# Patient Record
Sex: MALE | Race: WHITE | HISPANIC OR LATINO | URBAN - METROPOLITAN AREA
[De-identification: names, ages, dates, MRNs, and addresses within clinical notes are randomized per-mention and may not be internally consistent; named-entity substitution may affect disease eponyms.]

---

## 2017-02-21 ENCOUNTER — EMERGENCY (EMERGENCY)
Facility: HOSPITAL | Age: 37
LOS: 1 days | End: 2017-02-21
Payer: MEDICAID

## 2017-02-21 DIAGNOSIS — Z98.89 OTHER SPECIFIED POSTPROCEDURAL STATES: Chronic | ICD-10-CM

## 2017-02-21 DIAGNOSIS — I77.0 ARTERIOVENOUS FISTULA, ACQUIRED: Chronic | ICD-10-CM

## 2017-02-21 PROCEDURE — 99283 EMERGENCY DEPT VISIT LOW MDM: CPT

## 2017-03-20 ENCOUNTER — APPOINTMENT (OUTPATIENT)
Dept: CARDIOLOGY | Facility: CLINIC | Age: 37
End: 2017-03-20

## 2018-07-20 ENCOUNTER — EMERGENCY (EMERGENCY)
Facility: HOSPITAL | Age: 38
LOS: 1 days | End: 2018-07-20
Payer: MEDICAID

## 2018-07-20 DIAGNOSIS — Z98.89 OTHER SPECIFIED POSTPROCEDURAL STATES: Chronic | ICD-10-CM

## 2018-07-20 DIAGNOSIS — I77.0 ARTERIOVENOUS FISTULA, ACQUIRED: Chronic | ICD-10-CM

## 2018-07-20 PROCEDURE — 99284 EMERGENCY DEPT VISIT MOD MDM: CPT

## 2018-07-21 PROCEDURE — 70450 CT HEAD/BRAIN W/O DYE: CPT | Mod: 26

## 2018-08-28 ENCOUNTER — OUTPATIENT (OUTPATIENT)
Dept: OUTPATIENT SERVICES | Facility: HOSPITAL | Age: 38
LOS: 1 days | End: 2018-08-28

## 2018-08-28 DIAGNOSIS — I77.0 ARTERIOVENOUS FISTULA, ACQUIRED: Chronic | ICD-10-CM

## 2018-08-28 DIAGNOSIS — Z98.89 OTHER SPECIFIED POSTPROCEDURAL STATES: Chronic | ICD-10-CM

## 2018-09-04 ENCOUNTER — APPOINTMENT (OUTPATIENT)
Dept: TRANSPLANT | Facility: CLINIC | Age: 38
End: 2018-09-04

## 2018-09-04 ENCOUNTER — APPOINTMENT (OUTPATIENT)
Dept: NEPHROLOGY | Facility: CLINIC | Age: 38
End: 2018-09-04
Payer: COMMERCIAL

## 2018-09-04 VITALS
HEART RATE: 80 BPM | RESPIRATION RATE: 14 BRPM | SYSTOLIC BLOOD PRESSURE: 107 MMHG | HEIGHT: 64 IN | OXYGEN SATURATION: 96 % | TEMPERATURE: 98.3 F | DIASTOLIC BLOOD PRESSURE: 70 MMHG | WEIGHT: 128 LBS | BODY MASS INDEX: 21.85 KG/M2

## 2018-09-04 PROCEDURE — 99214 OFFICE O/P EST MOD 30 MIN: CPT

## 2018-09-12 LAB
ABO + RH PNL BLD: NORMAL
ALBUMIN SERPL ELPH-MCNC: 4.9 G/DL
ALP BLD-CCNC: 112 U/L
ALT SERPL-CCNC: 14 U/L
ANION GAP SERPL CALC-SCNC: 17 MMOL/L
AST SERPL-CCNC: 23 U/L
BASOPHILS # BLD AUTO: 0.05 K/UL
BASOPHILS NFR BLD AUTO: 0.9 %
BILIRUB SERPL-MCNC: 0.4 MG/DL
BUN SERPL-MCNC: 50 MG/DL
C PEPTIDE SERPL-MCNC: 4.5 NG/ML
CALCIUM SERPL-MCNC: 9.5 MG/DL
CHLORIDE SERPL-SCNC: 97 MMOL/L
CMV IGG SERPL QL: 1.8 U/ML
CMV IGG SERPL-IMP: POSITIVE
CO2 SERPL-SCNC: 28 MMOL/L
CREAT SERPL-MCNC: 10.02 MG/DL
EBV EA AB SER IA-ACNC: <5 U/ML
EBV EA AB TITR SER IF: POSITIVE
EBV EA IGG SER QL IA: >600 U/ML
EBV EA IGG SER-ACNC: NEGATIVE
EBV EA IGM SER IA-ACNC: NEGATIVE
EBV PATRN SPEC IB-IMP: NORMAL
EBV VCA IGG SER IA-ACNC: 67 U/ML
EBV VCA IGM SER QL IA: <10 U/ML
EOSINOPHIL # BLD AUTO: 0.52 K/UL
EOSINOPHIL NFR BLD AUTO: 9.2 %
EPSTEIN-BARR VIRUS CAPSID ANTIGEN IGG: POSITIVE
GLUCOSE SERPL-MCNC: 122 MG/DL
HBA1C MFR BLD HPLC: 6.5 %
HBV CORE IGG+IGM SER QL: NONREACTIVE
HBV SURFACE AB SER QL: NONREACTIVE
HBV SURFACE AG SER QL: NONREACTIVE
HCT VFR BLD CALC: 33.3 %
HCV AB SER QL: NONREACTIVE
HCV S/CO RATIO: 0.09 S/CO
HGB BLD-MCNC: 10.5 G/DL
HIV1+2 AB SPEC QL IA.RAPID: NONREACTIVE
HSV 1+2 IGG SER IA-IMP: NEGATIVE
HSV 1+2 IGG SER IA-IMP: POSITIVE
HSV1 IGG SER QL: 55.1 INDEX
HSV2 IGG SER QL: 0.13 INDEX
IMM GRANULOCYTES NFR BLD AUTO: 0.2 %
LYMPHOCYTES # BLD AUTO: 1.3 K/UL
LYMPHOCYTES NFR BLD AUTO: 23 %
MAGNESIUM SERPL-MCNC: 3.1 MG/DL
MAN DIFF?: NORMAL
MCHC RBC-ENTMCNC: 31.4 PG
MCHC RBC-ENTMCNC: 31.5 GM/DL
MCV RBC AUTO: 99.7 FL
MONOCYTES # BLD AUTO: 0.34 K/UL
MONOCYTES NFR BLD AUTO: 6 %
NEUTROPHILS # BLD AUTO: 3.42 K/UL
NEUTROPHILS NFR BLD AUTO: 60.7 %
PHOSPHATE SERPL-MCNC: 3.8 MG/DL
PLATELET # BLD AUTO: 206 K/UL
POTASSIUM SERPL-SCNC: 5.4 MMOL/L
PROT SERPL-MCNC: 8 G/DL
PSA SERPL-MCNC: 0.79 NG/ML
RBC # BLD: 3.34 M/UL
RBC # FLD: 13 %
RUBV IGG FLD-ACNC: >33 INDEX
RUBV IGG SER-IMP: POSITIVE
SODIUM SERPL-SCNC: 142 MMOL/L
T GONDII AB SER-IMP: POSITIVE
T GONDII IGG SER QL: 62.9 IU/ML
URATE SERPL-MCNC: 5.5 MG/DL
VZV AB TITR SER: POSITIVE
VZV IGG SER IF-ACNC: 2089 INDEX
WBC # FLD AUTO: 5.64 K/UL

## 2018-10-04 ENCOUNTER — APPOINTMENT (OUTPATIENT)
Dept: RADIOLOGY | Facility: IMAGING CENTER | Age: 38
End: 2018-10-04
Payer: COMMERCIAL

## 2018-10-04 ENCOUNTER — APPOINTMENT (OUTPATIENT)
Dept: CARDIOLOGY | Facility: CLINIC | Age: 38
End: 2018-10-04
Payer: COMMERCIAL

## 2018-10-04 ENCOUNTER — OUTPATIENT (OUTPATIENT)
Dept: OUTPATIENT SERVICES | Facility: HOSPITAL | Age: 38
LOS: 1 days | End: 2018-10-04
Payer: COMMERCIAL

## 2018-10-04 ENCOUNTER — NON-APPOINTMENT (OUTPATIENT)
Age: 38
End: 2018-10-04

## 2018-10-04 ENCOUNTER — APPOINTMENT (OUTPATIENT)
Dept: ULTRASOUND IMAGING | Facility: IMAGING CENTER | Age: 38
End: 2018-10-04
Payer: COMMERCIAL

## 2018-10-04 VITALS
OXYGEN SATURATION: 97 % | RESPIRATION RATE: 14 BRPM | HEIGHT: 64 IN | SYSTOLIC BLOOD PRESSURE: 123 MMHG | BODY MASS INDEX: 21.85 KG/M2 | DIASTOLIC BLOOD PRESSURE: 76 MMHG | WEIGHT: 128 LBS | HEART RATE: 86 BPM

## 2018-10-04 DIAGNOSIS — Z01.818 ENCOUNTER FOR OTHER PREPROCEDURAL EXAMINATION: ICD-10-CM

## 2018-10-04 DIAGNOSIS — I77.0 ARTERIOVENOUS FISTULA, ACQUIRED: Chronic | ICD-10-CM

## 2018-10-04 DIAGNOSIS — Z98.89 OTHER SPECIFIED POSTPROCEDURAL STATES: Chronic | ICD-10-CM

## 2018-10-04 PROCEDURE — 71045 X-RAY EXAM CHEST 1 VIEW: CPT | Mod: 26

## 2018-10-04 PROCEDURE — 93000 ELECTROCARDIOGRAM COMPLETE: CPT

## 2018-10-04 PROCEDURE — 76700 US EXAM ABDOM COMPLETE: CPT | Mod: 26

## 2018-10-04 PROCEDURE — 93979 VASCULAR STUDY: CPT

## 2018-10-04 PROCEDURE — 71045 X-RAY EXAM CHEST 1 VIEW: CPT

## 2018-10-04 PROCEDURE — 93979 VASCULAR STUDY: CPT | Mod: 26

## 2018-10-04 PROCEDURE — 99213 OFFICE O/P EST LOW 20 MIN: CPT

## 2018-10-04 PROCEDURE — 76700 US EXAM ABDOM COMPLETE: CPT

## 2018-10-18 ENCOUNTER — OUTPATIENT (OUTPATIENT)
Dept: OUTPATIENT SERVICES | Facility: HOSPITAL | Age: 38
LOS: 1 days | End: 2018-10-18

## 2018-10-18 ENCOUNTER — APPOINTMENT (OUTPATIENT)
Dept: CV DIAGNOSITCS | Facility: HOSPITAL | Age: 38
End: 2018-10-18
Payer: COMMERCIAL

## 2018-10-18 ENCOUNTER — APPOINTMENT (OUTPATIENT)
Dept: CV DIAGNOSTICS | Facility: HOSPITAL | Age: 38
End: 2018-10-18
Payer: COMMERCIAL

## 2018-10-18 DIAGNOSIS — N18.6 END STAGE RENAL DISEASE: ICD-10-CM

## 2018-10-18 DIAGNOSIS — I77.0 ARTERIOVENOUS FISTULA, ACQUIRED: Chronic | ICD-10-CM

## 2018-10-18 DIAGNOSIS — Z98.89 OTHER SPECIFIED POSTPROCEDURAL STATES: Chronic | ICD-10-CM

## 2018-10-18 DIAGNOSIS — E11.9 TYPE 2 DIABETES MELLITUS WITHOUT COMPLICATIONS: ICD-10-CM

## 2018-10-18 PROCEDURE — 93306 TTE W/DOPPLER COMPLETE: CPT | Mod: 26

## 2018-11-15 ENCOUNTER — OUTPATIENT (OUTPATIENT)
Dept: OUTPATIENT SERVICES | Facility: HOSPITAL | Age: 38
LOS: 1 days | End: 2018-11-15
Payer: COMMERCIAL

## 2018-11-15 ENCOUNTER — APPOINTMENT (OUTPATIENT)
Dept: CV DIAGNOSTICS | Facility: HOSPITAL | Age: 38
End: 2018-11-15

## 2018-11-15 DIAGNOSIS — E11.9 TYPE 2 DIABETES MELLITUS WITHOUT COMPLICATIONS: ICD-10-CM

## 2018-11-15 DIAGNOSIS — N18.6 END STAGE RENAL DISEASE: ICD-10-CM

## 2018-11-15 DIAGNOSIS — Z98.89 OTHER SPECIFIED POSTPROCEDURAL STATES: Chronic | ICD-10-CM

## 2018-11-15 DIAGNOSIS — I77.0 ARTERIOVENOUS FISTULA, ACQUIRED: Chronic | ICD-10-CM

## 2018-11-15 PROCEDURE — 93018 CV STRESS TEST I&R ONLY: CPT | Mod: GC

## 2018-11-15 PROCEDURE — 78452 HT MUSCLE IMAGE SPECT MULT: CPT | Mod: 26

## 2018-11-15 PROCEDURE — 93016 CV STRESS TEST SUPVJ ONLY: CPT | Mod: GC

## 2019-02-22 ENCOUNTER — EMERGENCY (EMERGENCY)
Facility: HOSPITAL | Age: 39
LOS: 1 days | End: 2019-02-22
Admitting: EMERGENCY MEDICINE
Payer: MEDICARE

## 2019-02-22 DIAGNOSIS — Z98.89 OTHER SPECIFIED POSTPROCEDURAL STATES: Chronic | ICD-10-CM

## 2019-02-22 DIAGNOSIS — I77.0 ARTERIOVENOUS FISTULA, ACQUIRED: Chronic | ICD-10-CM

## 2019-02-22 PROCEDURE — 71046 X-RAY EXAM CHEST 2 VIEWS: CPT | Mod: 26

## 2019-02-22 PROCEDURE — 99284 EMERGENCY DEPT VISIT MOD MDM: CPT

## 2019-02-22 PROCEDURE — 93010 ELECTROCARDIOGRAM REPORT: CPT

## 2019-08-12 ENCOUNTER — OUTPATIENT (OUTPATIENT)
Dept: OUTPATIENT SERVICES | Facility: HOSPITAL | Age: 39
LOS: 1 days | End: 2019-08-12

## 2019-08-12 DIAGNOSIS — Z98.89 OTHER SPECIFIED POSTPROCEDURAL STATES: Chronic | ICD-10-CM

## 2019-08-12 DIAGNOSIS — I77.0 ARTERIOVENOUS FISTULA, ACQUIRED: Chronic | ICD-10-CM

## 2019-09-10 ENCOUNTER — APPOINTMENT (OUTPATIENT)
Dept: TRANSPLANT | Facility: CLINIC | Age: 39
End: 2019-09-10

## 2019-09-10 ENCOUNTER — APPOINTMENT (OUTPATIENT)
Dept: NEPHROLOGY | Facility: CLINIC | Age: 39
End: 2019-09-10
Payer: COMMERCIAL

## 2019-09-10 VITALS
TEMPERATURE: 97.8 F | HEART RATE: 82 BPM | RESPIRATION RATE: 14 BRPM | SYSTOLIC BLOOD PRESSURE: 127 MMHG | OXYGEN SATURATION: 97 % | HEIGHT: 64 IN | WEIGHT: 128 LBS | DIASTOLIC BLOOD PRESSURE: 83 MMHG | BODY MASS INDEX: 21.85 KG/M2

## 2019-09-10 PROCEDURE — 99214 OFFICE O/P EST MOD 30 MIN: CPT

## 2019-09-10 RX ORDER — AMLODIPINE BESYLATE 5 MG/1
5 TABLET ORAL DAILY
Refills: 5 | Status: DISCONTINUED | COMMUNITY
End: 2019-09-10

## 2019-09-10 NOTE — PHYSICAL EXAM
[General Appearance - Alert] : alert [General Appearance - In No Acute Distress] : in no acute distress [Sclera] : the sclera and conjunctiva were normal [Jugular Venous Distention Increased] : there was no jugular-venous distention [Outer Ear] : the ears and nose were normal in appearance [Extraocular Movements] : extraocular movements were intact [Auscultation Breath Sounds / Voice Sounds] : lungs were clear to auscultation bilaterally [Heart Sounds Pericardial Friction Rub] : no pericardial rub [Arterial Pulses Carotid] : carotid pulses were normal with no bruits [Heart Sounds Gallop] : no gallops [Full Pulse] : the pedal pulses are present [Abdomen Tenderness] : non-tender [Abdomen Soft] : soft [Cervical Lymph Nodes Enlarged Anterior Bilaterally] : anterior cervical [Cervical Lymph Nodes Enlarged Posterior Bilaterally] : posterior cervical [Supraclavicular Lymph Nodes Enlarged Bilaterally] : supraclavicular [Axillary Lymph Nodes Enlarged Bilaterally] : axillary [Abnormal Walk] : normal gait [___ (cm) Fistula] : [unfilled] (cm) fistula [No Focal Deficits] : no focal deficits [] : no rash [Oriented To Time, Place, And Person] : oriented to person, place, and time [Impaired Insight] : insight and judgment were intact [Affect] : the affect was normal [Heart Rate And Rhythm] : heart rate was normal and rhythm regular [Heart Sounds] : normal S1 and S2 [Murmurs] : no murmurs [Edema] : there was no peripheral edema

## 2019-09-10 NOTE — HISTORY OF PRESENT ILLNESS
[FreeTextEntry1] : Mr. Mcghee is a 39 year old male with ESRD on dialysis since 2013, blood type O, who presents for f/u while on the kidney transplant waitlist.  He has a h/o hypertension (2013), DM (2000), now on insulin (2011). Has h/o diabetic retinopathy - vision is poor but does his own meds..  He is being followed by , previously by Dr. Hathaway.  He is on novolog 3 units with meals and 8 units of levemir.  \par Also has HTN and hyperlipidemia.  \par One hospitalization last year for influenza.\par No blood transfusions.  \par \par minimal urine output\par \par Past surgeries include: \par Left forearm AVF\par H/o cataract surgery. Had laser treatment for eyes.\par Non smoker, No ETOH use.\par \par He is independent for ADL. Can walk 10 blocks. Not working. Previously . \par \par

## 2019-09-10 NOTE — REASON FOR VISIT
[Follow-Up] : a follow-up visit [Family Member] : family member [FreeTextEntry1] : pre kidney transplant evaluation- follow up

## 2019-09-10 NOTE — ASSESSMENT
[FreeTextEntry1] : 1.  ESRD - Mr. Mcghee is an excellent candidate for kidney transplantation.  He could not identify any living donors but is interested in PHS inc risk and HCV positive kidney transplants. \par 2.  CV - no history of cardiac disease.  Will f/u with Dr. English. Normal stress and echo in 2018\par 3.  DM1 - discussed that blood sugar will rise after transplantation.\par 4.  HTN - stable. \par \par I have personally discussed the risks and benefits of transplantation where the following was disclosed:\par  \par Reviewed factors affecting survival and morbidity while on dialysis, the transplant wait list and reviewed james-operative and long-term risk factors affecting outcome in kidney transplantation.  \par  \par One year SRTR outcomes for national and [facility] were discussed in regards to patient survival and graft survival after transplantation.  \par  \par Details of transplant surgery, including complications were discussed.\par Immunosuppression and complications including infection including life threatening sepsis and opportunistic infections, malignancy and new onset diabetes were discussed.  \par  \par Benefits of live donor transplantation as well as variability in wait times across regions and multiple listing were discussed. \par KDPI >85% and PHS high risk criteria donors were discussed. \par HCV kidney transplantation was discussed.\par  \par

## 2019-09-10 NOTE — END OF VISIT
[>50% of Time Spent on Counseling for ____] : Greater than 50% of the encounter time was spent on counseling for [unfilled] [Time Spent: ___ minutes] : I have spent [unfilled] minutes of face to face time with the patient [FreeTextEntry1] : STACIE Del Rio

## 2019-09-11 LAB
ABO + RH PNL BLD: NORMAL
ALBUMIN SERPL ELPH-MCNC: 4.9 G/DL
ALP BLD-CCNC: 195 U/L
ALT SERPL-CCNC: 12 U/L
ANION GAP SERPL CALC-SCNC: 21 MMOL/L
AST SERPL-CCNC: 14 U/L
BASOPHILS # BLD AUTO: 0.06 K/UL
BASOPHILS NFR BLD AUTO: 1 %
BILIRUB SERPL-MCNC: 0.3 MG/DL
BUN SERPL-MCNC: 56 MG/DL
C PEPTIDE SERPL-MCNC: 7 NG/ML
CALCIUM SERPL-MCNC: 8.3 MG/DL
CHLORIDE SERPL-SCNC: 94 MMOL/L
CHOLEST SERPL-MCNC: 167 MG/DL
CHOLEST/HDLC SERPL: 1.9 RATIO
CK SERPL-CCNC: 294 U/L
CO2 SERPL-SCNC: 26 MMOL/L
CREAT SERPL-MCNC: 11.56 MG/DL
CRP SERPL-MCNC: <0.1 MG/DL
EOSINOPHIL # BLD AUTO: 0.33 K/UL
EOSINOPHIL NFR BLD AUTO: 5.3 %
ERYTHROCYTE [SEDIMENTATION RATE] IN BLOOD BY WESTERGREN METHOD: 10 MM/HR
ESTIMATED AVERAGE GLUCOSE: 143 MG/DL
GLUCOSE SERPL-MCNC: 112 MG/DL
HAV IGM SER QL: NONREACTIVE
HBA1C MFR BLD HPLC: 6.6 %
HBV CORE IGG+IGM SER QL: NONREACTIVE
HBV SURFACE AB SER QL: REACTIVE
HBV SURFACE AB SERPL IA-ACNC: 229.3 MIU/ML
HBV SURFACE AG SER QL: NONREACTIVE
HCT VFR BLD CALC: 42 %
HCV AB SER QL: NONREACTIVE
HCV S/CO RATIO: 0.1 S/CO
HDLC SERPL-MCNC: 88 MG/DL
HEPATITIS A IGG ANTIBODY: REACTIVE
HGB BLD-MCNC: 13.3 G/DL
IMM GRANULOCYTES NFR BLD AUTO: 0.3 %
LDLC SERPL CALC-MCNC: 63 MG/DL
LYMPHOCYTES # BLD AUTO: 1.42 K/UL
LYMPHOCYTES NFR BLD AUTO: 22.6 %
MAGNESIUM SERPL-MCNC: 2.9 MG/DL
MAN DIFF?: NORMAL
MCHC RBC-ENTMCNC: 30.9 PG
MCHC RBC-ENTMCNC: 31.7 GM/DL
MCV RBC AUTO: 97.7 FL
MONOCYTES # BLD AUTO: 0.51 K/UL
MONOCYTES NFR BLD AUTO: 8.1 %
NEUTROPHILS # BLD AUTO: 3.93 K/UL
NEUTROPHILS NFR BLD AUTO: 62.7 %
PHOSPHATE SERPL-MCNC: 6.1 MG/DL
PLATELET # BLD AUTO: 217 K/UL
POTASSIUM SERPL-SCNC: 5.7 MMOL/L
PROT SERPL-MCNC: 7.4 G/DL
PSA SERPL-MCNC: 0.83 NG/ML
RBC # BLD: 4.3 M/UL
RBC # FLD: 13.2 %
SODIUM SERPL-SCNC: 141 MMOL/L
T PALLIDUM AB SER QL IA: NEGATIVE
T3 SERPL-MCNC: 91 NG/DL
T4 FREE SERPL-MCNC: 1.2 NG/DL
TRIGL SERPL-MCNC: 79 MG/DL
TSH SERPL-ACNC: 1.03 UIU/ML
URATE SERPL-MCNC: 5.5 MG/DL
WBC # FLD AUTO: 6.27 K/UL

## 2019-09-12 LAB
CMV DNA SPEC QL NAA+PROBE: NOT DETECTED
CMV IGG SERPL QL: 1.6 U/ML
CMV IGG SERPL-IMP: POSITIVE
CMV IGM SERPL QL: <8 AU/ML
CMV IGM SERPL QL: NEGATIVE
CMVPCR LOG: NOT DETECTED LOGIU/ML
EBV EA AB SER IA-ACNC: <5 U/ML
EBV EA AB TITR SER IF: POSITIVE
EBV EA IGG SER QL IA: >600 U/ML
EBV EA IGG SER-ACNC: NEGATIVE
EBV EA IGM SER IA-ACNC: NEGATIVE
EBV PATRN SPEC IB-IMP: NORMAL
EBV VCA IGG SER IA-ACNC: 67.4 U/ML
EBV VCA IGM SER QL IA: <10 U/ML
EPSTEIN-BARR VIRUS CAPSID ANTIGEN IGG: POSITIVE
HSV 1+2 IGG SER IA-IMP: NEGATIVE
HSV 1+2 IGG SER IA-IMP: POSITIVE
HSV 1+2 IGG SER IA-IMP: POSITIVE
HSV1 IGG SER QL: >62.2 INDEX
HSV1 IGG SER QL: >62.2 INDEX
HSV1 IGM SER QL: NORMAL TITER
HSV2 AB FLD-ACNC: NORMAL TITER
HSV2 IGG SER QL: 0.17 INDEX
RUBV IGG FLD-ACNC: >33 INDEX
RUBV IGG SER-IMP: POSITIVE
T GONDII AB SER-IMP: POSITIVE
T GONDII IGG SER QL: 57 IU/ML
VZV AB TITR SER: POSITIVE
VZV IGG SER IF-ACNC: 1652 INDEX

## 2019-10-01 ENCOUNTER — OUTPATIENT (OUTPATIENT)
Dept: OUTPATIENT SERVICES | Facility: HOSPITAL | Age: 39
LOS: 1 days | End: 2019-10-01
Payer: MEDICARE

## 2019-10-01 ENCOUNTER — APPOINTMENT (OUTPATIENT)
Dept: CT IMAGING | Facility: CLINIC | Age: 39
End: 2019-10-01
Payer: COMMERCIAL

## 2019-10-01 ENCOUNTER — APPOINTMENT (OUTPATIENT)
Dept: RADIOLOGY | Facility: CLINIC | Age: 39
End: 2019-10-01
Payer: COMMERCIAL

## 2019-10-01 ENCOUNTER — OUTPATIENT (OUTPATIENT)
Dept: OUTPATIENT SERVICES | Facility: HOSPITAL | Age: 39
LOS: 1 days | End: 2019-10-01
Payer: COMMERCIAL

## 2019-10-01 DIAGNOSIS — Z98.89 OTHER SPECIFIED POSTPROCEDURAL STATES: Chronic | ICD-10-CM

## 2019-10-01 DIAGNOSIS — I77.0 ARTERIOVENOUS FISTULA, ACQUIRED: Chronic | ICD-10-CM

## 2019-10-01 DIAGNOSIS — Z76.82 AWAITING ORGAN TRANSPLANT STATUS: ICD-10-CM

## 2019-10-01 PROCEDURE — 74174 CTA ABD&PLVS W/CONTRAST: CPT | Mod: 26

## 2019-10-01 PROCEDURE — 71046 X-RAY EXAM CHEST 2 VIEWS: CPT | Mod: 26

## 2019-10-01 PROCEDURE — 71046 X-RAY EXAM CHEST 2 VIEWS: CPT

## 2019-10-01 PROCEDURE — 74174 CTA ABD&PLVS W/CONTRAST: CPT

## 2019-10-21 ENCOUNTER — TRANSCRIPTION ENCOUNTER (OUTPATIENT)
Age: 39
End: 2019-10-21

## 2019-10-22 ENCOUNTER — INPATIENT (INPATIENT)
Facility: HOSPITAL | Age: 39
LOS: 5 days | Discharge: HOME | DRG: 652 | End: 2019-10-28
Attending: TRANSPLANT SURGERY | Admitting: TRANSPLANT SURGERY
Payer: MEDICARE

## 2019-10-22 ENCOUNTER — APPOINTMENT (OUTPATIENT)
Dept: TRANSPLANT | Facility: HOSPITAL | Age: 39
End: 2019-10-22

## 2019-10-22 VITALS
SYSTOLIC BLOOD PRESSURE: 167 MMHG | RESPIRATION RATE: 18 BRPM | TEMPERATURE: 98 F | WEIGHT: 126.1 LBS | DIASTOLIC BLOOD PRESSURE: 98 MMHG | HEART RATE: 86 BPM | HEIGHT: 63 IN | OXYGEN SATURATION: 98 %

## 2019-10-22 DIAGNOSIS — I10 ESSENTIAL (PRIMARY) HYPERTENSION: ICD-10-CM

## 2019-10-22 DIAGNOSIS — I77.0 ARTERIOVENOUS FISTULA, ACQUIRED: Chronic | ICD-10-CM

## 2019-10-22 DIAGNOSIS — Z98.89 OTHER SPECIFIED POSTPROCEDURAL STATES: Chronic | ICD-10-CM

## 2019-10-22 DIAGNOSIS — T86.13 KIDNEY TRANSPLANT INFECTION: ICD-10-CM

## 2019-10-22 DIAGNOSIS — Z94.0 KIDNEY TRANSPLANT STATUS: ICD-10-CM

## 2019-10-22 DIAGNOSIS — D89.9 DISORDER INVOLVING THE IMMUNE MECHANISM, UNSPECIFIED: ICD-10-CM

## 2019-10-22 DIAGNOSIS — E11.9 TYPE 2 DIABETES MELLITUS WITHOUT COMPLICATIONS: ICD-10-CM

## 2019-10-22 LAB
ALBUMIN SERPL ELPH-MCNC: 3.4 G/DL — SIGNIFICANT CHANGE UP (ref 3.3–5)
ALBUMIN SERPL ELPH-MCNC: 3.6 G/DL — SIGNIFICANT CHANGE UP (ref 3.3–5)
ALBUMIN SERPL ELPH-MCNC: 4.8 G/DL — SIGNIFICANT CHANGE UP (ref 3.3–5)
ALP SERPL-CCNC: 129 U/L — HIGH (ref 40–120)
ALP SERPL-CCNC: 136 U/L — HIGH (ref 40–120)
ALP SERPL-CCNC: 208 U/L — HIGH (ref 40–120)
ALT FLD-CCNC: 10 U/L — SIGNIFICANT CHANGE UP (ref 10–45)
ALT FLD-CCNC: 8 U/L — LOW (ref 10–45)
ALT FLD-CCNC: 9 U/L — LOW (ref 10–45)
ANION GAP SERPL CALC-SCNC: 11 MMOL/L — SIGNIFICANT CHANGE UP (ref 5–17)
ANION GAP SERPL CALC-SCNC: 12 MMOL/L — SIGNIFICANT CHANGE UP (ref 5–17)
ANION GAP SERPL CALC-SCNC: 17 MMOL/L — SIGNIFICANT CHANGE UP (ref 5–17)
APTT BLD: 30.6 SEC — SIGNIFICANT CHANGE UP (ref 27.5–36.3)
AST SERPL-CCNC: 15 U/L — SIGNIFICANT CHANGE UP (ref 10–40)
AST SERPL-CCNC: 19 U/L — SIGNIFICANT CHANGE UP (ref 10–40)
AST SERPL-CCNC: 27 U/L — SIGNIFICANT CHANGE UP (ref 10–40)
BASE EXCESS BLDA CALC-SCNC: -4.7 MMOL/L — LOW (ref -2–2)
BASE EXCESS BLDV CALC-SCNC: 5 MMOL/L — HIGH (ref -2–2)
BASOPHILS # BLD AUTO: 0.07 K/UL — SIGNIFICANT CHANGE UP (ref 0–0.2)
BASOPHILS NFR BLD AUTO: 0.9 % — SIGNIFICANT CHANGE UP (ref 0–2)
BILIRUB SERPL-MCNC: 0.2 MG/DL — SIGNIFICANT CHANGE UP (ref 0.2–1.2)
BILIRUB SERPL-MCNC: 0.3 MG/DL — SIGNIFICANT CHANGE UP (ref 0.2–1.2)
BILIRUB SERPL-MCNC: 0.4 MG/DL — SIGNIFICANT CHANGE UP (ref 0.2–1.2)
BLD GP AB SCN SERPL QL: NEGATIVE — SIGNIFICANT CHANGE UP
BUN SERPL-MCNC: 43 MG/DL — HIGH (ref 7–23)
BUN SERPL-MCNC: 46 MG/DL — HIGH (ref 7–23)
BUN SERPL-MCNC: 47 MG/DL — HIGH (ref 7–23)
CA-I SERPL-SCNC: 1.03 MMOL/L — LOW (ref 1.12–1.3)
CALCIUM SERPL-MCNC: 7.2 MG/DL — LOW (ref 8.4–10.5)
CALCIUM SERPL-MCNC: 7.8 MG/DL — LOW (ref 8.4–10.5)
CALCIUM SERPL-MCNC: 8.5 MG/DL — SIGNIFICANT CHANGE UP (ref 8.4–10.5)
CHLORIDE BLDV-SCNC: 98 MMOL/L — SIGNIFICANT CHANGE UP (ref 96–108)
CHLORIDE SERPL-SCNC: 107 MMOL/L — SIGNIFICANT CHANGE UP (ref 96–108)
CHLORIDE SERPL-SCNC: 107 MMOL/L — SIGNIFICANT CHANGE UP (ref 96–108)
CHLORIDE SERPL-SCNC: 92 MMOL/L — LOW (ref 96–108)
CO2 BLDA-SCNC: 22 MMOL/L — SIGNIFICANT CHANGE UP (ref 22–30)
CO2 BLDV-SCNC: 33 MMOL/L — HIGH (ref 22–30)
CO2 SERPL-SCNC: 18 MMOL/L — LOW (ref 22–31)
CO2 SERPL-SCNC: 20 MMOL/L — LOW (ref 22–31)
CO2 SERPL-SCNC: 29 MMOL/L — SIGNIFICANT CHANGE UP (ref 22–31)
CREAT SERPL-MCNC: 8.94 MG/DL — HIGH (ref 0.5–1.3)
CREAT SERPL-MCNC: 9.46 MG/DL — HIGH (ref 0.5–1.3)
CREAT SERPL-MCNC: 9.78 MG/DL — HIGH (ref 0.5–1.3)
EOSINOPHIL # BLD AUTO: 0.43 K/UL — SIGNIFICANT CHANGE UP (ref 0–0.5)
EOSINOPHIL NFR BLD AUTO: 5.4 % — SIGNIFICANT CHANGE UP (ref 0–6)
GAS PNL BLDA: SIGNIFICANT CHANGE UP
GAS PNL BLDV: 137 MMOL/L — SIGNIFICANT CHANGE UP (ref 135–145)
GAS PNL BLDV: SIGNIFICANT CHANGE UP
GAS PNL BLDV: SIGNIFICANT CHANGE UP
GLUCOSE BLDC GLUCOMTR-MCNC: 135 MG/DL — HIGH (ref 70–99)
GLUCOSE BLDC GLUCOMTR-MCNC: 143 MG/DL — HIGH (ref 70–99)
GLUCOSE BLDC GLUCOMTR-MCNC: 151 MG/DL — HIGH (ref 70–99)
GLUCOSE BLDC GLUCOMTR-MCNC: 154 MG/DL — HIGH (ref 70–99)
GLUCOSE BLDV-MCNC: 167 MG/DL — HIGH (ref 70–99)
GLUCOSE SERPL-MCNC: 138 MG/DL — HIGH (ref 70–99)
GLUCOSE SERPL-MCNC: 157 MG/DL — HIGH (ref 70–99)
GLUCOSE SERPL-MCNC: 168 MG/DL — HIGH (ref 70–99)
HAV IGM SER-ACNC: SIGNIFICANT CHANGE UP
HBV CORE IGM SER-ACNC: SIGNIFICANT CHANGE UP
HBV SURFACE AG SER-ACNC: SIGNIFICANT CHANGE UP
HCO3 BLDA-SCNC: 21 MMOL/L — SIGNIFICANT CHANGE UP (ref 21–29)
HCO3 BLDV-SCNC: 31 MMOL/L — HIGH (ref 21–29)
HCT VFR BLD CALC: 31.6 % — LOW (ref 39–50)
HCT VFR BLD CALC: 31.6 % — LOW (ref 39–50)
HCT VFR BLD CALC: 39.7 % — SIGNIFICANT CHANGE UP (ref 39–50)
HCT VFR BLDA CALC: 42 % — SIGNIFICANT CHANGE UP (ref 39–50)
HCV AB S/CO SERPL IA: 0.1 S/CO — SIGNIFICANT CHANGE UP (ref 0–0.99)
HCV AB SERPL-IMP: SIGNIFICANT CHANGE UP
HGB BLD CALC-MCNC: 13.6 G/DL — SIGNIFICANT CHANGE UP (ref 13–17)
HGB BLD-MCNC: 13.1 G/DL — SIGNIFICANT CHANGE UP (ref 13–17)
HGB BLD-MCNC: 9.9 G/DL — LOW (ref 13–17)
HGB BLD-MCNC: 9.9 G/DL — LOW (ref 13–17)
HOROWITZ INDEX BLDA+IHG-RTO: 21 — SIGNIFICANT CHANGE UP
HOROWITZ INDEX BLDV+IHG-RTO: 21 — SIGNIFICANT CHANGE UP
IMM GRANULOCYTES NFR BLD AUTO: 0.1 % — SIGNIFICANT CHANGE UP (ref 0–1.5)
INR BLD: 0.85 RATIO — LOW (ref 0.88–1.16)
LACTATE BLDV-MCNC: 1.2 MMOL/L — SIGNIFICANT CHANGE UP (ref 0.7–2)
LYMPHOCYTES # BLD AUTO: 1.51 K/UL — SIGNIFICANT CHANGE UP (ref 1–3.3)
LYMPHOCYTES # BLD AUTO: 19.1 % — SIGNIFICANT CHANGE UP (ref 13–44)
MAGNESIUM SERPL-MCNC: 2.2 MG/DL — SIGNIFICANT CHANGE UP (ref 1.6–2.6)
MAGNESIUM SERPL-MCNC: 2.9 MG/DL — HIGH (ref 1.6–2.6)
MCHC RBC-ENTMCNC: 29.7 PG — SIGNIFICANT CHANGE UP (ref 27–34)
MCHC RBC-ENTMCNC: 30 PG — SIGNIFICANT CHANGE UP (ref 27–34)
MCHC RBC-ENTMCNC: 30.4 PG — SIGNIFICANT CHANGE UP (ref 27–34)
MCHC RBC-ENTMCNC: 31.3 GM/DL — LOW (ref 32–36)
MCHC RBC-ENTMCNC: 31.3 GM/DL — LOW (ref 32–36)
MCHC RBC-ENTMCNC: 33 GM/DL — SIGNIFICANT CHANGE UP (ref 32–36)
MCV RBC AUTO: 92.1 FL — SIGNIFICANT CHANGE UP (ref 80–100)
MCV RBC AUTO: 94.9 FL — SIGNIFICANT CHANGE UP (ref 80–100)
MCV RBC AUTO: 95.8 FL — SIGNIFICANT CHANGE UP (ref 80–100)
MONOCYTES # BLD AUTO: 0.73 K/UL — SIGNIFICANT CHANGE UP (ref 0–0.9)
MONOCYTES NFR BLD AUTO: 9.2 % — SIGNIFICANT CHANGE UP (ref 2–14)
NEUTROPHILS # BLD AUTO: 5.16 K/UL — SIGNIFICANT CHANGE UP (ref 1.8–7.4)
NEUTROPHILS NFR BLD AUTO: 65.3 % — SIGNIFICANT CHANGE UP (ref 43–77)
NRBC # BLD: 0 /100 WBCS — SIGNIFICANT CHANGE UP (ref 0–0)
OTHER CELLS CSF MANUAL: 14 ML/DL — LOW (ref 18–22)
PCO2 BLDA: 42 MMHG — SIGNIFICANT CHANGE UP (ref 32–46)
PCO2 BLDV: 54 MMHG — HIGH (ref 35–50)
PH BLDA: 7.31 — LOW (ref 7.35–7.45)
PH BLDV: 7.38 — SIGNIFICANT CHANGE UP (ref 7.35–7.45)
PHOSPHATE SERPL-MCNC: 2.5 MG/DL — SIGNIFICANT CHANGE UP (ref 2.5–4.5)
PHOSPHATE SERPL-MCNC: 3.2 MG/DL — SIGNIFICANT CHANGE UP (ref 2.5–4.5)
PLATELET # BLD AUTO: 173 K/UL — SIGNIFICANT CHANGE UP (ref 150–400)
PLATELET # BLD AUTO: 178 K/UL — SIGNIFICANT CHANGE UP (ref 150–400)
PLATELET # BLD AUTO: 207 K/UL — SIGNIFICANT CHANGE UP (ref 150–400)
PO2 BLDA: 118 MMHG — HIGH (ref 74–108)
PO2 BLDV: 42 MMHG — SIGNIFICANT CHANGE UP (ref 25–45)
POTASSIUM BLDV-SCNC: 4.8 MMOL/L — SIGNIFICANT CHANGE UP (ref 3.5–5.3)
POTASSIUM SERPL-MCNC: 4.9 MMOL/L — SIGNIFICANT CHANGE UP (ref 3.5–5.3)
POTASSIUM SERPL-MCNC: 5.3 MMOL/L — SIGNIFICANT CHANGE UP (ref 3.5–5.3)
POTASSIUM SERPL-MCNC: 7.3 MMOL/L — CRITICAL HIGH (ref 3.5–5.3)
POTASSIUM SERPL-SCNC: 4.9 MMOL/L — SIGNIFICANT CHANGE UP (ref 3.5–5.3)
POTASSIUM SERPL-SCNC: 5.3 MMOL/L — SIGNIFICANT CHANGE UP (ref 3.5–5.3)
POTASSIUM SERPL-SCNC: 7.3 MMOL/L — CRITICAL HIGH (ref 3.5–5.3)
PROT SERPL-MCNC: 5.6 G/DL — LOW (ref 6–8.3)
PROT SERPL-MCNC: 5.8 G/DL — LOW (ref 6–8.3)
PROT SERPL-MCNC: 7.9 G/DL — SIGNIFICANT CHANGE UP (ref 6–8.3)
PROTHROM AB SERPL-ACNC: 9.6 SEC — LOW (ref 10–12.9)
RBC # BLD: 3.3 M/UL — LOW (ref 4.2–5.8)
RBC # BLD: 3.33 M/UL — LOW (ref 4.2–5.8)
RBC # BLD: 4.31 M/UL — SIGNIFICANT CHANGE UP (ref 4.2–5.8)
RBC # FLD: 12.9 % — SIGNIFICANT CHANGE UP (ref 10.3–14.5)
RBC # FLD: 13.2 % — SIGNIFICANT CHANGE UP (ref 10.3–14.5)
RBC # FLD: 13.2 % — SIGNIFICANT CHANGE UP (ref 10.3–14.5)
RH IG SCN BLD-IMP: POSITIVE — SIGNIFICANT CHANGE UP
SAO2 % BLDA: 97 % — HIGH (ref 92–96)
SAO2 % BLDV: 75 % — SIGNIFICANT CHANGE UP (ref 67–88)
SODIUM SERPL-SCNC: 137 MMOL/L — SIGNIFICANT CHANGE UP (ref 135–145)
SODIUM SERPL-SCNC: 138 MMOL/L — SIGNIFICANT CHANGE UP (ref 135–145)
SODIUM SERPL-SCNC: 138 MMOL/L — SIGNIFICANT CHANGE UP (ref 135–145)
WBC # BLD: 14.1 K/UL — HIGH (ref 3.8–10.5)
WBC # BLD: 16.41 K/UL — HIGH (ref 3.8–10.5)
WBC # BLD: 7.91 K/UL — SIGNIFICANT CHANGE UP (ref 3.8–10.5)
WBC # FLD AUTO: 14.1 K/UL — HIGH (ref 3.8–10.5)
WBC # FLD AUTO: 16.41 K/UL — HIGH (ref 3.8–10.5)
WBC # FLD AUTO: 7.91 K/UL — SIGNIFICANT CHANGE UP (ref 3.8–10.5)

## 2019-10-22 PROCEDURE — 76998 US GUIDE INTRAOP: CPT | Mod: 26,GC

## 2019-10-22 PROCEDURE — 71045 X-RAY EXAM CHEST 1 VIEW: CPT | Mod: 26,76

## 2019-10-22 PROCEDURE — 93010 ELECTROCARDIOGRAM REPORT: CPT

## 2019-10-22 PROCEDURE — 99222 1ST HOSP IP/OBS MODERATE 55: CPT | Mod: GC

## 2019-10-22 PROCEDURE — 50360 RNL ALTRNSPLJ W/O RCP NFRCT: CPT | Mod: GC

## 2019-10-22 PROCEDURE — 76776 US EXAM K TRANSPL W/DOPPLER: CPT | Mod: 26,RT

## 2019-10-22 PROCEDURE — 50605 INSERT URETERAL SUPPORT: CPT | Mod: GC

## 2019-10-22 PROCEDURE — 93010 ELECTROCARDIOGRAM REPORT: CPT | Mod: 77

## 2019-10-22 PROCEDURE — 50325 PREP DONOR RENAL GRAFT: CPT | Mod: GC

## 2019-10-22 RX ORDER — TACROLIMUS 5 MG/1
4 CAPSULE ORAL DAILY
Refills: 0 | Status: DISCONTINUED | OUTPATIENT
Start: 2019-10-23 | End: 2019-10-24

## 2019-10-22 RX ORDER — CEFAZOLIN SODIUM 1 G
2000 VIAL (EA) INJECTION ONCE
Refills: 0 | Status: DISCONTINUED | OUTPATIENT
Start: 2019-10-22 | End: 2019-10-22

## 2019-10-22 RX ORDER — HYDROMORPHONE HYDROCHLORIDE 2 MG/ML
0.5 INJECTION INTRAMUSCULAR; INTRAVENOUS; SUBCUTANEOUS
Refills: 0 | Status: DISCONTINUED | OUTPATIENT
Start: 2019-10-22 | End: 2019-10-23

## 2019-10-22 RX ORDER — SODIUM CHLORIDE 9 MG/ML
1000 INJECTION INTRAMUSCULAR; INTRAVENOUS; SUBCUTANEOUS
Refills: 0 | Status: DISCONTINUED | OUTPATIENT
Start: 2019-10-22 | End: 2019-10-24

## 2019-10-22 RX ORDER — ASPIRIN/CALCIUM CARB/MAGNESIUM 324 MG
81 TABLET ORAL DAILY
Refills: 0 | Status: DISCONTINUED | OUTPATIENT
Start: 2019-10-23 | End: 2019-10-28

## 2019-10-22 RX ORDER — NALOXONE HYDROCHLORIDE 4 MG/.1ML
0.1 SPRAY NASAL
Refills: 0 | Status: DISCONTINUED | OUTPATIENT
Start: 2019-10-22 | End: 2019-10-28

## 2019-10-22 RX ORDER — HYDROMORPHONE HYDROCHLORIDE 2 MG/ML
0.5 INJECTION INTRAMUSCULAR; INTRAVENOUS; SUBCUTANEOUS
Refills: 0 | Status: DISCONTINUED | OUTPATIENT
Start: 2019-10-22 | End: 2019-10-22

## 2019-10-22 RX ORDER — BASILIXIMAB 20 MG/5ML
20 INJECTION, POWDER, FOR SOLUTION INTRAVENOUS ONCE
Refills: 0 | Status: DISCONTINUED | OUTPATIENT
Start: 2019-10-22 | End: 2019-10-22

## 2019-10-22 RX ORDER — VALGANCICLOVIR 450 MG/1
450 TABLET, FILM COATED ORAL DAILY
Refills: 0 | Status: DISCONTINUED | OUTPATIENT
Start: 2019-10-23 | End: 2019-10-28

## 2019-10-22 RX ORDER — SODIUM CHLORIDE 9 MG/ML
1000 INJECTION, SOLUTION INTRAVENOUS
Refills: 0 | Status: DISCONTINUED | OUTPATIENT
Start: 2019-10-22 | End: 2019-10-28

## 2019-10-22 RX ORDER — DEXTROSE 50 % IN WATER 50 %
50 SYRINGE (ML) INTRAVENOUS ONCE
Refills: 0 | Status: COMPLETED | OUTPATIENT
Start: 2019-10-22 | End: 2019-10-22

## 2019-10-22 RX ORDER — MYCOPHENOLATE MOFETIL 250 MG/1
1000 CAPSULE ORAL
Refills: 0 | Status: DISCONTINUED | OUTPATIENT
Start: 2019-10-23 | End: 2019-10-28

## 2019-10-22 RX ORDER — DEXTROSE 50 % IN WATER 50 %
25 SYRINGE (ML) INTRAVENOUS ONCE
Refills: 0 | Status: DISCONTINUED | OUTPATIENT
Start: 2019-10-22 | End: 2019-10-28

## 2019-10-22 RX ORDER — DEXTROSE 50 % IN WATER 50 %
25 SYRINGE (ML) INTRAVENOUS ONCE
Refills: 0 | Status: DISCONTINUED | OUTPATIENT
Start: 2019-10-22 | End: 2019-10-22

## 2019-10-22 RX ORDER — DEXTROSE 50 % IN WATER 50 %
15 SYRINGE (ML) INTRAVENOUS ONCE
Refills: 0 | Status: DISCONTINUED | OUTPATIENT
Start: 2019-10-22 | End: 2019-10-28

## 2019-10-22 RX ORDER — SODIUM CHLORIDE 9 MG/ML
1000 INJECTION, SOLUTION INTRAVENOUS
Refills: 0 | Status: DISCONTINUED | OUTPATIENT
Start: 2019-10-22 | End: 2019-10-22

## 2019-10-22 RX ORDER — MYCOPHENOLATE MOFETIL 250 MG/1
1000 CAPSULE ORAL ONCE
Refills: 0 | Status: COMPLETED | OUTPATIENT
Start: 2019-10-22 | End: 2019-10-22

## 2019-10-22 RX ORDER — ONDANSETRON 8 MG/1
4 TABLET, FILM COATED ORAL EVERY 6 HOURS
Refills: 0 | Status: DISCONTINUED | OUTPATIENT
Start: 2019-10-22 | End: 2019-10-28

## 2019-10-22 RX ORDER — CALCIUM GLUCONATE 100 MG/ML
2 VIAL (ML) INTRAVENOUS ONCE
Refills: 0 | Status: COMPLETED | OUTPATIENT
Start: 2019-10-22 | End: 2019-10-22

## 2019-10-22 RX ORDER — ONDANSETRON 8 MG/1
4 TABLET, FILM COATED ORAL ONCE
Refills: 0 | Status: COMPLETED | OUTPATIENT
Start: 2019-10-22 | End: 2019-10-22

## 2019-10-22 RX ORDER — SODIUM CHLORIDE 9 MG/ML
500 INJECTION, SOLUTION INTRAVENOUS
Refills: 0 | Status: DISCONTINUED | OUTPATIENT
Start: 2019-10-22 | End: 2019-10-24

## 2019-10-22 RX ORDER — ASPIRIN/CALCIUM CARB/MAGNESIUM 324 MG
81 TABLET ORAL ONCE
Refills: 0 | Status: COMPLETED | OUTPATIENT
Start: 2019-10-22 | End: 2019-10-22

## 2019-10-22 RX ORDER — GLUCAGON INJECTION, SOLUTION 0.5 MG/.1ML
1 INJECTION, SOLUTION SUBCUTANEOUS ONCE
Refills: 0 | Status: DISCONTINUED | OUTPATIENT
Start: 2019-10-22 | End: 2019-10-28

## 2019-10-22 RX ORDER — DEXTROSE 50 % IN WATER 50 %
15 SYRINGE (ML) INTRAVENOUS ONCE
Refills: 0 | Status: DISCONTINUED | OUTPATIENT
Start: 2019-10-22 | End: 2019-10-22

## 2019-10-22 RX ORDER — DEXTROSE 50 % IN WATER 50 %
12.5 SYRINGE (ML) INTRAVENOUS ONCE
Refills: 0 | Status: DISCONTINUED | OUTPATIENT
Start: 2019-10-22 | End: 2019-10-28

## 2019-10-22 RX ORDER — HYDROMORPHONE HYDROCHLORIDE 2 MG/ML
30 INJECTION INTRAMUSCULAR; INTRAVENOUS; SUBCUTANEOUS
Refills: 0 | Status: DISCONTINUED | OUTPATIENT
Start: 2019-10-22 | End: 2019-10-23

## 2019-10-22 RX ORDER — INSULIN HUMAN 100 [IU]/ML
10 INJECTION, SOLUTION SUBCUTANEOUS ONCE
Refills: 0 | Status: DISCONTINUED | OUTPATIENT
Start: 2019-10-22 | End: 2019-10-22

## 2019-10-22 RX ORDER — INSULIN LISPRO 100/ML
VIAL (ML) SUBCUTANEOUS EVERY 4 HOURS
Refills: 0 | Status: DISCONTINUED | OUTPATIENT
Start: 2019-10-22 | End: 2019-10-22

## 2019-10-22 RX ORDER — DEXTROSE 50 % IN WATER 50 %
12.5 SYRINGE (ML) INTRAVENOUS ONCE
Refills: 0 | Status: DISCONTINUED | OUTPATIENT
Start: 2019-10-22 | End: 2019-10-22

## 2019-10-22 RX ORDER — CHLORHEXIDINE GLUCONATE 213 G/1000ML
1 SOLUTION TOPICAL
Refills: 0 | Status: DISCONTINUED | OUTPATIENT
Start: 2019-10-22 | End: 2019-10-28

## 2019-10-22 RX ORDER — GLUCAGON INJECTION, SOLUTION 0.5 MG/.1ML
1 INJECTION, SOLUTION SUBCUTANEOUS ONCE
Refills: 0 | Status: DISCONTINUED | OUTPATIENT
Start: 2019-10-22 | End: 2019-10-22

## 2019-10-22 RX ORDER — INSULIN LISPRO 100/ML
VIAL (ML) SUBCUTANEOUS EVERY 6 HOURS
Refills: 0 | Status: DISCONTINUED | OUTPATIENT
Start: 2019-10-22 | End: 2019-10-23

## 2019-10-22 RX ORDER — FAMOTIDINE 10 MG/ML
20 INJECTION INTRAVENOUS DAILY
Refills: 0 | Status: DISCONTINUED | OUTPATIENT
Start: 2019-10-23 | End: 2019-10-28

## 2019-10-22 RX ORDER — NYSTATIN 500MM UNIT
500000 POWDER (EA) MISCELLANEOUS
Refills: 0 | Status: DISCONTINUED | OUTPATIENT
Start: 2019-10-23 | End: 2019-10-28

## 2019-10-22 RX ORDER — INSULIN HUMAN 100 [IU]/ML
10 INJECTION, SOLUTION SUBCUTANEOUS ONCE
Refills: 0 | Status: COMPLETED | OUTPATIENT
Start: 2019-10-22 | End: 2019-10-22

## 2019-10-22 RX ADMIN — HYDROMORPHONE HYDROCHLORIDE 30 MILLILITER(S): 2 INJECTION INTRAMUSCULAR; INTRAVENOUS; SUBCUTANEOUS at 18:42

## 2019-10-22 RX ADMIN — Medication 81 MILLIGRAM(S): at 15:48

## 2019-10-22 RX ADMIN — INSULIN HUMAN 10 UNIT(S): 100 INJECTION, SOLUTION SUBCUTANEOUS at 20:31

## 2019-10-22 RX ADMIN — ONDANSETRON 4 MILLIGRAM(S): 8 TABLET, FILM COATED ORAL at 22:14

## 2019-10-22 RX ADMIN — ONDANSETRON 4 MILLIGRAM(S): 8 TABLET, FILM COATED ORAL at 18:26

## 2019-10-22 RX ADMIN — Medication 50 MILLILITER(S): at 20:31

## 2019-10-22 RX ADMIN — Medication 200 GRAM(S): at 20:59

## 2019-10-22 RX ADMIN — SODIUM CHLORIDE 70 MILLILITER(S): 9 INJECTION INTRAMUSCULAR; INTRAVENOUS; SUBCUTANEOUS at 16:01

## 2019-10-22 RX ADMIN — HYDROMORPHONE HYDROCHLORIDE 30 MILLILITER(S): 2 INJECTION INTRAMUSCULAR; INTRAVENOUS; SUBCUTANEOUS at 21:39

## 2019-10-22 RX ADMIN — Medication 1: at 18:20

## 2019-10-22 RX ADMIN — MYCOPHENOLATE MOFETIL 1000 MILLIGRAM(S): 250 CAPSULE ORAL at 20:49

## 2019-10-22 NOTE — H&P ADULT - NSHPREVIEWOFSYSTEMS_GEN_ALL_CORE
Review of systems  Gen: No weight changes, fatigue, fevers/chills, weakness  Skin: No rashes  Head/Eyes/Ears/Mouth: Reports Impaired vision, No headache; Normal hearing;  No sinus pain/discomfort, sore throat  Respiratory: No dyspnea, cough, wheezing, hemoptysis  CV: No chest pain, PND, orthopnea  GI: No abdominal pain, diarrhea, constipation, nausea, vomiting, melena, hematochezia  : Reports minimal urine output  MSK: No joint pain/swelling; no back pain; no edema  Neuro: BL LE numbness and tingling at baseline, No dizziness/lightheadedness, weakness, seizures,  Heme: No easy bruising or bleeding  Endo: No heat/cold intolerance  Psych: No significant nervousness, anxiety, stress, depression  All other systems were reviewed and are negative, except as noted.

## 2019-10-22 NOTE — CONSULT NOTE ADULT - PROBLEM SELECTOR RECOMMENDATION 3
home medication norvasc 5mg daily and hydralazine 100 mg BID    - monitor BP - hole home med norvasc 5mg daily and hydralazine 100 BID, restart when BP can tolerate starting w/ norvasc  - monitor BP - hold home med norvasc 5mg daily and hydralazine 100 BID, restart when BP can tolerate starting w/ norvasc  - monitor BP

## 2019-10-22 NOTE — H&P ADULT - NSHPLABSRESULTS_GEN_ALL_CORE
Vital Signs Last 24 Hrs  T(C): 36.7 (22 Oct 2019 05:58), Max: 36.7 (22 Oct 2019 05:58)  T(F): 98.1 (22 Oct 2019 05:58), Max: 98.1 (22 Oct 2019 05:58)  HR: 86 (22 Oct 2019 05:58) (86 - 86)  BP: 167/98 (22 Oct 2019 05:58) (167/98 - 167/98)  BP(mean): --  RR: 18 (22 Oct 2019 05:58) (18 - 18)  SpO2: 98% (22 Oct 2019 05:58) (98% - 98%)    I&O's Summary                            13.1   7.91  )-----------( 207      ( 22 Oct 2019 05:47 )             39.7     10-22    138  |  92<L>  |  47<H>  ----------------------------<  168<H>  4.9   |  29  |  9.46<H>    Ca    8.5      22 Oct 2019 05:48  Phos  3.2     10-22  Mg     2.9     10-22    TPro  7.9  /  Alb  4.8  /  TBili  0.4  /  DBili  0.1  /  AST  19  /  ALT  10  /  AlkPhos  208<H>  10-22          -EKG: 10/4/2018 NSR     -Stress: 11/15/2018- NSR with no significant ST abnormalities     -ECHO: 10/18/2018- EF 66%  Mild MR. Normal LV internal dimensions and wall thicknesses Normal LV systolic function. No segmental wall motion abnormalities. Normal RV size and function.     -CATH: 6/21/16 Proximal LAD minur luminar irregularities with no flow limiting lesions, RCA large sized minor luminal irregularieties with no flow limiting lesions.     -CT Abdomen – 10/1/2019 - The abdominal aorta is nonaneurysmal with mild atherosclerotic changes. The celiac axis, superior mesenteric artery, inferior mesenteric artery and renal arteries are patent without stenosis. The iliac arteries show no evidence of stenosis the internal and external iliacs are patent without focal abnormality. The deep venous system including IVC and iliac veins appear to be within normal limits No evidence of vascular stenosis or venous thrombosis. 5 mm right lower lobe nodule new when compared to prior abdominal pelvic

## 2019-10-22 NOTE — CONSULT NOTE ADULT - PROBLEM SELECTOR RECOMMENDATION 9
s/p HCV+ DDRT, simulect induction POD# 0 and 4    - immunosuppression: Cellcept 1000 BID, Methyprednisolone taper (>125>60>30),   - will start envarsus 0.14 mg/kg/day starting tomorrow   - start ppx: bactrim/valcyte/nystatin/pepcid  - check daily Tacrolimus trough 30 minutes before morning dose (goal 8-10)  - trend SCr, strict I/O  - renally dose medications s/p HCV+ DDRT, simulect induction POD# 0 and 4    - will reassess need for dialysis including K and volume status (wife consented for dialysis)  - immunosuppression: Cellcept 1000 BID, Methyprednisolone taper (>125>60>30),   - will start envarsus 0.14 mg/kg/day starting tomorrow   - start ppx: bactrim/valcyte/nystatin/pepcid  - check daily Tacrolimus trough 30 minutes before morning dose (goal 8-10)  - trend SCr, strict I/O  - renally dose medications

## 2019-10-22 NOTE — PROGRESS NOTE ADULT - SUBJECTIVE AND OBJECTIVE BOX
Transplant Surgery - Multidisciplinary Rounds  --------------------------------------------------------------  DDRT     Date:  10/22/19       POD#0    Present:   Patient seen with Dr. Greenberg/[facility]    HPI:    39M with PMHx of IDDM () on insulin since , ESRD on HD M/W/F since  (last HD Monday 10/22) Via Left forearm AVF, HTN (), HLD, Retinopathy,  Cataract surgery and laser eye surgery s/p HCV+ DDRT with Simulect induction on 10/22. ureter stented.        Recipient:  Jerardo Mcghee   /Age:  1980 age 39   Hx: ESRD on HD since , HTN, T2DM, Retinopathy, Anemia,   Nephrologist: ,   CPRA: 0%   ABO: O+   CMV/EBV Status: CMV +, EBV +   Last HD: Monday 10/21/19   NPO   since 10/21/2019 @ 2130   Pt Location on route [facility]   Financially cleared - yes   Recipient OR   - 10/22/2019 at 0700   Surgeon: Dr. Michele Greenberg     Donor (import?): yes -   Donor ID: [id number]   Match:  [id number] then local match [id number]   OPO: [facility] [place] [place]   Age: 41   ABO:  O   KDPI: 44%   COD:  Anoxia/Seizure   X Clamp Time  - 10/21/2019 – 04:53   Medical Hx:  ex- IVDU on methadone, kidney stones, UTI, ureter stent, HTN during pregnancy, smoker, anti - HCV + , HCV ARCADIO + , HBsAb +   Terminal Creatinine – 0.5   CMV +/EBV +   Kidney ETA to [facility]: 10/22/19 at 0600   Kidney Laterality   - Left   X-match – virtual negative, no sensitizing events     OR:    donor renal transplant to right iliac vessels  Two arteries, anastomosed separately, single vein, Extramedullary renal pelvis with single ureter        Interval Events:  POD#0  Afebrile, VS stable  oliguric (5-10cc/h post-op). Cr downtrending 8.94 from 9.46 pre-op  Pain well controlled, AAOx3 but drowsy  No other complaints at this time     Potential Discharge date:    Education:  Medications    Plan of care:  See Below    MEDICATIONS  (STANDING):  chlorhexidine 4% Liquid 1 Application(s) Topical <User Schedule>  dextrose 5%. 1000 milliLiter(s) (50 mL/Hr) IV Continuous <Continuous>  dextrose 50% Injectable 12.5 Gram(s) IV Push once  dextrose 50% Injectable 25 Gram(s) IV Push once  dextrose 50% Injectable 25 Gram(s) IV Push once  HYDROmorphone PCA (1 mG/mL) 30 milliLiter(s) PCA Continuous PCA Continuous  insulin lispro (HumaLOG) corrective regimen sliding scale   SubCutaneous every 6 hours  mycophenolate mofetil 1000 milliGRAM(s) Oral once  sodium chloride 0.45%. 500 milliLiter(s) (50 mL/Hr) IV Continuous <Continuous>  sodium chloride 0.9%. 1000 milliLiter(s) (70 mL/Hr) IV Continuous <Continuous>    MEDICATIONS  (PRN):  dextrose 40% Gel 15 Gram(s) Oral once PRN Blood Glucose LESS THAN 70 milliGRAM(s)/deciliter  glucagon  Injectable 1 milliGRAM(s) IntraMuscular once PRN Glucose LESS THAN 70 milligrams/deciliter  HYDROmorphone  Injectable 0.5 milliGRAM(s) IV Push every 10 minutes PRN Moderate Pain (4 - 6)  HYDROmorphone PCA (1 mG/mL) Rescue Clinician Bolus 0.5 milliGRAM(s) IV Push every 15 minutes PRN for Pain Scale GREATER THAN 6  naloxone Injectable 0.1 milliGRAM(s) IV Push every 3 minutes PRN For ANY of the following changes in patient status:  A. RR LESS THAN 10 breaths per minute, B. Oxygen saturation LESS THAN 90%, C. Sedation score of 6  ondansetron Injectable 4 milliGRAM(s) IV Push every 6 hours PRN Nausea and/or Vomiting  ondansetron Injectable 4 milliGRAM(s) IV Push every 6 hours PRN Nausea  ondansetron Injectable 4 milliGRAM(s) IV Push once PRN Nausea and/or Vomiting      PAST MEDICAL & SURGICAL HISTORY:  Anemia, unspecified type  DM (diabetes mellitus)  ESRD (end stage renal disease) on dialysis: M W,F  Hypercholesterolemia  HTN (hypertension)  AV fistula: L forearm  H/O eye surgery      Vital Signs Last 24 Hrs  T(C): 37.5 (22 Oct 2019 17:00), Max: 37.5 (22 Oct 2019 17:00)  T(F): 99.5 (22 Oct 2019 17:00), Max: 99.5 (22 Oct 2019 17:00)  HR: 87 (22 Oct 2019 17:00) (86 - 105)  BP: 123/66 (22 Oct 2019 17:00) (113/65 - 167/98)  BP(mean): 88 (22 Oct 2019 17:00) (82 - 99)  RR: 16 (22 Oct 2019 17:00) (16 - 18)  SpO2: 99% (22 Oct 2019 17:00) (96% - 99%)    I&O's Summary    22 Oct 2019 07:01  -  22 Oct 2019 17:14  --------------------------------------------------------  IN: 210 mL / OUT: 31 mL / NET: 179 mL                              9.9    14.10 )-----------( 178      ( 22 Oct 2019 14:46 )             31.6     10-22    138  |  107  |  43<H>  ----------------------------<  138<H>  5.3   |  20<L>  |  8.94<H>    Ca    7.8<L>      22 Oct 2019 14:46  Phos  2.5     10-22  Mg     2.2     10-22    TPro  5.6<L>  /  Alb  3.4  /  TBili  0.3  /  DBili  x   /  AST  15  /  ALT  8<L>  /  AlkPhos  136<H>  10-22        Review of systems  Gen: No weight changes, fatigue, fevers/chills, weakness  Skin: No rashes  Head/Eyes/Ears/Mouth: No headache; Normal hearing; Normal vision w/o blurriness; No sinus pain/discomfort, sore throat  Respiratory: No dyspnea, cough, wheezing, hemoptysis  CV: No chest pain, PND, orthopnea  GI: C/O mild abdominal pain at surgical site. no diarrhea, constipation, nausea, vomiting, melena, hematochezia  : No increased frequency, dysuria, hematuria, nocturia  MSK: No joint pain/swelling; no back pain; no edema  Neuro: No dizziness/lightheadedness, weakness, seizures, numbness, tingling  Heme: No easy bruising or bleeding  Endo: No heat/cold intolerance  Psych: No significant nervousness, anxiety, stress, depression  All other systems were reviewed and are negative, except as noted.      PHYSICAL EXAM:  Constitutional: Well developed / well nourished  Eyes: Anicteric, PERRLA  ENMT: nc/at  Neck: no central line  Respiratory: CTA B/L  Cardiovascular: RRR  Gastrointestinal: Soft abdomen, ND, appropriate incisional TTP. incision c/d/i. JPx1 ss.  Genitourinary: Urinary catheter in place with minimal urine (mild hematuria)  Extremities: SCD's in place and working bilaterally  Vascular: Palpable dp pulses bilaterally. L AVF palpable  Neurological: A&O x3  Skin: Wound open to air no erythema and evidence of infection noted  Musculoskeletal: Moving all extremities  Psychiatric: Responsive

## 2019-10-22 NOTE — H&P ADULT - ASSESSMENT
39 year old male with PMHx of DM 2(2000) on insulin since 2011, EDRD on HD M/W/F since 2013 (last HD Monday 10/22) Via Left forearm AVF, HTN (2013),HDL, Retinopathy,  Cataract surgery and laser eye surgery presents today for a  HCV+ DDRT with Simulect induction         Plan:    -Admit to Transplant Surgery    -CXR/EKG   -Pre-op labs: CBC/CMP/PTT-INR/T&S/Hep Serologies/VBG   -Consent for Surgery    -Plan for OR at 07:00 with Dr. Greenberg

## 2019-10-22 NOTE — PROGRESS NOTE ADULT - ASSESSMENT
39M with PMHx of IDDM (2000) on insulin since 2011, ESRD on HD M/W/F since 2013 (last HD Monday 10/22) Via Left forearm AVF, HTN (2013), HLD, Retinopathy,  Cataract surgery and laser eye surgery s/p HCV+ DDRT with Simulect induction on 10/22. ureter stented.        POD#0 s/p HCV+ DDRT  -trend labs in 6h, K 5.3 post-op. h/h appropriate.   -post-op u/s in PACU with good flow, f/u official report  -strict I&Os via hightower/RONALD. ok to flush hightower. no STAT locks  -IVF as ordered  -ASA to start in AM  -dPCA for pain control o/n  -SCDs on at all times while in bed. no SQH. encourage spirometry  -Immuno: Envarsus 4, MMF 1000/1000, Pred taper. Simulect POD#4  -PPx: Bactrim/Valcyte/Nystatin/Pepcid    DM  -trend fingersticks as ordered  -on ARLIN, will adjust accordingly    Dispo  -[facility] tele overnight once cleared from PACU

## 2019-10-22 NOTE — H&P ADULT - NSHPPHYSICALEXAM_GEN_ALL_CORE
PHYSICAL EXAM:  Constitutional: Well developed / well nourished  Eyes: Anicteric, PERRLA  ENMT: nc/at  Neck: Supple  Respiratory: CTA B/L  Cardiovascular: RRR  Gastrointestinal: Soft, nontender, nondistended, +BSx4   Genitourinary: Voids minimal amounts of urine   Extremities: SCD's in place and working bilaterally  Vascular: Palpable dp pulses bilaterally  Neurological: A&O x3  Skin: Warm, dry and intact throughout ,no rashes notes  Musculoskeletal: Moving all extremities  Psychiatric: Responsive

## 2019-10-22 NOTE — H&P ADULT - ATTENDING COMMENTS
BRENTON ROSADO was seen and evaluated today.  As documented, BRENTON ROSADO is a 39y Male with likely DM2 causing end-stage renal disease.  A  donor organ has been made available to him and he has agreed to proceed with renal transplantation. He has had no major illnesses or hospitalizations since he was last seen in the clinic.    We discussed the risks and benefits of kidney transplantation in depth.  Surgical risks included but were not limited to bleeding, infection, graft thrombosis, delayed graft function, urine leak, and potential need for re operation postoperatively.  We also discussed the risks of postoperative immunosuppression including but not limited to increased risk of infection, cancer, weight gain, new onset or worsening of diabetes or hypertension on a temporary or permanent basis, water retention, back pain, constipation, diarrhea, dizziness, headache, joint pain, loss of appetite, nausea, stomach pain or upset, trouble sleeping, vomiting, and/or mental or mood changes were fully disclosed. BRENTON ROSADO understands these risks and is willing to proceed with kidney transplantation. We discussed the differences in quality of life and patient survival between remaining on dialysis versus receiving a kidney transplant.     BRENTON ROSADO is aware that this organ is deemed PHS increased risk and is hepatitis C virus positive.  As such there is a risk that though the donor has negative serology and nucleic acid testing for HIV or HBV the donor may be within the window period for infection.  The patient was advised that this risk of this is <0.1% for HIV. He was further advised that he will contract the Heptitis C Virus during transplantation.  He will be treated as an outpatient, with pharmaceutical coverage through his outpatient pharmaceutical benefit provider.  He is aware of the ~99% treatment efficacy for direct acting HCV antiviral medication. He is aware of the need to refrain from sexual behavior or drug use during the period of HCV treatment (from initiation of therapy through 12 weeks of surveillance.   BRENTON ROSADO was given time to ask questions.  He then signed surgical consent and agreed to proceed.

## 2019-10-22 NOTE — PROGRESS NOTE ADULT - SUBJECTIVE AND OBJECTIVE BOX
BRENTON ROSADO is a 39y Male s/p DDRT on 10/22/19. PMH is significant for HTN, T2DM    NKDA  CMV +/+    Transplant Medications  Induction  -Basiliximab 20 mg POD 0 (given in OR) and POD 4  -Methyprednisolone taper (switch to PO prednisone on POD 4)            POD 0: 500 mg IV in OR            POD 1: 125 mg IV Q12H            POD 2: 60 mg IV Q12H            POD 3: 30 mg IV Q12H        Maintenance Immunosuppression  - Envarsus XR 0.14 mg/kg/dose daily (Adjust for goal trough: 8-10)  -Mycophenolate 1,000 mg PO Q12H  -Prednisone             POD 4: 20 mg PO Q12H            POD 5: 10 mg PO Q12H            POD 6: 5 mg PO Q12H            POD 7: 5 mg daily     Anti-infection   -Bactrim SS tablet (frequency based on renal function)  -Valganciclovir (dose based on CMV serostatus and frequency based on renal function)  -Nystatin swish and swallow 5 mL four times daily    Surgical prophylaxis pre- and intra-operative dosing  -Cefazolin    Prophylaxis  -GI ppx: famotidine 20 mg daily  -Bowel ppx: senna/colace  -DVT: sequential compression device  -Pain:            Mild: Acetaminophen 650 mg every 6 hours PRN           Moderate: Tramadol 25 mg every 4 hours PRN (adjust for renal function)           Severe: Tramadol 50 mg every 4 hours PRN (adjust for renal function)    Home medications  Levemir 10 units bedtime  Novolog 4 units TID  Aspirin 81 mg daily  Gabapentin 100 mg daily  simvastatin 20 mg at bedtime  hydralazine 100 mg twice daily  amlodipine 5 mg daily    Outpatient medication reconciliation reviewed and will be re-started appropriately.  Plan discussed with multidisciplinary team.

## 2019-10-22 NOTE — H&P ADULT - NSICDXPASTMEDICALHX_GEN_ALL_CORE_FT
PAST MEDICAL HISTORY:  Anemia, unspecified type     DM (diabetes mellitus)     ESRD (end stage renal disease) on dialysis M W,F    HTN (hypertension)     Hypercholesterolemia

## 2019-10-22 NOTE — PRE-ANESTHESIA EVALUATION ADULT - NSANTHOSAYNRD_GEN_A_CORE
No. SHANDRA screening performed.  STOP BANG Legend: 0-2 = LOW Risk; 3-4 = INTERMEDIATE Risk; 5-8 = HIGH Risk

## 2019-10-22 NOTE — PATIENT PROFILE ADULT - NSPROPTRIGHTBILLOFRIGHTS_GEN_A_NUR
Encounter addended by: Ada Scott, PT on: 6/6/2019 3:00 PM   Actions taken: Flowsheet accepted
patient

## 2019-10-22 NOTE — H&P ADULT - HISTORY OF PRESENT ILLNESS
39 year old male with PMHx of DM 2 () on insulin since ,EDRD on HD M/W/F since  (last HD Monday 10/22) Via Left forearm AVF, HTN (),HDL, Retinopathy,  Cataract surgery and laser eye surgery presents today for a  HCV+ DDRT with Simulect induction         EKG: 10/4/2018 NSR   Stress: 11/15/2018- NSR with no significant ST abnormalities   ECHO: 10/18/2018- EF 66%  Mild MR. Normal LV internal dimensions and wall thicknesses Normal LV systolic function. No segmental wall motion abnormalities. Normal RV size and function.   CATH: 16 Proximal LAD minur luminar irregularities with no flow limiting lesions, RCA large sized minor luminal irregularieties with no flow limiting lesions.   CT Abdomen – 10/1/2019 - The abdominal aorta is nonaneurysmal with mild atherosclerotic changes. The celiac axis, superior mesenteric artery, inferior mesenteric artery and renal arteries are patent without stenosis. The iliac arteries show no evidence of stenosis the internal and external iliacs are patent without focal abnormality. The deep venous system including IVC and iliac veins appear to be within normal limits No evidence of vascular stenosis or venous thrombosis. 5 mm right lower lobe nodule new when compared to prior abdominal pelvic     Recipient:  Jerardo Mcghee   /Age:  1980 age 39   Hx: ESRD on HD since , HTN, T2DM, Retinopathy, Anemia,   Nephrologist: ,   CPRA: 0%   ABO: O+   CMV/EBV Status: CMV +, EBV +   Last HD: Monday 10/21/19   NPO   since 10/21/2019 @ 2130   Pt Location on route [facility]   Financially cleared - yes   Recipient OR   - 10/22/2019 at 0700   Surgeon: Dr. Michele Greenberg     Donor (import?): yes -   Donor ID: [id number]   Match:  [id number] then local match [id number]   [facility]: [facility] [place]   Age: 41   ABO:  O   KDPI: 44%   COD:  Anoxia/Seizure   X Clamp Time  - 10/21/2019 – 04:53   Medical Hx:  ex- IVDU on methadone, kidney stones, UTI, ureter stent, HTN during pregnancy, smoker, anti - HCV + , HCV ARCADIO + , HBsAb +   Terminal Creatinine – 0.5   CMV +/EBV +   Kidney ETA to [facility]: 10/22/19 at 0600   Kidney Laterality   - Left   X-match – virtual negative, no sensitizing events

## 2019-10-22 NOTE — CONSULT NOTE ADULT - ASSESSMENT
39M PMHx ESRD 2/2 DM on HD (last dialyzed 10/22 via LUE AVF), IDDM2 (dx 2000) c/b retinopathy, HTN (2013), HLD admitted for HCV+ DDRT with Simulect induction.      Recipient   ABO O type, CPRA: 0%   CMV/EBV Status: CMV +, EBV +   Last HD: Monday 10/21/19     Donor (import?):  Donor ID: [id number]   Match:  [id number] then local match [id number]   [facility]: [facility] [place]   Age: 41   ABO:  O   KDPI: 44%   COD:  Anoxia/Seizure   X Clamp Time  - 10/21/2019 – 04:53   Medical Hx:  ex- IVDU on methadone, kidney stones, UTI, ureter stent, HTN during pregnancy, smoker, anti - HCV + , HCV ARCADIO + , HBsAb +   Terminal Creatinine – 0.5   CIT: 30 hours  CMV +/EBV +   Kidney ETA to [facility]: 10/22/19 at 0600   Kidney Laterality   -Left

## 2019-10-22 NOTE — CONSULT NOTE ADULT - ATTENDING COMMENTS
Kidney recipient post op day 0  Reviewed pre transplant work up,  donor data and operative details. Reviewed plan of immunosuppression.  Patient has functioning hemodialysis access  Clinical and available lab data reviewed, I/O noted.  Patient seen in PACU  Plan:  Monitor respiratory status, electrolytes, I/O, blood pressure  Glycemic control regimen noted  Will follow  I was present during and reviewed clinical and lab data as well as assessment and plan as documented by the house staff as noted. Please contact if any additional questions with any change in clinical condition or on availability of any additional information or reports to reevaluate for dialysis need.

## 2019-10-22 NOTE — PROVIDER CONTACT NOTE (CRITICAL VALUE NOTIFICATION) - BACKGROUND
pt s/p kidney transplant, hyperkalemic in OR, repeat BMP sent potassium 7.3. NP from transplant team made aware.

## 2019-10-22 NOTE — CONSULT NOTE ADULT - SUBJECTIVE AND OBJECTIVE BOX
[facility] DIVISION OF KIDNEY DISEASES AND HYPERTENSION -- INITIAL CONSULT NOTE  --------------------------------------------------------------------------------  HPI:  39M PMHx ESRD 2/2 DM on HD (last dialyzed 10/22 via LUE AVF), IDDM2 (dx 2000) c/b retinopathy, HTN (2013), HLD admitted for HCV+ DDRT with Simulect induction.      Recipient   ABO O type, CPRA: 0%   CMV/EBV Status: CMV +, EBV +   Last HD: Monday 10/21/19     Donor (import?):  Donor ID: [id number]   Match:  [id number] then local match [id number]   OPO: [facility] [place]   Age: 41   ABO:  O   KDPI: 44%   COD:  Anoxia/Seizure   X Clamp Time  - 10/21/2019 – 04:53   Medical Hx:  ex- IVDU on methadone, kidney stones, UTI, ureter stent, HTN during pregnancy, smoker, anti - HCV + , HCV ARCADIO + , HBsAb +   Terminal Creatinine – 0.5   CMV +/EBV +   Kidney ETA to [facility]: 10/22/19 at 0600   Kidney Laterality   -Left   X-match – virtual negative, no sensitizing events         PAST HISTORY  --------------------------------------------------------------------------------  PAST MEDICAL & SURGICAL HISTORY:  Anemia, unspecified type  DM (diabetes mellitus)  ESRD (end stage renal disease) on dialysis: M W,F  Hypercholesterolemia  HTN (hypertension)  AV fistula: L forearm  H/O eye surgery    FAMILY HISTORY:  Family history of diabetes mellitus (DM)    PAST SOCIAL HISTORY:    ALLERGIES & MEDICATIONS  --------------------------------------------------------------------------------  Allergies    No Known Allergies    Intolerances      Standing Inpatient Medications  chlorhexidine 4% Liquid 1 Application(s) Topical <User Schedule>  dextrose 5%. 1000 milliLiter(s) IV Continuous <Continuous>  dextrose 50% Injectable 12.5 Gram(s) IV Push once  dextrose 50% Injectable 25 Gram(s) IV Push once  dextrose 50% Injectable 25 Gram(s) IV Push once  HYDROmorphone PCA (1 mG/mL) 30 milliLiter(s) PCA Continuous PCA Continuous  insulin lispro (HumaLOG) corrective regimen sliding scale   SubCutaneous every 6 hours  sodium chloride 0.45%. 500 milliLiter(s) IV Continuous <Continuous>  sodium chloride 0.9%. 1000 milliLiter(s) IV Continuous <Continuous>    PRN Inpatient Medications  dextrose 40% Gel 15 Gram(s) Oral once PRN  glucagon  Injectable 1 milliGRAM(s) IntraMuscular once PRN  HYDROmorphone  Injectable 0.5 milliGRAM(s) IV Push every 10 minutes PRN  HYDROmorphone PCA (1 mG/mL) Rescue Clinician Bolus 0.5 milliGRAM(s) IV Push every 15 minutes PRN  naloxone Injectable 0.1 milliGRAM(s) IV Push every 3 minutes PRN  ondansetron Injectable 4 milliGRAM(s) IV Push every 6 hours PRN  ondansetron Injectable 4 milliGRAM(s) IV Push every 6 hours PRN  ondansetron Injectable 4 milliGRAM(s) IV Push once PRN      REVIEW OF SYSTEMS  unable to obtain OR      All other systems were reviewed and are negative, except as noted.    VITALS/PHYSICAL EXAM  --------------------------------------------------------------------------------  T(C): 37.2 (10-22-19 @ 13:45), Max: 37.2 (10-22-19 @ 13:45)  HR: 100 (10-22-19 @ 14:00) (86 - 105)  BP: 132/79 (10-22-19 @ 14:00) (119/68 - 167/98)  RR: 16 (10-22-19 @ 14:00) (16 - 18)  SpO2: 97% (10-22-19 @ 14:00) (96% - 98%)  Wt(kg): --  Height (cm): 160.02 (10-22-19 @ 07:18)  Weight (kg): 57.2 (10-22-19 @ 07:18)  BMI (kg/m2): 22.3 (10-22-19 @ 07:18)  BSA (m2): 1.59 (10-22-19 @ 07:18)      Physical Exam:  seen in OR    LABS/STUDIES  --------------------------------------------------------------------------------              13.1   7.91  >-----------<  207      [10-22-19 @ 05:47]              39.7     138  |  92  |  47  ----------------------------<  168      [10-22-19 @ 05:48]  4.9   |  29  |  9.46        Ca     8.5     [10-22-19 @ 05:48]      Mg     2.9     [10-22-19 @ 05:48]      Phos  3.2     [10-22-19 @ 05:48]    TPro  7.9  /  Alb  4.8  /  TBili  0.4  /  DBili  0.1  /  AST  19  /  ALT  10  /  AlkPhos  208  [10-22-19 @ 05:48]    PT/INR: PT 9.6  , INR 0.85       [10-22-19 @ 05:47]  PTT: 30.6       [10-22-19 @ 05:47]      Creatinine Trend:  SCr 9.46 [10-22 @ 05:48]    Urinalysis - [11-17-15 @ 18:04]      Color Yellow / Appearance Clear / SG 1.017 / pH 8.0      Gluc 250 / Ketone Negative  / Bili Negative / Urobili Normal       Blood Negative / Protein 300 / Leuk Est Negative / Nitrite Negative      RBC 2 / WBC 8 / Hyaline 0 / Gran  / Sq Epi  / Non Sq Epi 3 / Bacteria Negative      HbA1c 7.4      [06-21-16 @ 08:39]    HBsAb <3.0      [11-17-15 @ 17:59]  HBsAb Nonreact      [11-17-15 @ 17:59]  HBsAg Nonreact      [10-22-19 @ 09:21]  HBcAb Nonreact      [11-17-15 @ 17:59]  HCV 0.10, Nonreact      [10-22-19 @ 09:21]  HIV Nonreact      [11-17-15 @ 18:04] [facility] DIVISION OF KIDNEY DISEASES AND HYPERTENSION -- INITIAL CONSULT NOTE  --------------------------------------------------------------------------------  HPI:  39M PMHx ESRD 2/2 DM on HD (last dialyzed 10/22 via LUE AVF), IDDM2 (dx 2000) c/b retinopathy, HTN (2013), HLD admitted for HCV+ DDRT with Simulect induction.      Recipient   ABO O type, CPRA: 0%   CMV/EBV Status: CMV +, EBV +   Last HD: Monday 10/21/19     Donor (import?):  Donor ID: [id number]   Match:  [id number] then local match [id number]   OPO: [facility] [place], [place]   Age: 41   ABO:  O   KDPI: 44%   COD:  Anoxia/Seizure   X Clamp Time  - 10/21/2019 – 04:53   Medical Hx:  ex- IVDU on methadone, kidney stones, UTI, ureter stent, HTN during pregnancy, smoker, anti - HCV + , HCV ARCADIO + , HBsAb +   Terminal Creatinine – 0.5   CMV +/EBV +   Kidney ETA to [facility]: 10/22/19 at 0600   Kidney Laterality   -Left   X-match – virtual negative, no sensitizing events         PAST HISTORY  --------------------------------------------------------------------------------  PAST MEDICAL & SURGICAL HISTORY:  Anemia, unspecified type  DM (diabetes mellitus)  ESRD (end stage renal disease) on dialysis: M W,F  Hypercholesterolemia  HTN (hypertension)  AV fistula: L forearm  H/O eye surgery    FAMILY HISTORY:  Family history of diabetes mellitus (DM)    PAST SOCIAL HISTORY:    ALLERGIES & MEDICATIONS  --------------------------------------------------------------------------------  Allergies    No Known Allergies    Intolerances      Standing Inpatient Medications  chlorhexidine 4% Liquid 1 Application(s) Topical <User Schedule>  dextrose 5%. 1000 milliLiter(s) IV Continuous <Continuous>  dextrose 50% Injectable 12.5 Gram(s) IV Push once  dextrose 50% Injectable 25 Gram(s) IV Push once  dextrose 50% Injectable 25 Gram(s) IV Push once  HYDROmorphone PCA (1 mG/mL) 30 milliLiter(s) PCA Continuous PCA Continuous  insulin lispro (HumaLOG) corrective regimen sliding scale   SubCutaneous every 6 hours  sodium chloride 0.45%. 500 milliLiter(s) IV Continuous <Continuous>  sodium chloride 0.9%. 1000 milliLiter(s) IV Continuous <Continuous>    PRN Inpatient Medications  dextrose 40% Gel 15 Gram(s) Oral once PRN  glucagon  Injectable 1 milliGRAM(s) IntraMuscular once PRN  HYDROmorphone  Injectable 0.5 milliGRAM(s) IV Push every 10 minutes PRN  HYDROmorphone PCA (1 mG/mL) Rescue Clinician Bolus 0.5 milliGRAM(s) IV Push every 15 minutes PRN  naloxone Injectable 0.1 milliGRAM(s) IV Push every 3 minutes PRN  ondansetron Injectable 4 milliGRAM(s) IV Push every 6 hours PRN  ondansetron Injectable 4 milliGRAM(s) IV Push every 6 hours PRN  ondansetron Injectable 4 milliGRAM(s) IV Push once PRN      REVIEW OF SYSTEMS  unable to obtain OR      All other systems were reviewed and are negative, except as noted.    VITALS/PHYSICAL EXAM  --------------------------------------------------------------------------------  T(C): 37.2 (10-22-19 @ 13:45), Max: 37.2 (10-22-19 @ 13:45)  HR: 100 (10-22-19 @ 14:00) (86 - 105)  BP: 132/79 (10-22-19 @ 14:00) (119/68 - 167/98)  RR: 16 (10-22-19 @ 14:00) (16 - 18)  SpO2: 97% (10-22-19 @ 14:00) (96% - 98%)  Wt(kg): --  Height (cm): 160.02 (10-22-19 @ 07:18)  Weight (kg): 57.2 (10-22-19 @ 07:18)  BMI (kg/m2): 22.3 (10-22-19 @ 07:18)  BSA (m2): 1.59 (10-22-19 @ 07:18)      Physical Exam:  seen in PACU   Conscious,alert  HENT:No acute signs  Neck:No JVD  Chest: breath sounds well heard bilaterally  CV: No rub/gallop  Abd: Right LQ renal allograft; Drain +  Ext: No acute signs Left UE AVF noted  Neuro: A, O X3, no asterixis        LABS/STUDIES  --------------------------------------------------------------------------------              13.1   7.91  >-----------<  207      [10-22-19 @ 05:47]              39.7     138  |  92  |  47  ----------------------------<  168      [10-22-19 @ 05:48]  4.9   |  29  |  9.46        Ca     8.5     [10-22-19 @ 05:48]      Mg     2.9     [10-22-19 @ 05:48]      Phos  3.2     [10-22-19 @ 05:48]    TPro  7.9  /  Alb  4.8  /  TBili  0.4  /  DBili  0.1  /  AST  19  /  ALT  10  /  AlkPhos  208  [10-22-19 @ 05:48]    PT/INR: PT 9.6  , INR 0.85       [10-22-19 @ 05:47]  PTT: 30.6       [10-22-19 @ 05:47]      Creatinine Trend:  SCr 9.46 [10-22 @ 05:48]    Urinalysis - [11-17-15 @ 18:04]      Color Yellow / Appearance Clear / SG 1.017 / pH 8.0      Gluc 250 / Ketone Negative  / Bili Negative / Urobili Normal       Blood Negative / Protein 300 / Leuk Est Negative / Nitrite Negative      RBC 2 / WBC 8 / Hyaline 0 / Gran  / Sq Epi  / Non Sq Epi 3 / Bacteria Negative      HbA1c 7.4      [06-21-16 @ 08:39]    HBsAb <3.0      [11-17-15 @ 17:59]  HBsAb Nonreact      [11-17-15 @ 17:59]  HBsAg Nonreact      [10-22-19 @ 09:21]  HBcAb Nonreact      [11-17-15 @ 17:59]  HCV 0.10, Nonreact      [10-22-19 @ 09:21]  HIV Nonreact      [11-17-15 @ 18:04] [facility] DIVISION OF KIDNEY DISEASES AND HYPERTENSION -- INITIAL CONSULT NOTE  --------------------------------------------------------------------------------  HPI:  39M PMHx ESRD 2/2 DM on HD (last dialyzed 10/22 via LUE AVF), IDDM2 (dx 2000) c/b retinopathy, HTN (2013), HLD admitted for HCV+ DDRT with Simulect induction.  Post op K 5.3.    Recipient   ABO O type, CPRA: 0%   CMV/EBV Status: CMV +, EBV +   Last HD: Monday 10/21/19     Donor (import?):  Donor ID: [id number]   Match:  [id number] then local match [id number]   OPO: [facility] – [place], [place]   Age: 41   ABO:  O   KDPI: 44%   COD:  Anoxia/Seizure   X Clamp Time  - 10/21/2019 – 04:53   Medical Hx:  ex- IVDU on methadone, kidney stones, UTI, ureter stent, HTN during pregnancy, smoker, anti - HCV + , HCV ARCADIO + , HBsAb +   Terminal Creatinine – 0.5   CMV +/EBV +   Kidney ETA to [facility]: 10/22/19 at 0600   Kidney Laterality   -Left   X-match – virtual negative, no sensitizing events         PAST HISTORY  --------------------------------------------------------------------------------  PAST MEDICAL & SURGICAL HISTORY:  Anemia, unspecified type  DM (diabetes mellitus)  ESRD (end stage renal disease) on dialysis: M W,F  Hypercholesterolemia  HTN (hypertension)  AV fistula: L forearm  H/O eye surgery    FAMILY HISTORY:  Family history of diabetes mellitus (DM)    PAST SOCIAL HISTORY:    ALLERGIES & MEDICATIONS  --------------------------------------------------------------------------------  Allergies    No Known Allergies    Intolerances      Standing Inpatient Medications  chlorhexidine 4% Liquid 1 Application(s) Topical <User Schedule>  dextrose 5%. 1000 milliLiter(s) IV Continuous <Continuous>  dextrose 50% Injectable 12.5 Gram(s) IV Push once  dextrose 50% Injectable 25 Gram(s) IV Push once  dextrose 50% Injectable 25 Gram(s) IV Push once  HYDROmorphone PCA (1 mG/mL) 30 milliLiter(s) PCA Continuous PCA Continuous  insulin lispro (HumaLOG) corrective regimen sliding scale   SubCutaneous every 6 hours  sodium chloride 0.45%. 500 milliLiter(s) IV Continuous <Continuous>  sodium chloride 0.9%. 1000 milliLiter(s) IV Continuous <Continuous>    PRN Inpatient Medications  dextrose 40% Gel 15 Gram(s) Oral once PRN  glucagon  Injectable 1 milliGRAM(s) IntraMuscular once PRN  HYDROmorphone  Injectable 0.5 milliGRAM(s) IV Push every 10 minutes PRN  HYDROmorphone PCA (1 mG/mL) Rescue Clinician Bolus 0.5 milliGRAM(s) IV Push every 15 minutes PRN  naloxone Injectable 0.1 milliGRAM(s) IV Push every 3 minutes PRN  ondansetron Injectable 4 milliGRAM(s) IV Push every 6 hours PRN  ondansetron Injectable 4 milliGRAM(s) IV Push every 6 hours PRN  ondansetron Injectable 4 milliGRAM(s) IV Push once PRN      REVIEW OF SYSTEMS  unable to obtain OR      All other systems were reviewed and are negative, except as noted.    VITALS/PHYSICAL EXAM  --------------------------------------------------------------------------------  T(C): 37.2 (10-22-19 @ 13:45), Max: 37.2 (10-22-19 @ 13:45)  HR: 100 (10-22-19 @ 14:00) (86 - 105)  BP: 132/79 (10-22-19 @ 14:00) (119/68 - 167/98)  RR: 16 (10-22-19 @ 14:00) (16 - 18)  SpO2: 97% (10-22-19 @ 14:00) (96% - 98%)  Wt(kg): --  Height (cm): 160.02 (10-22-19 @ 07:18)  Weight (kg): 57.2 (10-22-19 @ 07:18)  BMI (kg/m2): 22.3 (10-22-19 @ 07:18)  BSA (m2): 1.59 (10-22-19 @ 07:18)      Physical Exam:  seen in PACU   Conscious,alert  HENT:No acute signs  Neck:No JVD  Chest: breath sounds well heard bilaterally  CV: No rub/gallop  Abd: Right LQ renal allograft; Drain +  Ext: No acute signs Left UE AVF noted  Neuro: A, O X3, no asterixis        LABS/STUDIES  --------------------------------------------------------------------------------              13.1   7.91  >-----------<  207      [10-22-19 @ 05:47]              39.7     138  |  92  |  47  ----------------------------<  168      [10-22-19 @ 05:48]  4.9   |  29  |  9.46        Ca     8.5     [10-22-19 @ 05:48]      Mg     2.9     [10-22-19 @ 05:48]      Phos  3.2     [10-22-19 @ 05:48]    TPro  7.9  /  Alb  4.8  /  TBili  0.4  /  DBili  0.1  /  AST  19  /  ALT  10  /  AlkPhos  208  [10-22-19 @ 05:48]    PT/INR: PT 9.6  , INR 0.85       [10-22-19 @ 05:47]  PTT: 30.6       [10-22-19 @ 05:47]      Creatinine Trend:  SCr 9.46 [10-22 @ 05:48]    Urinalysis - [11-17-15 @ 18:04]      Color Yellow / Appearance Clear / SG 1.017 / pH 8.0      Gluc 250 / Ketone Negative  / Bili Negative / Urobili Normal       Blood Negative / Protein 300 / Leuk Est Negative / Nitrite Negative      RBC 2 / WBC 8 / Hyaline 0 / Gran  / Sq Epi  / Non Sq Epi 3 / Bacteria Negative      HbA1c 7.4      [06-21-16 @ 08:39]    HBsAb <3.0      [11-17-15 @ 17:59]  HBsAb Nonreact      [11-17-15 @ 17:59]  HBsAg Nonreact      [10-22-19 @ 09:21]  HBcAb Nonreact      [11-17-15 @ 17:59]  HCV 0.10, Nonreact      [10-22-19 @ 09:21]  HIV Nonreact      [11-17-15 @ 18:04]

## 2019-10-22 NOTE — CHART NOTE - NSCHARTNOTEFT_GEN_A_CORE
Called to bedside by Primary RN as pt " spit out his bridge" Bridge appears to be complete. On inspection, pt appears to have two missing lower teeth and two upper teeth. Pt feels that they may have been like that, but not sure.  D/W Dr Handley. PCXR done, no obvious signs of neither dental hardware nor teeth. Dental consult called. Primary team aware of findings.

## 2019-10-22 NOTE — BRIEF OPERATIVE NOTE - OPERATION/FINDINGS
donor renal transplant to right iliac vessels  Two arteries, anastomosed separately, single vein, Extramedullary renal pelvis with single ureter    UNOS ID: [id number]  Match:  [id number] then local match [id number]  [facility]: [facility]  ABO:  O  KDPI: 44%  Cold Ischemia time: 30  CMV +/EBV +  donor renal transplant to right iliac vessels  Two arteries, anastomosed separately, single vein, Extramedullary renal pelvis with single ureter  Placement of double J ureteral stent    UNOS ID: [id number]  Match:  [id number] then local match [id number]  [facility]: [facility]  ABO:  O  KDPI: 44%  Cold Ischemia time: 30  CMV +/EBV +  donor renal transplant to right iliac vessels  Two arteries, anastomosed separately, single vein, Extramedullary renal pelvis with single ureter  Placement of double J ureteral stent    UNOS ID: [id number]  Match:  [id number] then local match [id number]  [facility]: [facility]  ABO:  O  KDPI: 44%  Cold Ischemia time: 30 hrs  CMV +/EBV +

## 2019-10-23 DIAGNOSIS — E87.5 HYPERKALEMIA: ICD-10-CM

## 2019-10-23 LAB
ALBUMIN SERPL ELPH-MCNC: 3.4 G/DL — SIGNIFICANT CHANGE UP (ref 3.3–5)
ALP SERPL-CCNC: 116 U/L — SIGNIFICANT CHANGE UP (ref 40–120)
ALT FLD-CCNC: 12 U/L — SIGNIFICANT CHANGE UP (ref 10–45)
ANION GAP SERPL CALC-SCNC: 20 MMOL/L — HIGH (ref 5–17)
AST SERPL-CCNC: 43 U/L — HIGH (ref 10–40)
BASOPHILS # BLD AUTO: 0.04 K/UL — SIGNIFICANT CHANGE UP (ref 0–0.2)
BASOPHILS NFR BLD AUTO: 0.3 % — SIGNIFICANT CHANGE UP (ref 0–2)
BILIRUB SERPL-MCNC: 0.3 MG/DL — SIGNIFICANT CHANGE UP (ref 0.2–1.2)
BUN SERPL-MCNC: 18 MG/DL — SIGNIFICANT CHANGE UP (ref 7–23)
CALCIUM SERPL-MCNC: 7.9 MG/DL — LOW (ref 8.4–10.5)
CHLORIDE SERPL-SCNC: 94 MMOL/L — LOW (ref 96–108)
CO2 SERPL-SCNC: 25 MMOL/L — SIGNIFICANT CHANGE UP (ref 22–31)
CREAT SERPL-MCNC: 4.95 MG/DL — HIGH (ref 0.5–1.3)
EOSINOPHIL # BLD AUTO: 0.01 K/UL — SIGNIFICANT CHANGE UP (ref 0–0.5)
EOSINOPHIL NFR BLD AUTO: 0.1 % — SIGNIFICANT CHANGE UP (ref 0–6)
GLUCOSE BLDC GLUCOMTR-MCNC: 106 MG/DL — HIGH (ref 70–99)
GLUCOSE BLDC GLUCOMTR-MCNC: 110 MG/DL — HIGH (ref 70–99)
GLUCOSE BLDC GLUCOMTR-MCNC: 199 MG/DL — HIGH (ref 70–99)
GLUCOSE BLDC GLUCOMTR-MCNC: 252 MG/DL — HIGH (ref 70–99)
GLUCOSE BLDC GLUCOMTR-MCNC: 299 MG/DL — HIGH (ref 70–99)
GLUCOSE SERPL-MCNC: 106 MG/DL — HIGH (ref 70–99)
HBA1C BLD-MCNC: 6.5 % — HIGH (ref 4–5.6)
HCT VFR BLD CALC: 30.8 % — LOW (ref 39–50)
HCT VFR BLD CALC: 31.8 % — LOW (ref 39–50)
HGB BLD-MCNC: 10 G/DL — LOW (ref 13–17)
HGB BLD-MCNC: 9.8 G/DL — LOW (ref 13–17)
IMM GRANULOCYTES NFR BLD AUTO: 0.5 % — SIGNIFICANT CHANGE UP (ref 0–1.5)
LYMPHOCYTES # BLD AUTO: 0.9 K/UL — LOW (ref 1–3.3)
LYMPHOCYTES # BLD AUTO: 7.3 % — LOW (ref 13–44)
MAGNESIUM SERPL-MCNC: 1.9 MG/DL — SIGNIFICANT CHANGE UP (ref 1.6–2.6)
MCHC RBC-ENTMCNC: 30 PG — SIGNIFICANT CHANGE UP (ref 27–34)
MCHC RBC-ENTMCNC: 30.5 PG — SIGNIFICANT CHANGE UP (ref 27–34)
MCHC RBC-ENTMCNC: 31.4 GM/DL — LOW (ref 32–36)
MCHC RBC-ENTMCNC: 31.8 GM/DL — LOW (ref 32–36)
MCV RBC AUTO: 94.2 FL — SIGNIFICANT CHANGE UP (ref 80–100)
MCV RBC AUTO: 97 FL — SIGNIFICANT CHANGE UP (ref 80–100)
MONOCYTES # BLD AUTO: 1.21 K/UL — HIGH (ref 0–0.9)
MONOCYTES NFR BLD AUTO: 9.8 % — SIGNIFICANT CHANGE UP (ref 2–14)
NEUTROPHILS # BLD AUTO: 10.1 K/UL — HIGH (ref 1.8–7.4)
NEUTROPHILS NFR BLD AUTO: 82 % — HIGH (ref 43–77)
NRBC # BLD: 0 /100 WBCS — SIGNIFICANT CHANGE UP (ref 0–0)
PHOSPHATE SERPL-MCNC: 3.3 MG/DL — SIGNIFICANT CHANGE UP (ref 2.5–4.5)
PLATELET # BLD AUTO: 160 K/UL — SIGNIFICANT CHANGE UP (ref 150–400)
PLATELET # BLD AUTO: 166 K/UL — SIGNIFICANT CHANGE UP (ref 150–400)
POTASSIUM SERPL-MCNC: 4.2 MMOL/L — SIGNIFICANT CHANGE UP (ref 3.5–5.3)
POTASSIUM SERPL-SCNC: 4.2 MMOL/L — SIGNIFICANT CHANGE UP (ref 3.5–5.3)
PROT SERPL-MCNC: 5.7 G/DL — LOW (ref 6–8.3)
RBC # BLD: 3.27 M/UL — LOW (ref 4.2–5.8)
RBC # BLD: 3.28 M/UL — LOW (ref 4.2–5.8)
RBC # FLD: 13.3 % — SIGNIFICANT CHANGE UP (ref 10.3–14.5)
RBC # FLD: 13.5 % — SIGNIFICANT CHANGE UP (ref 10.3–14.5)
SODIUM SERPL-SCNC: 139 MMOL/L — SIGNIFICANT CHANGE UP (ref 135–145)
WBC # BLD: 12.32 K/UL — HIGH (ref 3.8–10.5)
WBC # BLD: 12.59 K/UL — HIGH (ref 3.8–10.5)
WBC # FLD AUTO: 12.32 K/UL — HIGH (ref 3.8–10.5)
WBC # FLD AUTO: 12.59 K/UL — HIGH (ref 3.8–10.5)

## 2019-10-23 PROCEDURE — 99232 SBSQ HOSP IP/OBS MODERATE 35: CPT | Mod: GC,24

## 2019-10-23 PROCEDURE — 99232 SBSQ HOSP IP/OBS MODERATE 35: CPT | Mod: GC

## 2019-10-23 RX ORDER — ACETAMINOPHEN 500 MG
650 TABLET ORAL EVERY 6 HOURS
Refills: 0 | Status: DISCONTINUED | OUTPATIENT
Start: 2019-10-23 | End: 2019-10-28

## 2019-10-23 RX ORDER — FUROSEMIDE 40 MG
80 TABLET ORAL ONCE
Refills: 0 | Status: COMPLETED | OUTPATIENT
Start: 2019-10-23 | End: 2019-10-23

## 2019-10-23 RX ORDER — INSULIN LISPRO 100/ML
VIAL (ML) SUBCUTANEOUS
Refills: 0 | Status: DISCONTINUED | OUTPATIENT
Start: 2019-10-23 | End: 2019-10-24

## 2019-10-23 RX ORDER — CHLORHEXIDINE GLUCONATE 213 G/1000ML
1 SOLUTION TOPICAL DAILY
Refills: 0 | Status: DISCONTINUED | OUTPATIENT
Start: 2019-10-23 | End: 2019-10-28

## 2019-10-23 RX ORDER — TRAMADOL HYDROCHLORIDE 50 MG/1
25 TABLET ORAL EVERY 4 HOURS
Refills: 0 | Status: DISCONTINUED | OUTPATIENT
Start: 2019-10-23 | End: 2019-10-23

## 2019-10-23 RX ORDER — INSULIN LISPRO 100/ML
VIAL (ML) SUBCUTANEOUS AT BEDTIME
Refills: 0 | Status: DISCONTINUED | OUTPATIENT
Start: 2019-10-23 | End: 2019-10-28

## 2019-10-23 RX ORDER — TRAMADOL HYDROCHLORIDE 50 MG/1
25 TABLET ORAL EVERY 6 HOURS
Refills: 0 | Status: DISCONTINUED | OUTPATIENT
Start: 2019-10-23 | End: 2019-10-28

## 2019-10-23 RX ADMIN — TRAMADOL HYDROCHLORIDE 25 MILLIGRAM(S): 50 TABLET ORAL at 23:25

## 2019-10-23 RX ADMIN — TRAMADOL HYDROCHLORIDE 25 MILLIGRAM(S): 50 TABLET ORAL at 15:33

## 2019-10-23 RX ADMIN — Medication 500000 UNIT(S): at 12:01

## 2019-10-23 RX ADMIN — Medication 80 MILLIGRAM(S): at 07:59

## 2019-10-23 RX ADMIN — Medication 650 MILLIGRAM(S): at 20:58

## 2019-10-23 RX ADMIN — Medication 500000 UNIT(S): at 05:33

## 2019-10-23 RX ADMIN — VALGANCICLOVIR 450 MILLIGRAM(S): 450 TABLET, FILM COATED ORAL at 12:01

## 2019-10-23 RX ADMIN — Medication 3: at 19:30

## 2019-10-23 RX ADMIN — Medication 500000 UNIT(S): at 17:37

## 2019-10-23 RX ADMIN — SODIUM CHLORIDE 70 MILLILITER(S): 9 INJECTION INTRAMUSCULAR; INTRAVENOUS; SUBCUTANEOUS at 12:00

## 2019-10-23 RX ADMIN — Medication 1 TABLET(S): at 12:01

## 2019-10-23 RX ADMIN — Medication 1: at 12:59

## 2019-10-23 RX ADMIN — Medication 125 MILLIGRAM(S): at 05:33

## 2019-10-23 RX ADMIN — TRAMADOL HYDROCHLORIDE 25 MILLIGRAM(S): 50 TABLET ORAL at 16:20

## 2019-10-23 RX ADMIN — MYCOPHENOLATE MOFETIL 1000 MILLIGRAM(S): 250 CAPSULE ORAL at 17:37

## 2019-10-23 RX ADMIN — FAMOTIDINE 20 MILLIGRAM(S): 10 INJECTION INTRAVENOUS at 12:01

## 2019-10-23 RX ADMIN — Medication 650 MILLIGRAM(S): at 19:58

## 2019-10-23 RX ADMIN — MYCOPHENOLATE MOFETIL 1000 MILLIGRAM(S): 250 CAPSULE ORAL at 05:33

## 2019-10-23 RX ADMIN — CHLORHEXIDINE GLUCONATE 1 APPLICATION(S): 213 SOLUTION TOPICAL at 12:59

## 2019-10-23 RX ADMIN — Medication 650 MILLIGRAM(S): at 13:56

## 2019-10-23 RX ADMIN — Medication 125 MILLIGRAM(S): at 17:37

## 2019-10-23 RX ADMIN — Medication 2: at 22:21

## 2019-10-23 RX ADMIN — HYDROMORPHONE HYDROCHLORIDE 30 MILLILITER(S): 2 INJECTION INTRAMUSCULAR; INTRAVENOUS; SUBCUTANEOUS at 07:29

## 2019-10-23 RX ADMIN — Medication 80 MILLIGRAM(S): at 18:13

## 2019-10-23 RX ADMIN — TACROLIMUS 4 MILLIGRAM(S): 5 CAPSULE ORAL at 05:33

## 2019-10-23 RX ADMIN — Medication 81 MILLIGRAM(S): at 12:01

## 2019-10-23 RX ADMIN — Medication 500000 UNIT(S): at 00:37

## 2019-10-23 RX ADMIN — Medication 650 MILLIGRAM(S): at 14:50

## 2019-10-23 RX ADMIN — TRAMADOL HYDROCHLORIDE 25 MILLIGRAM(S): 50 TABLET ORAL at 22:27

## 2019-10-23 NOTE — PROGRESS NOTE ADULT - SUBJECTIVE AND OBJECTIVE BOX
Transplant Surgery Progress Note    Present: Patient seen with multidisciplinary team including Transplant Surgeon:  Dr. Greenberg,. Transplant Nephrologist: Dr. Sinclair,  Pharmacists: Kvng Turner.  Nurse Practitioner: Quita Murphy, Surgery Resident Dr. Nelson in am rounds and examined with Dr. Greenberg   Disciplines not in attendance will be notified of the plan.     HPI:  39M with PMHx of IDDM () on insulin since , ESRD on HD M/W/F since  (last HD Monday 10/22) Via Left forearm AVF, HTN (), HLD, Retinopathy,  Cataract surgery and laser eye surgery s/p HCV+ DDRT with Simulect induction on 10/22. ureter stented.         donor renal transplant to right iliac vessels  Two arteries, anastomosed separately, single vein, Extramedullary renal pelvis with single ureter  Placement of double J ureteral stent    UNOS ID: [id number]  Match:  [id number] then local match [id number]  [facility]O: [facility] [place], [place]  ABO:  O  KDPI: 44%  Cold Ischemia time: 30 hrs  CMV +/EBV +      Interval Events:  - 6 hr post op labs significant for hyperkalemia to 7.3. Pt shifted and required HD overnight  - Renal US performed, which demonstrated slightly diminished cortical perfusion at the lower pole of the transplant, but normal blood flow demonstrated within both renal transplant arteries and the renal transplant vein. Normal intrarenal arterial resistive indices      MEDICATIONS  (STANDING):  aspirin enteric coated 81 milliGRAM(s) Oral daily  chlorhexidine 4% Liquid 1 Application(s) Topical <User Schedule>  dextrose 5%. 1000 milliLiter(s) (50 mL/Hr) IV Continuous <Continuous>  dextrose 50% Injectable 12.5 Gram(s) IV Push once  dextrose 50% Injectable 25 Gram(s) IV Push once  dextrose 50% Injectable 25 Gram(s) IV Push once  famotidine    Tablet 20 milliGRAM(s) Oral daily  insulin lispro (HumaLOG) corrective regimen sliding scale   SubCutaneous every 6 hours  methylPREDNISolone sodium succinate Injectable 125 milliGRAM(s) IV Push two times a day  mycophenolate mofetil 1000 milliGRAM(s) Oral two times a day  nystatin    Suspension 087477 Unit(s) Swish and Swallow four times a day  sodium chloride 0.45%. 500 milliLiter(s) (50 mL/Hr) IV Continuous <Continuous>  sodium chloride 0.9%. 1000 milliLiter(s) (70 mL/Hr) IV Continuous <Continuous>  tacrolimus ER Tablet (ENVARSUS XR) 4 milliGRAM(s) Oral daily  trimethoprim   80 mG/sulfamethoxazole 400 mG 1 Tablet(s) Oral daily  valGANciclovir 450 milliGRAM(s) Oral daily    MEDICATIONS  (PRN):  acetaminophen   Tablet .. 650 milliGRAM(s) Oral every 6 hours PRN Mild Pain (1 - 3), Moderate Pain (4 - 6)  dextrose 40% Gel 15 Gram(s) Oral once PRN Blood Glucose LESS THAN 70 milliGRAM(s)/deciliter  glucagon  Injectable 1 milliGRAM(s) IntraMuscular once PRN Glucose LESS THAN 70 milligrams/deciliter  naloxone Injectable 0.1 milliGRAM(s) IV Push every 3 minutes PRN For ANY of the following changes in patient status:  A. RR LESS THAN 10 breaths per minute, B. Oxygen saturation LESS THAN 90%, C. Sedation score of 6  ondansetron Injectable 4 milliGRAM(s) IV Push every 6 hours PRN Nausea and/or Vomiting  ondansetron Injectable 4 milliGRAM(s) IV Push every 6 hours PRN Nausea  traMADol 25 milliGRAM(s) Oral every 6 hours PRN Severe Pain (7 - 10)      Allergies    No Known Allergies    Intolerances    Review of Systems:  The patient denies fever, chills; chest pain, SOB, palpitation; dizziness, weakness; nausea, vomiting; diarrhea, constipation; leg swelling; sick contacts; and recent travel.     Vital Signs Last 24 Hrs  T(C): 36.9 (23 Oct 2019 08:00), Max: 37.5 (22 Oct 2019 17:00)  T(F): 98.4 (23 Oct 2019 08:00), Max: 99.5 (22 Oct 2019 17:00)  HR: 89 (23 Oct 2019 08:00) (83 - 108)  BP: 139/84 (23 Oct 2019 08:00) (96/60 - 178/93)  BP(mean): 95 (23 Oct 2019 04:00) (72 - 130)  RR: 16 (23 Oct 2019 08:00) (15 - 17)  SpO2: 94% (23 Oct 2019 08:00) (94% - 100%)    PHYSICAL EXAM:  Gen: WD, WN, NAD  HEENT: PERRLA, EOMI. Oropharynx clear.  Neck: Supple, no JVD/Bruit. No thyromegaly.  Lungs: CTA B/L.  Heart: RRR, S1 S2, No m/r/g  Abd: Soft, nondistended. Appropriately TTP. Incision dressed, c/d/i. RONALD in place, serosanguious, No rebound or guarding  Ext: WWP. No clubbing, cyanosis, or edema. Palpable pedal pulses.  Neuro: AAOx3. CN II-XII grossly intact, No focal deficits       LABS:                        10.0   12.32 )-----------( 166      ( 23 Oct 2019 08:37 )             31.8     10-23    139  |  94<L>  |  18  ----------------------------<  106<H>  4.2   |  25  |  4.95<H>    Ca    7.9<L>      23 Oct 2019 04:25  Phos  3.3     10-23  Mg     1.9     10-23    TPro  5.7<L>  /  Alb  3.4  /  TBili  0.3  /  DBili  x   /  AST  43<H>  /  ALT  12  /  AlkPhos  116  10-23    PT/INR - ( 22 Oct 2019 05:47 )   PT: 9.6 sec;   INR: 0.85 ratio         PTT - ( 22 Oct 2019 05:47 )  PTT:30.6 sec      RADIOLOGY & ADDITIONAL TESTS:    US Trans Kidney w/ Doppler, Right (10..19 @ 16:02)  FINDINGS:    Renal Transplant: 11.7 cm. A cyst with internal echoes measures 1.7 x 1.5   x 1.0 cm. Nohydronephrosis or calculi. No peritransplant fluid   collection.    Urinary bladder: Nondistended.    Color and spectral Doppler reveals slightly diminished perfusion to the   lower pole..    Peak iliac artery velocity is 209 cm/sec pre-anastomosis, 199 cm/sec at   the anastomosis, and 170 cm/sec post anastomosis.    Transplant Renal Artery:   Peak systolic velocity is 224 cm/sec anastomosis, 148 cm/sec proximal, 70   cm/sec mid, and 66 cm/sec hilum. The distal segment was not visualized.   The peak systolic velocity of the second renal artery ranges from 70-88   cm/sec in the distal to hilar regions.  Resistive Indices Range: 0.49 - 0.68.    Transplant Renal Vein: Patent..    IMPRESSION:     Slightly diminished cortical perfusion at the lower pole of the   transplant.    Normal blood flow demonstrated within both renal transplant arteries and   the renal transplant vein.    Intrarenal arterial resistive indices within normal limits.

## 2019-10-23 NOTE — PROGRESS NOTE ADULT - PROBLEM SELECTOR PLAN 3
Hyperkalemia 2/2 DGF post renal transplant, K 7.3 to 4.2 s/p HD 10/22    - daily assessment of dialysis need  - monitor K   - low K diet, nepro shakes

## 2019-10-23 NOTE — PROGRESS NOTE ADULT - PROBLEM SELECTOR PLAN 1
s/p HCV+ DDRT, simulect induction POD# 0 and 4.  s/p HD 10/22 hyperK 7.3    - will reassess need for dialysis including K and volume status  - c/w immunosuppression: Envarsus 4, Cellcept 1000 BID, Methyprednisolone taper  - c/w ppx: bactrim/valcyte/nystatin/pepcid  - check daily Tacrolimus trough 30 minutes before morning dose (goal 8-10)  - restart Aspirin 81  - trend SCr, strict I/O  - renally dose medications

## 2019-10-23 NOTE — PROGRESS NOTE ADULT - ASSESSMENT
39M PMHx ESRD 2/2 DM on HD (last dialyzed 10/22 via LUE AVF), IDDM2 (dx 2000) c/b retinopathy, HTN (2013), HLD admitted for HCV+ DDRT with Simulect induction.      Recipient   ABO O type, CPRA: 0%   CMV/EBV Status: CMV +, EBV +   Last HD: Monday 10/21/19     Donor (import?):  Donor ID: [id number]   Match:  [id number] then local match [id number]   OPO: BING – AMRIK Otto   Age: 41   ABO:  O   KDPI: 44%   COD:  Anoxia/Seizure   X Clamp Time  - 10/21/2019 – 04:53   Medical Hx:  ex- IVDU on methadone, kidney stones, UTI, ureter stent, HTN during pregnancy, smoker, anti - HCV + , HCV ARCADIO + , HBsAb +   Terminal Creatinine – 0.5   CIT: 30 hours  CMV +/EBV +   Kidney ETA to [facility]: 10/22/19 at 0600   Kidney Laterality   -Left

## 2019-10-23 NOTE — PROGRESS NOTE ADULT - ASSESSMENT
39M with PMHx of IDDM (2000) on insulin since 2011, ESRD on HD M/W/F since 2013 (last HD Monday 10/22) Via Left forearm AVF, HTN (2013), HLD, Retinopathy,  Cataract surgery and laser eye surgery s/p HCV+ DDRT with Simulect induction on 10/22.     [] s/p DDRT 10/22/19,  requiring HD  - Assess for need for HD daily  - Envarsus 4mg this AM - follow up Tacro level  - Ppx: Nystatin, Bactrim, Valcyte  - Steroid taper  - Renal US showing decreased flow in lower pole of kidney - continue ASA, SBP goal 130 - 160  - Advance to regular diet today  - Continue hightower for strict I/Os  - Simulect on POD#4    [] DM  - Sliding scale insulin 39M with PMHx of IDDM (2000) on insulin since 2011, ESRD on HD M/W/F since 2013 (last HD Monday 10/22) Via Left forearm AVF, HTN (2013), HLD, Retinopathy,  Cataract surgery and laser eye surgery s/p HCV+ DDRT with Simulect induction on 10/22.     [] s/p DDRT 10/22/19,  requiring HD  - Assess for need for HD daily  - Immuno: Envarsus 4mg this AM - follow up [place] level, MMF 1000mg BID, Steroid taper  - Ppx: Nystatin, Bactrim, Valcyte  - Renal US showing decreased flow in lower pole of kidney - continue ASA, SBP goal 130 - 160  - Advance to regular diet today  - Continue hightower for strict I/Os  - Simulect on POD#4    [] DM  - Sliding scale insulin

## 2019-10-23 NOTE — PROGRESS NOTE ADULT - SUBJECTIVE AND OBJECTIVE BOX
Day 1 of Anesthesia Pain Management Service    SUBJECTIVE: Doing well    Pain Scale Score:	[X] Refer to charted pain scores    THERAPY:    [ ] IV PCA Morphine		        [ ] 5 mg/mL	[ ] 1 mg/mL  [X] IV PCA Hydromorphone	[ ] 5 mg/mL	[X] 1 mg/mL  [ ] IV PCA Fentanyl		        [ ] 50 micrograms/mL    Demand dose: 0.2 mg     Lockout: 6 minutes   Continuous Rate: 0 mg/hr  4 Hour Limit: 4 mg    MEDICATIONS  (STANDING):  aspirin enteric coated 81 milliGRAM(s) Oral daily  chlorhexidine 4% Liquid 1 Application(s) Topical <User Schedule>  dextrose 5%. 1000 milliLiter(s) (50 mL/Hr) IV Continuous <Continuous>  dextrose 50% Injectable 12.5 Gram(s) IV Push once  dextrose 50% Injectable 25 Gram(s) IV Push once  dextrose 50% Injectable 25 Gram(s) IV Push once  famotidine    Tablet 20 milliGRAM(s) Oral daily  insulin lispro (HumaLOG) corrective regimen sliding scale   SubCutaneous every 6 hours  methylPREDNISolone sodium succinate Injectable 125 milliGRAM(s) IV Push two times a day  mycophenolate mofetil 1000 milliGRAM(s) Oral two times a day  nystatin    Suspension [id number] Unit(s) Swish and Swallow four times a day  sodium chloride 0.45%. 500 milliLiter(s) (50 mL/Hr) IV Continuous <Continuous>  sodium chloride 0.9%. 1000 milliLiter(s) (70 mL/Hr) IV Continuous <Continuous>  tacrolimus ER Tablet (ENVARSUS XR) 4 milliGRAM(s) Oral daily  trimethoprim   80 mG/sulfamethoxazole 400 mG 1 Tablet(s) Oral daily  valGANciclovir 450 milliGRAM(s) Oral daily    MEDICATIONS  (PRN):  acetaminophen   Tablet .. 650 milliGRAM(s) Oral every 6 hours PRN Mild Pain (1 - 3), Moderate Pain (4 - 6)  dextrose 40% Gel 15 Gram(s) Oral once PRN Blood Glucose LESS THAN 70 milliGRAM(s)/deciliter  glucagon  Injectable 1 milliGRAM(s) IntraMuscular once PRN Glucose LESS THAN 70 milligrams/deciliter  naloxone Injectable 0.1 milliGRAM(s) IV Push every 3 minutes PRN For ANY of the following changes in patient status:  A. RR LESS THAN 10 breaths per minute, B. Oxygen saturation LESS THAN 90%, C. Sedation score of 6  ondansetron Injectable 4 milliGRAM(s) IV Push every 6 hours PRN Nausea and/or Vomiting  ondansetron Injectable 4 milliGRAM(s) IV Push every 6 hours PRN Nausea  traMADol 25 milliGRAM(s) Oral every 6 hours PRN Severe Pain (7 - 10)      OBJECTIVE:    Sedation Score:	[ X] Alert	 [ ] Drowsy 	[ ] Arousable	[ ] Asleep	[ ] Unresponsive    Side Effects:	[X ] None	[ ] Nausea	[ ] Vomiting	[ ] Pruritus  		[ ] Other:    Vital Signs Last 24 Hrs  T(C): 36.9 (23 Oct 2019 08:00), Max: 37.5 (22 Oct 2019 17:00)  T(F): 98.4 (23 Oct 2019 08:00), Max: 99.5 (22 Oct 2019 17:00)  HR: 89 (23 Oct 2019 08:00) (83 - 108)  BP: 139/84 (23 Oct 2019 08:00) (96/60 - 178/93)  BP(mean): 95 (23 Oct 2019 04:00) (72 - 130)  RR: 16 (23 Oct 2019 08:00) (15 - 17)  SpO2: 94% (23 Oct 2019 08:00) (94% - 100%)    ASSESSMENT/ PLAN    Therapy to  be:               [  ] Continued   [X ] Discontinued   [ X] Changed to PRN Analgesics    Documentation and Verification of current medications:   [X] Done	[ ] Not done, not eligible    Comments: Pain controlled with PCA. D\C'd by surgical service.

## 2019-10-23 NOTE — PROGRESS NOTE ADULT - SUBJECTIVE AND OBJECTIVE BOX
[facility] DIVISION OF KIDNEY DISEASES AND HYPERTENSION -- FOLLOW UP NOTE  --------------------------------------------------------------------------------  Chief Complaint:    24 hour events/subjective:  - overnight had hemodialysis 3.5hr for hyperkalemia 7.3, repeat K 4.2.  vitals wnl, uop hightower 306 cc/drain 190 cc, HD 1.1L removed  - pt seen and examined this morning, report mild abdominal pain from surgical area, passing gas however no bowel movement      PAST HISTORY  --------------------------------------------------------------------------------  No significant changes to PMH, PSH, FHx, SHx, unless otherwise noted    ALLERGIES & MEDICATIONS  --------------------------------------------------------------------------------  Allergies    No Known Allergies    Intolerances      Standing Inpatient Medications  aspirin enteric coated 81 milliGRAM(s) Oral daily  chlorhexidine 4% Liquid 1 Application(s) Topical <User Schedule>  dextrose 5%. 1000 milliLiter(s) IV Continuous <Continuous>  dextrose 50% Injectable 12.5 Gram(s) IV Push once  dextrose 50% Injectable 25 Gram(s) IV Push once  dextrose 50% Injectable 25 Gram(s) IV Push once  famotidine    Tablet 20 milliGRAM(s) Oral daily  insulin lispro (HumaLOG) corrective regimen sliding scale   SubCutaneous every 6 hours  methylPREDNISolone sodium succinate Injectable 125 milliGRAM(s) IV Push two times a day  mycophenolate mofetil 1000 milliGRAM(s) Oral two times a day  nystatin    Suspension [id number] Unit(s) Swish and Swallow four times a day  sodium chloride 0.45%. 500 milliLiter(s) IV Continuous <Continuous>  sodium chloride 0.9%. 1000 milliLiter(s) IV Continuous <Continuous>  tacrolimus ER Tablet (ENVARSUS XR) 4 milliGRAM(s) Oral daily  trimethoprim   80 mG/sulfamethoxazole 400 mG 1 Tablet(s) Oral daily  valGANciclovir 450 milliGRAM(s) Oral daily    PRN Inpatient Medications  acetaminophen   Tablet .. 650 milliGRAM(s) Oral every 6 hours PRN  dextrose 40% Gel 15 Gram(s) Oral once PRN  glucagon  Injectable 1 milliGRAM(s) IntraMuscular once PRN  naloxone Injectable 0.1 milliGRAM(s) IV Push every 3 minutes PRN  ondansetron Injectable 4 milliGRAM(s) IV Push every 6 hours PRN  ondansetron Injectable 4 milliGRAM(s) IV Push every 6 hours PRN  traMADol 25 milliGRAM(s) Oral every 6 hours PRN      REVIEW OF SYSTEMS  --------------------------------------------------------------------------------  Gen: No fatigue, fevers/chills, weakness  Respiratory: No dyspnea, cough, wheezing  CV: No chest pain, PND, orthopnea  GI: mild abdominal pain from surgical incision, pass gas.  No bowel movement, nausea, vomiting, melena  : No dysuria, hematuria  MSK: no edema    All other systems were reviewed and are negative, except as noted.    VITALS/PHYSICAL EXAM  --------------------------------------------------------------------------------  T(C): 36.9 (10-23-19 @ 08:00), Max: 37.5 (10-22-19 @ 17:00)  HR: 89 (10-23-19 @ 08:00) (83 - 108)  BP: 139/84 (10-23-19 @ 08:00) (96/60 - 178/93)  RR: 16 (10-23-19 @ 08:00) (15 - 17)  SpO2: 94% (10-23-19 @ 08:00) (94% - 100%)  Wt(kg): --  Height (cm): 160.02 (10-22-19 @ 07:18)  Weight (kg): 57.2 (10-22-19 @ 07:18)  BMI (kg/m2): 22.3 (10-22-19 @ 07:18)  BSA (m2): 1.59 (10-22-19 @ 07:18)      10-22-19 @ 07:01  -  10-23-19 @ 07:00  --------------------------------------------------------  IN: 1190 mL / OUT: 496 mL / NET: 694 mL    10-23-19 @ 07:01  -  10-23-19 @ 11:20  --------------------------------------------------------  IN: 390 mL / OUT: 45 mL / NET: 345 mL      Physical Exam:  	Gen: NAD, well-appearing on room air  	Pulm: CTA B/L, no crackles/wheezing  	CV: RRR, S1S2; no systolic  	Abd: +BS, soft, nontender/nondistended, incision c/d/i, RONALD drain  	: campbell  	LE: Warm, no edema, LUE AVF+thrills  	Psych: Normal affect and mood  	Skin: Warm, without rashes  	Vascular access:    LABS/STUDIES  --------------------------------------------------------------------------------              10.0   12.32 >-----------<  166      [10-23-19 @ 08:37]              31.8     139  |  94  |  18  ----------------------------<  106      [10-23-19 @ 04:25]  4.2   |  25  |  4.95        Ca     7.9     [10-23-19 @ 04:25]      Mg     1.9     [10-23-19 @ 04:25]      Phos  3.3     [10-23-19 @ 04:25]    TPro  5.7  /  Alb  3.4  /  TBili  0.3  /  DBili  x   /  AST  43  /  ALT  12  /  AlkPhos  116  [10-23-19 @ 04:25]    PT/INR: PT 9.6  , INR 0.85       [10-22-19 @ 05:47]  PTT: 30.6       [10-22-19 @ 05:47]      Creatinine Trend:  SCr 4.95 [10-23 @ 04:25]  SCr 9.78 [10-22 @ 19:41]  SCr 8.94 [10-22 @ 14:46]  SCr 9.46 [10-22 @ 05:48]        HbA1c 6.5      [10-23-19 @ 08:37]    HBsAg Nonreact      [10-22-19 @ 09:21]  HCV 0.10, Nonreact      [10-22-19 @ 09:21]

## 2019-10-24 LAB
ALBUMIN SERPL ELPH-MCNC: 3 G/DL — LOW (ref 3.3–5)
ALP SERPL-CCNC: 103 U/L — SIGNIFICANT CHANGE UP (ref 40–120)
ALT FLD-CCNC: 6 U/L — LOW (ref 10–45)
ANION GAP SERPL CALC-SCNC: 16 MMOL/L — SIGNIFICANT CHANGE UP (ref 5–17)
ANION GAP SERPL CALC-SCNC: 19 MMOL/L — HIGH (ref 5–17)
AST SERPL-CCNC: 29 U/L — SIGNIFICANT CHANGE UP (ref 10–40)
BILIRUB SERPL-MCNC: 0.2 MG/DL — SIGNIFICANT CHANGE UP (ref 0.2–1.2)
BUN SERPL-MCNC: 45 MG/DL — HIGH (ref 7–23)
BUN SERPL-MCNC: 52 MG/DL — HIGH (ref 7–23)
CALCIUM SERPL-MCNC: 7.6 MG/DL — LOW (ref 8.4–10.5)
CALCIUM SERPL-MCNC: 7.9 MG/DL — LOW (ref 8.4–10.5)
CHLORIDE SERPL-SCNC: 91 MMOL/L — LOW (ref 96–108)
CHLORIDE SERPL-SCNC: 93 MMOL/L — LOW (ref 96–108)
CO2 SERPL-SCNC: 22 MMOL/L — SIGNIFICANT CHANGE UP (ref 22–31)
CO2 SERPL-SCNC: 23 MMOL/L — SIGNIFICANT CHANGE UP (ref 22–31)
CREAT SERPL-MCNC: 7.99 MG/DL — HIGH (ref 0.5–1.3)
CREAT SERPL-MCNC: 8.32 MG/DL — HIGH (ref 0.5–1.3)
GLUCOSE BLDC GLUCOMTR-MCNC: 244 MG/DL — HIGH (ref 70–99)
GLUCOSE BLDC GLUCOMTR-MCNC: 280 MG/DL — HIGH (ref 70–99)
GLUCOSE BLDC GLUCOMTR-MCNC: 316 MG/DL — HIGH (ref 70–99)
GLUCOSE BLDC GLUCOMTR-MCNC: 327 MG/DL — HIGH (ref 70–99)
GLUCOSE BLDC GLUCOMTR-MCNC: 350 MG/DL — HIGH (ref 70–99)
GLUCOSE BLDC GLUCOMTR-MCNC: 89 MG/DL — SIGNIFICANT CHANGE UP (ref 70–99)
GLUCOSE SERPL-MCNC: 290 MG/DL — HIGH (ref 70–99)
GLUCOSE SERPL-MCNC: 340 MG/DL — HIGH (ref 70–99)
HCT VFR BLD CALC: 26.1 % — LOW (ref 39–50)
HGB BLD-MCNC: 8.3 G/DL — LOW (ref 13–17)
MAGNESIUM SERPL-MCNC: 2.2 MG/DL — SIGNIFICANT CHANGE UP (ref 1.6–2.6)
MCHC RBC-ENTMCNC: 30.3 PG — SIGNIFICANT CHANGE UP (ref 27–34)
MCHC RBC-ENTMCNC: 31.8 GM/DL — LOW (ref 32–36)
MCV RBC AUTO: 95.3 FL — SIGNIFICANT CHANGE UP (ref 80–100)
NRBC # BLD: 0 /100 WBCS — SIGNIFICANT CHANGE UP (ref 0–0)
PHOSPHATE SERPL-MCNC: 6.4 MG/DL — HIGH (ref 2.5–4.5)
PLATELET # BLD AUTO: 143 K/UL — LOW (ref 150–400)
POTASSIUM SERPL-MCNC: 5.2 MMOL/L — SIGNIFICANT CHANGE UP (ref 3.5–5.3)
POTASSIUM SERPL-MCNC: 5.5 MMOL/L — HIGH (ref 3.5–5.3)
POTASSIUM SERPL-SCNC: 5.2 MMOL/L — SIGNIFICANT CHANGE UP (ref 3.5–5.3)
POTASSIUM SERPL-SCNC: 5.5 MMOL/L — HIGH (ref 3.5–5.3)
PROT SERPL-MCNC: 5.4 G/DL — LOW (ref 6–8.3)
RBC # BLD: 2.74 M/UL — LOW (ref 4.2–5.8)
RBC # FLD: 13.7 % — SIGNIFICANT CHANGE UP (ref 10.3–14.5)
SODIUM SERPL-SCNC: 132 MMOL/L — LOW (ref 135–145)
SODIUM SERPL-SCNC: 132 MMOL/L — LOW (ref 135–145)
TACROLIMUS SERPL-MCNC: <2 NG/ML — SIGNIFICANT CHANGE UP
WBC # BLD: 13.63 K/UL — HIGH (ref 3.8–10.5)
WBC # FLD AUTO: 13.63 K/UL — HIGH (ref 3.8–10.5)

## 2019-10-24 PROCEDURE — 99232 SBSQ HOSP IP/OBS MODERATE 35: CPT | Mod: GC,24

## 2019-10-24 PROCEDURE — 76776 US EXAM K TRANSPL W/DOPPLER: CPT | Mod: 26,RT

## 2019-10-24 PROCEDURE — 99232 SBSQ HOSP IP/OBS MODERATE 35: CPT | Mod: GC

## 2019-10-24 RX ORDER — DOCUSATE SODIUM 100 MG/1
100 CAPSULE ORAL
Refills: 0 | Status: DISCONTINUED | COMMUNITY
End: 2019-10-24

## 2019-10-24 RX ORDER — SUCROFERRIC OXYHYDROXIDE 500 MG/1
500 TABLET, CHEWABLE ORAL
Refills: 0 | Status: DISCONTINUED | COMMUNITY
End: 2019-10-24

## 2019-10-24 RX ORDER — ASCORBIC ACID, THIAMINE, RIBOFLAVIN, NIACINAMIDE, PYRIDOXINE, FOLIC ACID, COBALAMIN, BIOTIN, PANTOTHENIC ACID 100; 1.5; 1.7; 20; 10; 1; 6; 300; 1 MG/1; MG/1; MG/1; MG/1; MG/1; MG/1; UG/1; UG/1; MG/1
TABLET, COATED ORAL
Refills: 0 | Status: DISCONTINUED | COMMUNITY
End: 2019-10-24

## 2019-10-24 RX ORDER — TACROLIMUS 5 MG/1
6 CAPSULE ORAL DAILY
Refills: 0 | Status: DISCONTINUED | OUTPATIENT
Start: 2019-10-25 | End: 2019-10-25

## 2019-10-24 RX ORDER — INSULIN LISPRO 100/ML
VIAL (ML) SUBCUTANEOUS
Refills: 0 | Status: DISCONTINUED | OUTPATIENT
Start: 2019-10-24 | End: 2019-10-28

## 2019-10-24 RX ORDER — INSULIN LISPRO 100/ML
4 VIAL (ML) SUBCUTANEOUS
Refills: 0 | Status: DISCONTINUED | OUTPATIENT
Start: 2019-10-24 | End: 2019-10-24

## 2019-10-24 RX ORDER — CINACALCET HYDROCHLORIDE 30 MG/1
30 TABLET, COATED ORAL DAILY
Refills: 0 | Status: DISCONTINUED | COMMUNITY
End: 2019-10-24

## 2019-10-24 RX ORDER — TACROLIMUS 5 MG/1
2 CAPSULE ORAL ONCE
Refills: 0 | Status: COMPLETED | OUTPATIENT
Start: 2019-10-24 | End: 2019-10-24

## 2019-10-24 RX ORDER — FUROSEMIDE 40 MG
80 TABLET ORAL
Refills: 0 | Status: DISCONTINUED | OUTPATIENT
Start: 2019-10-24 | End: 2019-10-25

## 2019-10-24 RX ORDER — INSULIN LISPRO 100/ML
6 VIAL (ML) SUBCUTANEOUS
Refills: 0 | Status: DISCONTINUED | OUTPATIENT
Start: 2019-10-24 | End: 2019-10-26

## 2019-10-24 RX ORDER — SEVELAMER CARBONATE 800 MG/1
800 TABLET, FILM COATED ORAL
Refills: 0 | Status: DISCONTINUED | COMMUNITY
End: 2019-10-24

## 2019-10-24 RX ORDER — INSULIN GLARGINE 100 [IU]/ML
10 INJECTION, SOLUTION SUBCUTANEOUS AT BEDTIME
Refills: 0 | Status: DISCONTINUED | OUTPATIENT
Start: 2019-10-24 | End: 2019-10-26

## 2019-10-24 RX ADMIN — TRAMADOL HYDROCHLORIDE 25 MILLIGRAM(S): 50 TABLET ORAL at 22:28

## 2019-10-24 RX ADMIN — MYCOPHENOLATE MOFETIL 1000 MILLIGRAM(S): 250 CAPSULE ORAL at 06:34

## 2019-10-24 RX ADMIN — Medication 6: at 17:33

## 2019-10-24 RX ADMIN — Medication 1 TABLET(S): at 13:31

## 2019-10-24 RX ADMIN — Medication 500000 UNIT(S): at 23:24

## 2019-10-24 RX ADMIN — Medication 500000 UNIT(S): at 17:34

## 2019-10-24 RX ADMIN — Medication 81 MILLIGRAM(S): at 13:31

## 2019-10-24 RX ADMIN — VALGANCICLOVIR 450 MILLIGRAM(S): 450 TABLET, FILM COATED ORAL at 13:31

## 2019-10-24 RX ADMIN — Medication 4 UNIT(S): at 09:03

## 2019-10-24 RX ADMIN — Medication 80 MILLIGRAM(S): at 22:05

## 2019-10-24 RX ADMIN — Medication 500000 UNIT(S): at 01:23

## 2019-10-24 RX ADMIN — Medication 6 UNIT(S): at 17:33

## 2019-10-24 RX ADMIN — Medication 2: at 09:04

## 2019-10-24 RX ADMIN — FAMOTIDINE 20 MILLIGRAM(S): 10 INJECTION INTRAVENOUS at 13:31

## 2019-10-24 RX ADMIN — Medication 4 UNIT(S): at 14:27

## 2019-10-24 RX ADMIN — CHLORHEXIDINE GLUCONATE 1 APPLICATION(S): 213 SOLUTION TOPICAL at 14:02

## 2019-10-24 RX ADMIN — Medication 4: at 14:27

## 2019-10-24 RX ADMIN — Medication 60 MILLIGRAM(S): at 17:34

## 2019-10-24 RX ADMIN — MYCOPHENOLATE MOFETIL 1000 MILLIGRAM(S): 250 CAPSULE ORAL at 17:34

## 2019-10-24 RX ADMIN — SODIUM CHLORIDE 70 MILLILITER(S): 9 INJECTION INTRAMUSCULAR; INTRAVENOUS; SUBCUTANEOUS at 06:40

## 2019-10-24 RX ADMIN — TRAMADOL HYDROCHLORIDE 25 MILLIGRAM(S): 50 TABLET ORAL at 23:30

## 2019-10-24 RX ADMIN — INSULIN GLARGINE 10 UNIT(S): 100 INJECTION, SOLUTION SUBCUTANEOUS at 22:05

## 2019-10-24 RX ADMIN — Medication 80 MILLIGRAM(S): at 10:01

## 2019-10-24 RX ADMIN — Medication 60 MILLIGRAM(S): at 06:34

## 2019-10-24 RX ADMIN — Medication 500000 UNIT(S): at 13:31

## 2019-10-24 RX ADMIN — TRAMADOL HYDROCHLORIDE 25 MILLIGRAM(S): 50 TABLET ORAL at 13:33

## 2019-10-24 RX ADMIN — Medication 500000 UNIT(S): at 06:34

## 2019-10-24 RX ADMIN — TACROLIMUS 2 MILLIGRAM(S): 5 CAPSULE ORAL at 16:00

## 2019-10-24 RX ADMIN — TACROLIMUS 4 MILLIGRAM(S): 5 CAPSULE ORAL at 06:33

## 2019-10-24 RX ADMIN — TRAMADOL HYDROCHLORIDE 25 MILLIGRAM(S): 50 TABLET ORAL at 14:33

## 2019-10-24 NOTE — PROGRESS NOTE ADULT - ASSESSMENT
39M with PMHx of IDDM (2000) on insulin since 2011, ESRD on HD M/W/F since 2013 (last HD Monday 10/22) Via Left forearm AVF, HTN (2013), HLD, Retinopathy,  Cataract surgery and laser eye surgery s/p HCV+ DDRT with Simulect induction on 10/22.     [] s/p DDRT 10/22/19,  requiring HD  - Assess for need for HD daily  - Immuno: Received Envarus 4mg this AM, follow up [place] level, MMF 1000mg BID, Steroid taper  - Ppx: Nystatin, Bactrim, Valcyte  - Renal US showing decreased flow in lower pole of kidney - continue ASA, SBP goal 130 - 160  - Will obtain repeat Renal US today  - Continue regular diet, change to carb consistent  - Continue hightower for strict I/Os  - Pepcid for GI ppx  - Simulect on POD#4      [] DM  - Sliding scale insulin  - Restart home Insulin regimen

## 2019-10-24 NOTE — PROGRESS NOTE ADULT - SUBJECTIVE AND OBJECTIVE BOX
Transplant Surgery Progress Note    Present: Patient seen with multidisciplinary team including Transplant Surgeon:  Dr. Gabriel,. Transplant Nephrologist: Dr. Sinclair, Nephrology fellow Dr. Ramsey, Pharmacists: Kvng Turner.  Nurse Practitioner: Modesto Murphy, Surgery Resident Dr. Nelson in am rounds and examined with Dr. Gabriel   Disciplines not in attendance will be notified of the plan.     HPI:  39M with PMHx of IDDM () on insulin since , ESRD on HD M/W/F since  (last HD Monday 10/22) Via Left forearm AVF, HTN (), HLD, Retinopathy,  Cataract surgery and laser eye surgery s/p HCV+ DDRT with Simulect induction on 10/22. ureter stented.         donor renal transplant to right iliac vessels  Two arteries, anastomosed separately, single vein, Extramedullary renal pelvis with single ureter  Placement of double J ureteral stent    UNOS ID: [id number]  Match:  [id number] then local match [id number]  OPO: [facility]ING – [place]  ABO:  O  KDPI: 44%  Cold Ischemia time: 30 hrs  CMV +/EBV +      Interval Events:  - Received 80mg IV Lasix x 2 yesterday  - Advanced to regular diet  - Transitioned from PCA to PO analgesics       MEDICATIONS  (STANDING):  aspirin enteric coated 81 milliGRAM(s) Oral daily  chlorhexidine 2% Cloths 1 Application(s) Topical daily  chlorhexidine 4% Liquid 1 Application(s) Topical <User Schedule>  dextrose 5%. 1000 milliLiter(s) (50 mL/Hr) IV Continuous <Continuous>  dextrose 50% Injectable 12.5 Gram(s) IV Push once  dextrose 50% Injectable 25 Gram(s) IV Push once  dextrose 50% Injectable 25 Gram(s) IV Push once  famotidine    Tablet 20 milliGRAM(s) Oral daily  furosemide   Injectable 80 milliGRAM(s) IV Push two times a day  insulin glargine Injectable (LANTUS) 10 Unit(s) SubCutaneous at bedtime  insulin lispro (HumaLOG) corrective regimen sliding scale   SubCutaneous three times a day before meals  insulin lispro (HumaLOG) corrective regimen sliding scale   SubCutaneous at bedtime  insulin lispro Injectable (HumaLOG) 4 Unit(s) SubCutaneous three times a day before meals  methylPREDNISolone sodium succinate Injectable 60 milliGRAM(s) IV Push two times a day  mycophenolate mofetil 1000 milliGRAM(s) Oral two times a day  nystatin    Suspension [id number] Unit(s) Swish and Swallow four times a day  tacrolimus ER Tablet (ENVARSUS XR) 4 milliGRAM(s) Oral daily  trimethoprim   80 mG/sulfamethoxazole 400 mG 1 Tablet(s) Oral daily  valGANciclovir 450 milliGRAM(s) Oral daily    MEDICATIONS  (PRN):  acetaminophen   Tablet .. 650 milliGRAM(s) Oral every 6 hours PRN Mild Pain (1 - 3), Moderate Pain (4 - 6)  dextrose 40% Gel 15 Gram(s) Oral once PRN Blood Glucose LESS THAN 70 milliGRAM(s)/deciliter  glucagon  Injectable 1 milliGRAM(s) IntraMuscular once PRN Glucose LESS THAN 70 milligrams/deciliter  naloxone Injectable 0.1 milliGRAM(s) IV Push every 3 minutes PRN For ANY of the following changes in patient status:  A. RR LESS THAN 10 breaths per minute, B. Oxygen saturation LESS THAN 90%, C. Sedation score of 6  ondansetron Injectable 4 milliGRAM(s) IV Push every 6 hours PRN Nausea and/or Vomiting  ondansetron Injectable 4 milliGRAM(s) IV Push every 6 hours PRN Nausea  traMADol 25 milliGRAM(s) Oral every 6 hours PRN Severe Pain (7 - 10)        Allergies    No Known Allergies    Intolerances    Review of Systems:  The patient denies fever, chills; chest pain, SOB, palpitation; dizziness, weakness; nausea, vomiting; diarrhea, constipation; leg swelling; sick contacts; and recent travel.     Vital Signs Last 24 Hrs  T(C): 36.9 (23 Oct 2019 08:00), Max: 37.5 (22 Oct 2019 17:00)  T(F): 98.4 (23 Oct 2019 08:00), Max: 99.5 (22 Oct 2019 17:00)  HR: 89 (23 Oct 2019 08:00) (83 - 108)  BP: 139/84 (23 Oct 2019 08:00) (96/60 - 178/93)  BP(mean): 95 (23 Oct 2019 04:00) (72 - 130)  RR: 16 (23 Oct 2019 08:00) (15 - 17)  SpO2: 94% (23 Oct 2019 08:00) (94% - 100%)    PHYSICAL EXAM:  Gen: WD, WN, NAD  HEENT: PERRLA, EOMI. Oropharynx clear.  Neck: Supple, no JVD/Bruit. No thyromegaly.  Lungs: CTA B/L.  Heart: RRR, S1 S2, No m/r/g  Abd: Soft, nondistended. Appropriately TTP. Incision dressed, c/d/i. RONALD in place, serosanguious, No rebound or guarding  Ext: WWP. No clubbing, cyanosis, or edema. Palpable pedal pulses.  Neuro: AAOx3. CN II-XII grossly intact, No focal deficits       LABS:                        10.0   12.32 )-----------( 166      ( 23 Oct 2019 08:37 )             31.8     10-23    139  |  94<L>  |  18  ----------------------------<  106<H>  4.2   |  25  |  4.95<H>    Ca    7.9<L>      23 Oct 2019 04:25  Phos  3.3     10-23  Mg     1.9     10-23    TPro  5.7<L>  /  Alb  3.4  /  TBili  0.3  /  DBili  x   /  AST  43<H>  /  ALT  12  /  AlkPhos  116  10-23    PT/INR - ( 22 Oct 2019 05:47 )   PT: 9.6 sec;   INR: 0.85 ratio         PTT - ( 22 Oct 2019 05:47 )  PTT:30.6 sec      RADIOLOGY & ADDITIONAL TESTS:    US Trans Kidney w/ Doppler, Right (10.22.19 @ 16:02)  FINDINGS:    Renal Transplant: 11.7 cm. A cyst with internal echoes measures 1.7 x 1.5   x 1.0 cm. Nohydronephrosis or calculi. No peritransplant fluid   collection.    Urinary bladder: Nondistended.    Color and spectral Doppler reveals slightly diminished perfusion to the   lower pole..    Peak iliac artery velocity is 209 cm/sec pre-anastomosis, 199 cm/sec at   the anastomosis, and 170 cm/sec post anastomosis.    Transplant Renal Artery:   Peak systolic velocity is 224 cm/sec anastomosis, 148 cm/sec proximal, 70   cm/sec mid, and 66 cm/sec hilum. The distal segment was not visualized.   The peak systolic velocity of the second renal artery ranges from 70-88   cm/sec in the distal to hilar regions.  Resistive Indices Range: 0.49 - 0.68.    Transplant Renal Vein: Patent..    IMPRESSION:     Slightly diminished cortical perfusion at the lower pole of the   transplant.    Normal blood flow demonstrated within both renal transplant arteries and   the renal transplant vein.    Intrarenal arterial resistive indices within normal limits. Transplant Surgery Progress Note    Present: Patient seen with multidisciplinary team including Transplant Surgeon:  Dr. Gabriel,. Transplant Nephrologist: Dr. Sinclair, Nephrology fellow Dr. Ramsey, Pharmacists: Kvng Turner.  Nurse Practitioner: Modesto Murphy, Surgery Resident Dr. Nelson in am rounds and examined with Dr. Gabriel   Disciplines not in attendance will be notified of the plan.     HPI:  39M with PMHx of IDDM () on insulin since , ESRD on HD M/W/F since  (last HD Monday 10/22) Via Left forearm AVF, HTN (), HLD, Retinopathy,  Cataract surgery and laser eye surgery s/p HCV+ DDRT with Simulect induction on 10/22. ureter stented.         donor renal transplant to right iliac vessels  Two arteries, anastomosed separately, single vein, Extramedullary renal pelvis with single ureter  Placement of double J ureteral stent    UNOS ID: [id number]  Match:  [id number] then local match [id number]  OPO: BING – [place]  ABO:  O  KDPI: 44%  Cold Ischemia time: 30 hrs  CMV +/EBV +      Interval Events:  - Received 80mg IV Lasix x 2 yesterday  - Advanced to regular diet  - Transitioned from PCA to PO analgesics       MEDICATIONS  (STANDING):  aspirin enteric coated 81 milliGRAM(s) Oral daily  chlorhexidine 2% Cloths 1 Application(s) Topical daily  chlorhexidine 4% Liquid 1 Application(s) Topical <User Schedule>  dextrose 5%. 1000 milliLiter(s) (50 mL/Hr) IV Continuous <Continuous>  dextrose 50% Injectable 12.5 Gram(s) IV Push once  dextrose 50% Injectable 25 Gram(s) IV Push once  dextrose 50% Injectable 25 Gram(s) IV Push once  famotidine    Tablet 20 milliGRAM(s) Oral daily  furosemide   Injectable 80 milliGRAM(s) IV Push two times a day  insulin glargine Injectable (LANTUS) 10 Unit(s) SubCutaneous at bedtime  insulin lispro (HumaLOG) corrective regimen sliding scale   SubCutaneous three times a day before meals  insulin lispro (HumaLOG) corrective regimen sliding scale   SubCutaneous at bedtime  insulin lispro Injectable (HumaLOG) 4 Unit(s) SubCutaneous three times a day before meals  methylPREDNISolone sodium succinate Injectable 60 milliGRAM(s) IV Push two times a day  mycophenolate mofetil 1000 milliGRAM(s) Oral two times a day  nystatin    Suspension [id number] Unit(s) Swish and Swallow four times a day  tacrolimus ER Tablet (ENVARSUS XR) 4 milliGRAM(s) Oral daily  trimethoprim   80 mG/sulfamethoxazole 400 mG 1 Tablet(s) Oral daily  valGANciclovir 450 milliGRAM(s) Oral daily    MEDICATIONS  (PRN):  acetaminophen   Tablet .. 650 milliGRAM(s) Oral every 6 hours PRN Mild Pain (1 - 3), Moderate Pain (4 - 6)  dextrose 40% Gel 15 Gram(s) Oral once PRN Blood Glucose LESS THAN 70 milliGRAM(s)/deciliter  glucagon  Injectable 1 milliGRAM(s) IntraMuscular once PRN Glucose LESS THAN 70 milligrams/deciliter  naloxone Injectable 0.1 milliGRAM(s) IV Push every 3 minutes PRN For ANY of the following changes in patient status:  A. RR LESS THAN 10 breaths per minute, B. Oxygen saturation LESS THAN 90%, C. Sedation score of 6  ondansetron Injectable 4 milliGRAM(s) IV Push every 6 hours PRN Nausea and/or Vomiting  ondansetron Injectable 4 milliGRAM(s) IV Push every 6 hours PRN Nausea  traMADol 25 milliGRAM(s) Oral every 6 hours PRN Severe Pain (7 - 10)        Allergies    No Known Allergies    Intolerances    Review of Systems:  The patient denies fever, chills; chest pain, SOB, palpitation; dizziness, weakness; nausea, vomiting; diarrhea, constipation; leg swelling; sick contacts; and recent travel.     Vital Signs Last 24 Hrs  T(C): 36.8 (24 Oct 2019 09:00), Max: 36.9 (23 Oct 2019 16:00)  T(F): 98.3 (24 Oct 2019 09:00), Max: 98.4 (23 Oct 2019 16:00)  HR: 89 (24 Oct 2019 09:00) (84 - 100)  BP: 153/81 (24 Oct 2019 09:00) (115/71 - 153/81)  BP(mean): 90 (23 Oct 2019 16:00) (86 - 90)  RR: 18 (24 Oct 2019 09:00) (16 - 18)  SpO2: 95% (24 Oct 2019 09:00) (95% - 96%)    PHYSICAL EXAM:  Gen: WD, WN, NAD  HEENT: PERRLA, EOMI. Oropharynx clear.  Neck: Supple, no JVD/Bruit. No thyromegaly.  Lungs: CTA B/L.  Heart: RRR, S1 S2, No m/r/g  Abd: Soft, nondistended. Appropriately TTP. Incision dressed, c/d/i. RONALD in place, serosanguious, No rebound or guarding  Ext: WWP. No clubbing, cyanosis, or edema. Palpable pedal pulses.  Neuro: AAOx3. CN II-XII grossly intact, No focal deficits       INTAKE & OUTPUT  10-23-19 @ 07:01  -  10-24-19 @ 07:00  --------------------------------------------------------  IN: 2940 mL / OUT: 297 mL / NET: 2643 mL    10-24-19 @ 07:01  -  10-24-19 @ 10:13  --------------------------------------------------------  IN: 270 mL / OUT: 45 mL / NET: 225 mL        LABS:                        8.3    13.63 )-----------( 143      ( 24 Oct 2019 06:14 )             26.1     10-24    132<L>  |  93<L>  |  45<H>  ----------------------------<  290<H>  5.2   |  23  |  7.99<H>    Ca    7.6<L>      24 Oct 2019 06:14  Phos  6.4     10-24  Mg     2.2     10-24    TPro  5.4<L>  /  Alb  3.0<L>  /  TBili  0.2  /  DBili  x   /  AST  29  /  ALT  6<L>  /  AlkPhos  103  10-24        RADIOLOGY & ADDITIONAL TESTS:    US Trans Kidney w/ Doppler, Right (10.22.19 @ 16:02)  FINDINGS:    Renal Transplant: 11.7 cm. A cyst with internal echoes measures 1.7 x 1.5   x 1.0 cm. Nohydronephrosis or calculi. No peritransplant fluid   collection.    Urinary bladder: Nondistended.    Color and spectral Doppler reveals slightly diminished perfusion to the   lower pole..    Peak iliac artery velocity is 209 cm/sec pre-anastomosis, 199 cm/sec at   the anastomosis, and 170 cm/sec post anastomosis.    Transplant Renal Artery:   Peak systolic velocity is 224 cm/sec anastomosis, 148 cm/sec proximal, 70   cm/sec mid, and 66 cm/sec hilum. The distal segment was not visualized.   The peak systolic velocity of the second renal artery ranges from 70-88   cm/sec in the distal to hilar regions.  Resistive Indices Range: 0.49 - 0.68.    Transplant Renal Vein: Patent..    IMPRESSION:     Slightly diminished cortical perfusion at the lower pole of the   transplant.    Normal blood flow demonstrated within both renal transplant arteries and   the renal transplant vein.    Intrarenal arterial resistive indices within normal limits.

## 2019-10-24 NOTE — PROGRESS NOTE ADULT - SUBJECTIVE AND OBJECTIVE BOX
[facility] DIVISION OF KIDNEY DISEASES AND HYPERTENSION -- FOLLOW UP NOTE  --------------------------------------------------------------------------------  Chief Complaint:    24 hour events/subjective:  - no overnight events, vitals wnl, total uop yesterday 217 cc (hightower), 56 cc (RONALD drain), net positive 2L  - pt seen and examined at bedside who report feels well overall, incision pain much better today  - labs noted for K 5.2, awaiting tacro trough      PAST HISTORY  --------------------------------------------------------------------------------  No significant changes to PMH, PSH, FHx, SHx, unless otherwise noted    ALLERGIES & MEDICATIONS  --------------------------------------------------------------------------------  Allergies    No Known Allergies    Intolerances      Standing Inpatient Medications  aspirin enteric coated 81 milliGRAM(s) Oral daily  chlorhexidine 2% Cloths 1 Application(s) Topical daily  chlorhexidine 4% Liquid 1 Application(s) Topical <User Schedule>  dextrose 5%. 1000 milliLiter(s) IV Continuous <Continuous>  dextrose 50% Injectable 12.5 Gram(s) IV Push once  dextrose 50% Injectable 25 Gram(s) IV Push once  dextrose 50% Injectable 25 Gram(s) IV Push once  famotidine    Tablet 20 milliGRAM(s) Oral daily  furosemide   Injectable 80 milliGRAM(s) IV Push two times a day  insulin glargine Injectable (LANTUS) 10 Unit(s) SubCutaneous at bedtime  insulin lispro (HumaLOG) corrective regimen sliding scale   SubCutaneous three times a day before meals  insulin lispro (HumaLOG) corrective regimen sliding scale   SubCutaneous at bedtime  insulin lispro Injectable (HumaLOG) 4 Unit(s) SubCutaneous three times a day before meals  methylPREDNISolone sodium succinate Injectable 60 milliGRAM(s) IV Push two times a day  mycophenolate mofetil 1000 milliGRAM(s) Oral two times a day  nystatin    Suspension 764653 Unit(s) Swish and Swallow four times a day  tacrolimus ER Tablet (ENVARSUS XR) 4 milliGRAM(s) Oral daily  trimethoprim   80 mG/sulfamethoxazole 400 mG 1 Tablet(s) Oral daily  valGANciclovir 450 milliGRAM(s) Oral daily    PRN Inpatient Medications  acetaminophen   Tablet .. 650 milliGRAM(s) Oral every 6 hours PRN  dextrose 40% Gel 15 Gram(s) Oral once PRN  glucagon  Injectable 1 milliGRAM(s) IntraMuscular once PRN  naloxone Injectable 0.1 milliGRAM(s) IV Push every 3 minutes PRN  ondansetron Injectable 4 milliGRAM(s) IV Push every 6 hours PRN  ondansetron Injectable 4 milliGRAM(s) IV Push every 6 hours PRN  traMADol 25 milliGRAM(s) Oral every 6 hours PRN      REVIEW OF SYSTEMS  Gen: No fatigue, fevers/chills, weakness  Respiratory: No dyspnea, cough, wheezing  CV: No chest pain, PND, orthopnea  GI: mild abdominal pain from surgical incision, pass gas.  No bowel movement, nausea, vomiting, melena  : No dysuria, hematuria  MSK: no edema    All other systems were reviewed and are negative, except as noted.    VITALS/PHYSICAL EXAM  --------------------------------------------------------------------------------  T(C): 36.8 (10-24-19 @ 09:00), Max: 36.9 (10-23-19 @ 16:00)  HR: 89 (10-24-19 @ 09:00) (84 - 100)  BP: 153/81 (10-24-19 @ 09:00) (115/71 - 153/81)  RR: 18 (10-24-19 @ 09:00) (16 - 18)  SpO2: 95% (10-24-19 @ 09:00) (95% - 96%)  Wt(kg): --        10-23-19 @ 07:01  -  10-24-19 @ 07:00  --------------------------------------------------------  IN: 2940 mL / OUT: 297 mL / NET: 2643 mL    10-24-19 @ 07:01  -  10-24-19 @ 10:31  --------------------------------------------------------  IN: 270 mL / OUT: 65 mL / NET: 205 mL      Physical Exam:  	Gen: NAD, well-appearing on room air  	Pulm: CTA B/L, no crackles/wheezing  	CV: RRR, S1S2; no systolic  	Abd: +BS, soft, nontender/nondistended, incision c/d/i, RONALD drain  	: hightower w/ hematuria   	LE: Warm, no edema, LUE AVF+thrills    LABS/STUDIES  --------------------------------------------------------------------------------              8.3    13.63 >-----------<  143      [10-24-19 @ 06:14]              26.1     132  |  93  |  45  ----------------------------<  290      [10-24-19 @ 06:14]  5.2   |  23  |  7.99        Ca     7.6     [10-24-19 @ 06:14]      Mg     2.2     [10-24-19 @ 06:14]      Phos  6.4     [10-24-19 @ 06:14]    TPro  5.4  /  Alb  3.0  /  TBili  0.2  /  DBili  x   /  AST  29  /  ALT  6   /  AlkPhos  103  [10-24-19 @ 06:14]          Creatinine Trend:  SCr 7.99 [10-24 @ 06:14]  SCr 4.95 [10-23 @ 04:25]  SCr 9.78 [10-22 @ 19:41]  SCr 8.94 [10-22 @ 14:46]  SCr 9.46 [10-22 @ 05:48]        HbA1c 6.5      [10-23-19 @ 08:37]    HBsAg Nonreact      [10-22-19 @ 09:21]  HCV 0.10, Nonreact      [10-22-19 @ 09:21]

## 2019-10-24 NOTE — PROGRESS NOTE ADULT - ASSESSMENT
39M PMHx ESRD 2/2 DM on HD (last dialyzed 10/22 via LUE AVF), IDDM2 (dx 2000) c/b retinopathy, HTN (2013), HLD admitted for HCV+ DDRT with Simulect induction.      Recipient   ABO O type, CPRA: 0%   CMV/EBV Status: CMV +, EBV +   Last HD: Monday 10/21/19     Donor (import?):  Donor ID: [id number]   Match:  [id number] then local match [id number]   OPO: [facility] [place]   Age: 41   ABO:  O   KDPI: 44%   COD:  Anoxia/Seizure   X Clamp Time  - 10/21/2019 – 04:53   Medical Hx:  ex- IVDU on methadone, kidney stones, UTI, ureter stent, HTN during pregnancy, smoker, anti - HCV + , HCV ARCADIO + , HBsAb +   Terminal Creatinine – 0.5   CIT: 30 hours  CMV +/EBV +   Kidney ETA to [facility]: 10/22/19 at 0600   Kidney Laterality   -Left

## 2019-10-24 NOTE — PROGRESS NOTE ADULT - PROBLEM SELECTOR PLAN 1
s/p HCV+ DDRT, simulect induction POD# 0 and 4.  s/p HD 10/22 for hyperK 7.3    - d/c IVF  - repeat renal allograft ultrasound duplex today  - will reassess need for dialysis including K and volume status  - c/w immunosuppression: Envarsus 4, Cellcept 1000 BID, Methyprednisolone taper  - c/w ppx: bactrim/valcyte/nystatin/pepcid  - check daily Tacrolimus trough 30 minutes before morning dose (goal 8-10)  - c/w Aspirin 81 (for renal u/s showing decreased flow in lower pole of kidney)  - trend SCr, strict I/O  - renally dose medications s/p HCV+ DDRT, simulect induction POD# 0 and 4.  s/p HD 10/22 for hyperK 7.3    - plan hemodialysis today   - d/c IVF  - repeat renal allograft ultrasound duplex today  - will reassess need for dialysis including K and volume status  - c/w immunosuppression: Envarsus 4, Cellcept 1000 BID, Methyprednisolone taper  - c/w ppx: bactrim/valcyte/nystatin/pepcid  - check daily Tacrolimus trough 30 minutes before morning dose (goal 8-10)  - c/w Aspirin 81 (for renal u/s showing decreased flow in lower pole of kidney)  - trend SCr, strict I/O  - renally dose medications

## 2019-10-24 NOTE — PROGRESS NOTE ADULT - PROBLEM SELECTOR PLAN 3
Hyperkalemia 2/2 DGF post renal transplant, K 7.3 to 4.2 s/p HD 10/22    - daily reassessment for need of dialysis  - start lasix IV 80 BID  - monitor K, strict I/O  - low K diet, nepro shakes Hyperkalemia 2/2 DGF post renal transplant, K 7.3 to 4.2 s/p HD 10/22    - plan hemodialysis today  - start lasix IV 80 BID  - monitor K, strict I/O  - low K diet, jessika mckenzie

## 2019-10-25 DIAGNOSIS — Z71.89 OTHER SPECIFIED COUNSELING: ICD-10-CM

## 2019-10-25 LAB
ALBUMIN SERPL ELPH-MCNC: 3.4 G/DL — SIGNIFICANT CHANGE UP (ref 3.3–5)
ALP SERPL-CCNC: 108 U/L — SIGNIFICANT CHANGE UP (ref 40–120)
ALT FLD-CCNC: <5 U/L — LOW (ref 10–45)
ANION GAP SERPL CALC-SCNC: 20 MMOL/L — HIGH (ref 5–17)
AST SERPL-CCNC: 22 U/L — SIGNIFICANT CHANGE UP (ref 10–40)
BASOPHILS # BLD AUTO: 0.02 K/UL — SIGNIFICANT CHANGE UP (ref 0–0.2)
BASOPHILS NFR BLD AUTO: 0.2 % — SIGNIFICANT CHANGE UP (ref 0–2)
BILIRUB SERPL-MCNC: 0.2 MG/DL — SIGNIFICANT CHANGE UP (ref 0.2–1.2)
BUN SERPL-MCNC: 38 MG/DL — HIGH (ref 7–23)
CALCIUM SERPL-MCNC: 7.5 MG/DL — LOW (ref 8.4–10.5)
CHLORIDE SERPL-SCNC: 91 MMOL/L — LOW (ref 96–108)
CO2 SERPL-SCNC: 26 MMOL/L — SIGNIFICANT CHANGE UP (ref 22–31)
CREAT SERPL-MCNC: 6.14 MG/DL — HIGH (ref 0.5–1.3)
EOSINOPHIL # BLD AUTO: 0 K/UL — SIGNIFICANT CHANGE UP (ref 0–0.5)
EOSINOPHIL NFR BLD AUTO: 0 % — SIGNIFICANT CHANGE UP (ref 0–6)
GLUCOSE BLDC GLUCOMTR-MCNC: 180 MG/DL — HIGH (ref 70–99)
GLUCOSE BLDC GLUCOMTR-MCNC: 232 MG/DL — HIGH (ref 70–99)
GLUCOSE BLDC GLUCOMTR-MCNC: 279 MG/DL — HIGH (ref 70–99)
GLUCOSE BLDC GLUCOMTR-MCNC: 285 MG/DL — HIGH (ref 70–99)
GLUCOSE SERPL-MCNC: 239 MG/DL — HIGH (ref 70–99)
HCT VFR BLD CALC: 26.2 % — LOW (ref 39–50)
HCT VFR BLD CALC: 26.6 % — LOW (ref 39–50)
HGB BLD-MCNC: 8.5 G/DL — LOW (ref 13–17)
HGB BLD-MCNC: 8.8 G/DL — LOW (ref 13–17)
IMM GRANULOCYTES NFR BLD AUTO: 0.9 % — SIGNIFICANT CHANGE UP (ref 0–1.5)
LYMPHOCYTES # BLD AUTO: 0.47 K/UL — LOW (ref 1–3.3)
LYMPHOCYTES # BLD AUTO: 4 % — LOW (ref 13–44)
MAGNESIUM SERPL-MCNC: 2.1 MG/DL — SIGNIFICANT CHANGE UP (ref 1.6–2.6)
MANUAL SMEAR VERIFICATION: SIGNIFICANT CHANGE UP
MCHC RBC-ENTMCNC: 30.1 PG — SIGNIFICANT CHANGE UP (ref 27–34)
MCHC RBC-ENTMCNC: 31.1 PG — SIGNIFICANT CHANGE UP (ref 27–34)
MCHC RBC-ENTMCNC: 32 GM/DL — SIGNIFICANT CHANGE UP (ref 32–36)
MCHC RBC-ENTMCNC: 33.6 GM/DL — SIGNIFICANT CHANGE UP (ref 32–36)
MCV RBC AUTO: 92.6 FL — SIGNIFICANT CHANGE UP (ref 80–100)
MCV RBC AUTO: 94.3 FL — SIGNIFICANT CHANGE UP (ref 80–100)
MONOCYTES # BLD AUTO: 1.01 K/UL — HIGH (ref 0–0.9)
MONOCYTES NFR BLD AUTO: 8.7 % — SIGNIFICANT CHANGE UP (ref 2–14)
NEUTROPHILS # BLD AUTO: 10.04 K/UL — HIGH (ref 1.8–7.4)
NEUTROPHILS NFR BLD AUTO: 86.2 % — HIGH (ref 43–77)
NRBC # BLD: 0 /100 WBCS — SIGNIFICANT CHANGE UP (ref 0–0)
PHOSPHATE SERPL-MCNC: 5.8 MG/DL — HIGH (ref 2.5–4.5)
PLAT MORPH BLD: NORMAL — SIGNIFICANT CHANGE UP
PLATELET # BLD AUTO: 146 K/UL — LOW (ref 150–400)
PLATELET # BLD AUTO: 152 K/UL — SIGNIFICANT CHANGE UP (ref 150–400)
POTASSIUM SERPL-MCNC: 4.8 MMOL/L — SIGNIFICANT CHANGE UP (ref 3.5–5.3)
POTASSIUM SERPL-SCNC: 4.8 MMOL/L — SIGNIFICANT CHANGE UP (ref 3.5–5.3)
PROT SERPL-MCNC: 5.9 G/DL — LOW (ref 6–8.3)
RBC # BLD: 2.82 M/UL — LOW (ref 4.2–5.8)
RBC # BLD: 2.83 M/UL — LOW (ref 4.2–5.8)
RBC # FLD: 13.4 % — SIGNIFICANT CHANGE UP (ref 10.3–14.5)
RBC # FLD: 13.4 % — SIGNIFICANT CHANGE UP (ref 10.3–14.5)
RBC BLD AUTO: NORMAL — SIGNIFICANT CHANGE UP
SODIUM SERPL-SCNC: 137 MMOL/L — SIGNIFICANT CHANGE UP (ref 135–145)
TACROLIMUS SERPL-MCNC: <2 NG/ML — SIGNIFICANT CHANGE UP
WBC # BLD: 11.64 K/UL — HIGH (ref 3.8–10.5)
WBC # BLD: 12.35 K/UL — HIGH (ref 3.8–10.5)
WBC # FLD AUTO: 11.64 K/UL — HIGH (ref 3.8–10.5)
WBC # FLD AUTO: 12.35 K/UL — HIGH (ref 3.8–10.5)

## 2019-10-25 PROCEDURE — 99232 SBSQ HOSP IP/OBS MODERATE 35: CPT | Mod: GC

## 2019-10-25 RX ORDER — FUROSEMIDE 40 MG
80 TABLET ORAL ONCE
Refills: 0 | Status: COMPLETED | OUTPATIENT
Start: 2019-10-25 | End: 2019-10-25

## 2019-10-25 RX ORDER — TACROLIMUS 5 MG/1
8 CAPSULE ORAL DAILY
Refills: 0 | Status: DISCONTINUED | OUTPATIENT
Start: 2019-10-26 | End: 2019-10-26

## 2019-10-25 RX ORDER — TACROLIMUS 5 MG/1
2 CAPSULE ORAL ONCE
Refills: 0 | Status: COMPLETED | OUTPATIENT
Start: 2019-10-25 | End: 2019-10-25

## 2019-10-25 RX ORDER — NIFEDIPINE 30 MG
30 TABLET, EXTENDED RELEASE 24 HR ORAL DAILY
Refills: 0 | Status: DISCONTINUED | OUTPATIENT
Start: 2019-10-25 | End: 2019-10-28

## 2019-10-25 RX ORDER — BASILIXIMAB 20 MG/5ML
20 INJECTION, POWDER, FOR SOLUTION INTRAVENOUS ONCE
Refills: 0 | Status: COMPLETED | OUTPATIENT
Start: 2019-10-26 | End: 2019-10-26

## 2019-10-25 RX ORDER — FUROSEMIDE 40 MG
10 TABLET ORAL
Qty: 500 | Refills: 0 | Status: DISCONTINUED | OUTPATIENT
Start: 2019-10-25 | End: 2019-10-26

## 2019-10-25 RX ORDER — LABETALOL HCL 100 MG
10 TABLET ORAL ONCE
Refills: 0 | Status: COMPLETED | OUTPATIENT
Start: 2019-10-25 | End: 2019-10-25

## 2019-10-25 RX ADMIN — Medication 500000 UNIT(S): at 18:07

## 2019-10-25 RX ADMIN — Medication 500000 UNIT(S): at 11:54

## 2019-10-25 RX ADMIN — MYCOPHENOLATE MOFETIL 1000 MILLIGRAM(S): 250 CAPSULE ORAL at 06:21

## 2019-10-25 RX ADMIN — Medication 4: at 08:59

## 2019-10-25 RX ADMIN — VALGANCICLOVIR 450 MILLIGRAM(S): 450 TABLET, FILM COATED ORAL at 11:55

## 2019-10-25 RX ADMIN — TACROLIMUS 2 MILLIGRAM(S): 5 CAPSULE ORAL at 13:27

## 2019-10-25 RX ADMIN — Medication 30 MILLIGRAM(S): at 06:22

## 2019-10-25 RX ADMIN — Medication 6 UNIT(S): at 08:58

## 2019-10-25 RX ADMIN — Medication 80 MILLIGRAM(S): at 10:32

## 2019-10-25 RX ADMIN — CHLORHEXIDINE GLUCONATE 1 APPLICATION(S): 213 SOLUTION TOPICAL at 07:10

## 2019-10-25 RX ADMIN — Medication 6 UNIT(S): at 18:32

## 2019-10-25 RX ADMIN — Medication 80 MILLIGRAM(S): at 06:22

## 2019-10-25 RX ADMIN — Medication 81 MILLIGRAM(S): at 11:55

## 2019-10-25 RX ADMIN — FAMOTIDINE 20 MILLIGRAM(S): 10 INJECTION INTRAVENOUS at 11:54

## 2019-10-25 RX ADMIN — INSULIN GLARGINE 10 UNIT(S): 100 INJECTION, SOLUTION SUBCUTANEOUS at 22:14

## 2019-10-25 RX ADMIN — Medication 30 MILLIGRAM(S): at 08:58

## 2019-10-25 RX ADMIN — TACROLIMUS 6 MILLIGRAM(S): 5 CAPSULE ORAL at 06:22

## 2019-10-25 RX ADMIN — Medication 6 UNIT(S): at 13:27

## 2019-10-25 RX ADMIN — CHLORHEXIDINE GLUCONATE 1 APPLICATION(S): 213 SOLUTION TOPICAL at 11:53

## 2019-10-25 RX ADMIN — Medication 10 MILLIGRAM(S): at 06:22

## 2019-10-25 RX ADMIN — MYCOPHENOLATE MOFETIL 1000 MILLIGRAM(S): 250 CAPSULE ORAL at 18:07

## 2019-10-25 RX ADMIN — Medication 30 MILLIGRAM(S): at 18:06

## 2019-10-25 RX ADMIN — Medication 5 MG/HR: at 11:55

## 2019-10-25 RX ADMIN — Medication 6: at 18:32

## 2019-10-25 RX ADMIN — Medication 500000 UNIT(S): at 23:41

## 2019-10-25 RX ADMIN — Medication 6: at 13:28

## 2019-10-25 RX ADMIN — Medication 1 TABLET(S): at 11:55

## 2019-10-25 RX ADMIN — Medication 500000 UNIT(S): at 06:22

## 2019-10-25 NOTE — PROGRESS NOTE ADULT - ASSESSMENT
39M PMHx ESRD 2/2 DM on HD (last dialyzed 10/22 via LUE AVF), IDDM2 (dx 2000) c/b retinopathy, HTN (2013), HLD admitted for HCV+ DDRT with Simulect induction.    s/p HD 10/23, 10/25 for DGF and hyperK 5.5    Recipient   ABO O type, CPRA: 0%   CMV/EBV Status: CMV +, EBV +   Last HD: Monday 10/21/19     Donor (import?):  Donor ID: [id number]   Match:  [id number] then local match [id number]   [facility] [place] [place]   Age: 41   ABO:  O   KDPI: 44%   COD:  Anoxia/Seizure   X Clamp Time  - 10/21/2019 – 04:53   Medical Hx:  ex- IVDU on methadone, kidney stones, UTI, ureter stent, HTN during pregnancy, smoker, anti - HCV + , HCV ARCADIO + , HBsAb +   Terminal Creatinine – 0.5   CIT: 30 hours  CMV +/EBV +   Kidney ETA to [facility]: 10/22/19 at 0600   Kidney Laterality   -Left

## 2019-10-25 NOTE — PROGRESS NOTE ADULT - PROBLEM SELECTOR PLAN 3
Hyperkalemia 2/2 DGF post renal transplant, K 7.3 to 4.2 s/p HD 10/22, 10/24    - c/w lasix as stated above  - monitor K, strict I/O  - low K diet, nepro shakes

## 2019-10-25 NOTE — PROVIDER CONTACT NOTE (OTHER) - ACTION/TREATMENT ORDERED:
MD notified and aware; give tramadol and reassess BP and pain in 45min-1 hr.
MD notified and aware; will order 10mg IVP of labetalol. Reassess BP after
recheck BS in 40  mins- Pt BS rechecked @ 15:06- - PA Donald aware- increase pt sliding scale & premeal insulin, recheck pt BS in 1 hr

## 2019-10-25 NOTE — PROVIDER CONTACT NOTE (OTHER) - ASSESSMENT
Pt has no complaint of headache; is complaining of RLE pain 6/10. Will give tramadol prn po and reassess BP.
Pt is resting comfortably in bed. No c/o headache or pain.
pt asymptomatic, no c/o of pain, A&Ox4, VS as per flowsheet

## 2019-10-25 NOTE — PROGRESS NOTE ADULT - SUBJECTIVE AND OBJECTIVE BOX
Transplant Surgery Multidisciplinary Progress Note    HCV + DDRT 10/22/2019    Present: Patient seen with multidisciplinary team including Transplant Surgeon:  Dr. Gabriel,. Transplant Nephrologist: Dr. Sinclair, Nephrology fellow Dr. Ramsey, Pharmacists: Kvng Turner.  Nurse Practitioner: DON Jackson, and unit nurse during am rounds and examined with Dr. Gabriel   Disciplines not in attendance will be notified of the plan.     HPI:  39M with PMHx of IDDM () on insulin since , ESRD on HD M/W/F since  (last HD Monday 10/22) Via Left forearm AVF, HTN (), HLD, Retinopathy,  Cataract surgery and laser eye surgery s/p HCV+ DDRT with Simulect induction on 10/22. ureter stented.        Donor information:   [employer] ID: [id number]  Match:  [id number] then local match [id number]  [facility]  ABO:  O  KDPI: 44%  Cold Ischemia time: 30 hrs  CMV +/EBV +    OR:  donor renal transplant to right iliac vessels  Two arteries, anastomosed separately, single vein, Extramedullary renal pelvis with single ureter  Placement of double J ureteral stent    Interval Events: POD 3 s/p DDRT c/b DGF  - urine function improving, on lasix 80mg IV bid, HD yesterday, renal us with patent vessels  - Tolerating Regular diet, + bowel function  - Pain controlled on tylenol/tramadol    Potential Discharge date: pending clinical improvement     Education:  Medications    Plan of care:  See Below    MEDICATIONS  (STANDING):  aspirin enteric coated 81 milliGRAM(s) Oral daily  chlorhexidine 2% Cloths 1 Application(s) Topical daily  chlorhexidine 4% Liquid 1 Application(s) Topical <User Schedule>  dextrose 5%. 1000 milliLiter(s) (50 mL/Hr) IV Continuous <Continuous>  dextrose 50% Injectable 12.5 Gram(s) IV Push once  dextrose 50% Injectable 25 Gram(s) IV Push once  dextrose 50% Injectable 25 Gram(s) IV Push once  famotidine    Tablet 20 milliGRAM(s) Oral daily  furosemide   Injectable 80 milliGRAM(s) IV Push once  furosemide Infusion 10 mG/Hr (5 mL/Hr) IV Continuous <Continuous>  insulin glargine Injectable (LANTUS) 10 Unit(s) SubCutaneous at bedtime  insulin lispro (HumaLOG) corrective regimen sliding scale   SubCutaneous three times a day before meals  insulin lispro (HumaLOG) corrective regimen sliding scale   SubCutaneous at bedtime  insulin lispro Injectable (HumaLOG) 6 Unit(s) SubCutaneous three times a day before meals  methylPREDNISolone sodium succinate Injectable 30 milliGRAM(s) IV Push two times a day  mycophenolate mofetil 1000 milliGRAM(s) Oral two times a day  NIFEdipine XL 30 milliGRAM(s) Oral daily  nystatin    Suspension 840512 Unit(s) Swish and Swallow four times a day  tacrolimus ER Tablet (ENVARSUS XR) 6 milliGRAM(s) Oral daily  trimethoprim   80 mG/sulfamethoxazole 400 mG 1 Tablet(s) Oral daily  valGANciclovir 450 milliGRAM(s) Oral daily    MEDICATIONS  (PRN):  acetaminophen   Tablet .. 650 milliGRAM(s) Oral every 6 hours PRN Mild Pain (1 - 3), Moderate Pain (4 - 6)  dextrose 40% Gel 15 Gram(s) Oral once PRN Blood Glucose LESS THAN 70 milliGRAM(s)/deciliter  glucagon  Injectable 1 milliGRAM(s) IntraMuscular once PRN Glucose LESS THAN 70 milligrams/deciliter  naloxone Injectable 0.1 milliGRAM(s) IV Push every 3 minutes PRN For ANY of the following changes in patient status:  A. RR LESS THAN 10 breaths per minute, B. Oxygen saturation LESS THAN 90%, C. Sedation score of 6  ondansetron Injectable 4 milliGRAM(s) IV Push every 6 hours PRN Nausea and/or Vomiting  ondansetron Injectable 4 milliGRAM(s) IV Push every 6 hours PRN Nausea  traMADol 25 milliGRAM(s) Oral every 6 hours PRN Severe Pain (7 - 10)      PAST MEDICAL & SURGICAL HISTORY:  Anemia, unspecified type  DM (diabetes mellitus)  ESRD (end stage renal disease) on dialysis: M W,F  Hypercholesterolemia  HTN (hypertension)  AV fistula: L forearm  H/O eye surgery      Vital Signs Last 24 Hrs  T(C): 36.9 (25 Oct 2019 09:00), Max: 36.9 (25 Oct 2019 01:00)  T(F): 98.4 (25 Oct 2019 09:00), Max: 98.4 (25 Oct 2019 01:00)  HR: 90 (25 Oct 2019 09:00) (89 - 103)  BP: 137/81 (25 Oct 2019 09:51) (137/81 - 184/109)  BP(mean): --  RR: 18 (25 Oct 2019 09:00) (18 - 18)  SpO2: 98% (25 Oct 2019 09:00) (94% - 99%)    I&O's Summary    24 Oct 2019 07:  -  25 Oct 2019 07:00  --------------------------------------------------------  IN: 1830 mL / OUT: 2215 mL / NET: -385 mL    25 Oct 2019 07:01  -  25 Oct 2019 10:21  --------------------------------------------------------  IN: 240 mL / OUT: 85 mL / NET: 155 mL                       8.5    11.64 )-----------( 152      ( 25 Oct 2019 08:18 )             26.6     10-    137  |  91<L>  |  38<H>  ----------------------------<  239<H>  4.8   |  26  |  6.14<H>    Ca    7.5<L>      25 Oct 2019 06:31  Phos  5.8     10-25  Mg     2.1     10-25    TPro  5.9<L>  /  Alb  3.4  /  TBili  0.2  /  DBili  x   /  AST  22  /  ALT  <5<L>  /  AlkPhos  108  10-25    Tacrolimus (), Serum: <2.0 ng/mL (10-24 @ 07:46)    Review of systems  Gen: No weight changes, fatigue, fevers/chills, weakness  Skin: No rashes  Head/Eyes/Ears/Mouth: No headache; Normal hearing; Normal vision w/o blurriness; No sinus pain/discomfort, sore throat  Respiratory: No dyspnea, cough, wheezing, hemoptysis  CV: No chest pain, PND, orthopnea  GI: C/O mild abdominal pain at surgical site, diarrhea, constipation, nausea, vomiting, melena, hematochezia  : No increased frequency, dysuria, hematuria, nocturia  MSK: No joint pain/swelling; no back pain; no edema  Neuro: No dizziness/lightheadedness, weakness, seizures, numbness, tingling  Heme: No easy bruising or bleeding  Endo: No heat/cold intolerance  Psych: No significant nervousness, anxiety, stress, depression  All other systems were reviewed and are negative, except as noted.      PHYSICAL EXAM:  Constitutional: Well developed / well nourished  Eyes: Anicteric, PERRLA  ENMT: nc/at  Neck: no JVD  Respiratory: CTA B/L  Cardiovascular: RRR  Gastrointestinal:  non distended, soft, incision c/d/i, RONALD x 1 with SS drainage   Genitourinary: Urinary catheter in place  Extremities: SCD's in place and working bilaterally  Vascular: Palpable dp pulses bilaterally  Neurological: A&O x3  Skin: incision c/d/i  Musculoskeletal: Moving all extremities  Psychiatric: Responsive

## 2019-10-25 NOTE — PROGRESS NOTE ADULT - ASSESSMENT
39M with PMHx of IDDM (2000) on insulin since 2011, ESRD on HD M/W/F since 2013 (last HD Monday 10/22) Via Left forearm AVF, HTN (2013), HLD, Retinopathy,  Cataract surgery and laser eye surgery s/p HCV+ DDRT with Simulect induction on 10/22.     [] s/p DDRT 10/22/19 c/b DGF  - urine output improving slowly, start Lasix gtt  - Immuno: Envarsus 6mg daily, MMF 1000mg BID, Steroid taper, Simulect tomorrow  - Ppx: Nystatin, Bactrim, Valcyte  - Diet: Diabetic  - Pain: tylenol/toradol  - Continue hightower for strict I/Os  - Pepcid for GI ppx    [] DM  - Lantus 10u qhs, Lispro 6u with meals    [] HTN  - Start Nifedipine XL 30mg daily

## 2019-10-25 NOTE — PROGRESS NOTE ADULT - SUBJECTIVE AND OBJECTIVE BOX
[facility] DIVISION OF KIDNEY DISEASES AND HYPERTENSION -- FOLLOW UP NOTE  --------------------------------------------------------------------------------  Chief Complaint:    24 hour events/subjective:  - yesterday had HD session removed 1L.  Had U/S renal showing good blood flow thru renal artery and vein, normal resistive indices however can't visualize 2nd renal artery.  - overnight no events, vitals noted elevated SBP range 140-184, am 184/105.  Total UOP hightower 55 cc, bulb 40 cc, HD 1L (net neg 515)  - pt seen and examined at bedside report abdominal incision pain well controlled, passing gas however no bowel movement yet.        PAST HISTORY  --------------------------------------------------------------------------------  No significant changes to PMH, PSH, FHx, SHx, unless otherwise noted    ALLERGIES & MEDICATIONS  --------------------------------------------------------------------------------  Allergies    No Known Allergies    Intolerances      Standing Inpatient Medications  aspirin enteric coated 81 milliGRAM(s) Oral daily  chlorhexidine 2% Cloths 1 Application(s) Topical daily  chlorhexidine 4% Liquid 1 Application(s) Topical <User Schedule>  dextrose 5%. 1000 milliLiter(s) IV Continuous <Continuous>  dextrose 50% Injectable 12.5 Gram(s) IV Push once  dextrose 50% Injectable 25 Gram(s) IV Push once  dextrose 50% Injectable 25 Gram(s) IV Push once  famotidine    Tablet 20 milliGRAM(s) Oral daily  furosemide Infusion 10 mG/Hr IV Continuous <Continuous>  insulin glargine Injectable (LANTUS) 10 Unit(s) SubCutaneous at bedtime  insulin lispro (HumaLOG) corrective regimen sliding scale   SubCutaneous three times a day before meals  insulin lispro (HumaLOG) corrective regimen sliding scale   SubCutaneous at bedtime  insulin lispro Injectable (HumaLOG) 6 Unit(s) SubCutaneous three times a day before meals  methylPREDNISolone sodium succinate Injectable 30 milliGRAM(s) IV Push two times a day  mycophenolate mofetil 1000 milliGRAM(s) Oral two times a day  NIFEdipine XL 30 milliGRAM(s) Oral daily  nystatin    Suspension 160433 Unit(s) Swish and Swallow four times a day  tacrolimus ER Tablet (ENVARSUS XR) 6 milliGRAM(s) Oral daily  trimethoprim   80 mG/sulfamethoxazole 400 mG 1 Tablet(s) Oral daily  valGANciclovir 450 milliGRAM(s) Oral daily    PRN Inpatient Medications  acetaminophen   Tablet .. 650 milliGRAM(s) Oral every 6 hours PRN  dextrose 40% Gel 15 Gram(s) Oral once PRN  glucagon  Injectable 1 milliGRAM(s) IntraMuscular once PRN  naloxone Injectable 0.1 milliGRAM(s) IV Push every 3 minutes PRN  ondansetron Injectable 4 milliGRAM(s) IV Push every 6 hours PRN  ondansetron Injectable 4 milliGRAM(s) IV Push every 6 hours PRN  traMADol 25 milliGRAM(s) Oral every 6 hours PRN      REVIEW OF SYSTEMS  Gen: No fatigue, fevers/chills, weakness  Respiratory: No dyspnea, cough, wheezing  CV: No chest pain, PND, orthopnea  GI: mild abdominal pain from surgical incision, pass gas.  No bowel movement, nausea, vomiting, melena  : No dysuria, hematuria  MSK: no edema    All other systems were reviewed and are negative, except as noted.    VITALS/PHYSICAL EXAM  --------------------------------------------------------------------------------  T(C): 36.9 (10-25-19 @ 09:00), Max: 36.9 (10-25-19 @ 01:00)  HR: 90 (10-25-19 @ 09:00) (89 - 103)  BP: 137/81 (10-25-19 @ 09:51) (137/81 - 184/109)  RR: 18 (10-25-19 @ 09:00) (18 - 18)  SpO2: 98% (10-25-19 @ 09:00) (94% - 99%)  Wt(kg): --        10-24-19 @ 07:01  -  10-25-19 @ 07:00  --------------------------------------------------------  IN: 1830 mL / OUT: 2215 mL / NET: -385 mL    10-25-19 @ 07:01  -  10-25-19 @ 11:10  --------------------------------------------------------  IN: 240 mL / OUT: 85 mL / NET: 155 mL      Physical Exam:  	Gen: NAD, well-appearing on room air  	Pulm: CTA B/L, no crackles/wheezing  	CV: RRR, S1S2; no murmur  	Abd: +BS, soft, nontender/nondistended, incision c/d/i, RONALD drain  	: hightower w/ hematuria   	LE: Warm, no edema, LUE AVF +thrills    LABS/STUDIES  --------------------------------------------------------------------------------              8.5    11.64 >-----------<  152      [10-25-19 @ 08:18]              26.6     137  |  91  |  38  ----------------------------<  239      [10-25-19 @ 06:31]  4.8   |  26  |  6.14        Ca     7.5     [10-25-19 @ 06:31]      Mg     2.1     [10-25-19 @ 06:31]      Phos  5.8     [10-25-19 @ 06:31]    TPro  5.9  /  Alb  3.4  /  TBili  0.2  /  DBili  x   /  AST  22  /  ALT  <5  /  AlkPhos  108  [10-25-19 @ 06:31]    Creatinine Trend:  SCr 6.14 [10-25 @ 06:31]  SCr 8.32 [10-24 @ 13:58]  SCr 7.99 [10-24 @ 06:14]  SCr 4.95 [10-23 @ 04:25]  SCr 9.78 [10-22 @ 19:41]        HbA1c 6.5      [10-23-19 @ 08:37]    HBsAg Nonreact      [10-22-19 @ 09:21]  HCV 0.10, Nonreact      [10-22-19 @ 09:21] [facility] DIVISION OF KIDNEY DISEASES AND HYPERTENSION -- FOLLOW UP NOTE  --------------------------------------------------------------------------------  Chief Complaint:    24 hour events/subjective:  - yesterday had HD session removed 1L.  Had U/S renal showing good blood flow thru renal artery and vein, normal resistive indices however can't visualize 2nd renal artery.  - overnight no events, vitals noted elevated SBP range 140-184, am 184/105.  Total UOP hightower 55 cc, bulb 40 cc, HD 1L (net neg 515), improving uop  - pt seen and examined at bedside report abdominal incision pain well controlled, passing gas however no bowel movement yet.        PAST HISTORY  --------------------------------------------------------------------------------  No significant changes to PMH, PSH, FHx, SHx, unless otherwise noted    ALLERGIES & MEDICATIONS  --------------------------------------------------------------------------------  Allergies    No Known Allergies    Intolerances      Standing Inpatient Medications  aspirin enteric coated 81 milliGRAM(s) Oral daily  chlorhexidine 2% Cloths 1 Application(s) Topical daily  chlorhexidine 4% Liquid 1 Application(s) Topical <User Schedule>  dextrose 5%. 1000 milliLiter(s) IV Continuous <Continuous>  dextrose 50% Injectable 12.5 Gram(s) IV Push once  dextrose 50% Injectable 25 Gram(s) IV Push once  dextrose 50% Injectable 25 Gram(s) IV Push once  famotidine    Tablet 20 milliGRAM(s) Oral daily  furosemide Infusion 10 mG/Hr IV Continuous <Continuous>  insulin glargine Injectable (LANTUS) 10 Unit(s) SubCutaneous at bedtime  insulin lispro (HumaLOG) corrective regimen sliding scale   SubCutaneous three times a day before meals  insulin lispro (HumaLOG) corrective regimen sliding scale   SubCutaneous at bedtime  insulin lispro Injectable (HumaLOG) 6 Unit(s) SubCutaneous three times a day before meals  methylPREDNISolone sodium succinate Injectable 30 milliGRAM(s) IV Push two times a day  mycophenolate mofetil 1000 milliGRAM(s) Oral two times a day  NIFEdipine XL 30 milliGRAM(s) Oral daily  nystatin    Suspension 823491 Unit(s) Swish and Swallow four times a day  tacrolimus ER Tablet (ENVARSUS XR) 6 milliGRAM(s) Oral daily  trimethoprim   80 mG/sulfamethoxazole 400 mG 1 Tablet(s) Oral daily  valGANciclovir 450 milliGRAM(s) Oral daily    PRN Inpatient Medications  acetaminophen   Tablet .. 650 milliGRAM(s) Oral every 6 hours PRN  dextrose 40% Gel 15 Gram(s) Oral once PRN  glucagon  Injectable 1 milliGRAM(s) IntraMuscular once PRN  naloxone Injectable 0.1 milliGRAM(s) IV Push every 3 minutes PRN  ondansetron Injectable 4 milliGRAM(s) IV Push every 6 hours PRN  ondansetron Injectable 4 milliGRAM(s) IV Push every 6 hours PRN  traMADol 25 milliGRAM(s) Oral every 6 hours PRN      REVIEW OF SYSTEMS  Gen: No fatigue, fevers/chills, weakness  Respiratory: No dyspnea, cough, wheezing  CV: No chest pain, PND, orthopnea  GI: mild abdominal pain from surgical incision, pass gas.  No bowel movement, nausea, vomiting, melena  : No dysuria, hematuria  MSK: no edema    All other systems were reviewed and are negative, except as noted.    VITALS/PHYSICAL EXAM  --------------------------------------------------------------------------------  T(C): 36.9 (10-25-19 @ 09:00), Max: 36.9 (10-25-19 @ 01:00)  HR: 90 (10-25-19 @ 09:00) (89 - 103)  BP: 137/81 (10-25-19 @ 09:51) (137/81 - 184/109)  RR: 18 (10-25-19 @ 09:00) (18 - 18)  SpO2: 98% (10-25-19 @ 09:00) (94% - 99%)  Wt(kg): --        10-24-19 @ 07:01  -  10-25-19 @ 07:00  --------------------------------------------------------  IN: 1830 mL / OUT: 2215 mL / NET: -385 mL    10-25-19 @ 07:01  -  10-25-19 @ 11:10  --------------------------------------------------------  IN: 240 mL / OUT: 85 mL / NET: 155 mL      Physical Exam:  	Gen: NAD, well-appearing on room air  	Pulm: CTA B/L, no crackles/wheezing  	CV: RRR, S1S2; no murmur  	Abd: +BS, soft, nontender/nondistended, incision c/d/i, RONALD drain  	: hightower w/ hematuria   	LE: Warm, no edema, LUE AVF +thrills    LABS/STUDIES  --------------------------------------------------------------------------------              8.5    11.64 >-----------<  152      [10-25-19 @ 08:18]              26.6     137  |  91  |  38  ----------------------------<  239      [10-25-19 @ 06:31]  4.8   |  26  |  6.14        Ca     7.5     [10-25-19 @ 06:31]      Mg     2.1     [10-25-19 @ 06:31]      Phos  5.8     [10-25-19 @ 06:31]    TPro  5.9  /  Alb  3.4  /  TBili  0.2  /  DBili  x   /  AST  22  /  ALT  <5  /  AlkPhos  108  [10-25-19 @ 06:31]    Creatinine Trend:  SCr 6.14 [10-25 @ 06:31]  SCr 8.32 [10-24 @ 13:58]  SCr 7.99 [10-24 @ 06:14]  SCr 4.95 [10-23 @ 04:25]  SCr 9.78 [10-22 @ 19:41]        HbA1c 6.5      [10-23-19 @ 08:37]    HBsAg Nonreact      [10-22-19 @ 09:21]  HCV 0.10, Nonreact      [10-22-19 @ 09:21]

## 2019-10-25 NOTE — PROGRESS NOTE ADULT - PROBLEM SELECTOR PLAN 1
s/p HCV+ DDRT, simulect induction POD# 0 and 4.  s/p HD 10/22 for hyperK 7.3.  repeat renal u/s show good blood flow thru renal artery/vein    - no indication for hemodialysis today, will reassess later  - give lasix IV once today then switch lasix drip  - will reassess need for dialysis including K and volume status  - c/w immunosuppression: Envarsus 4, Cellcept 1000 BID, Methyprednisolone taper  - c/w ppx: bactrim/valcyte/nystatin/pepcid  - check daily Tacrolimus trough 30 minutes before morning dose (goal 8-10)  - c/w Aspirin 81 (for renal u/s showing decreased flow in lower pole of kidney)  - trend SCr, strict I/O  - renally dose medications

## 2019-10-26 LAB
ALBUMIN SERPL ELPH-MCNC: 3.1 G/DL — LOW (ref 3.3–5)
ALP SERPL-CCNC: 127 U/L — HIGH (ref 40–120)
ALT FLD-CCNC: <5 U/L — LOW (ref 10–45)
ANION GAP SERPL CALC-SCNC: 22 MMOL/L — HIGH (ref 5–17)
ANION GAP SERPL CALC-SCNC: 24 MMOL/L — HIGH (ref 5–17)
AST SERPL-CCNC: 17 U/L — SIGNIFICANT CHANGE UP (ref 10–40)
BASOPHILS # BLD AUTO: 0.01 K/UL — SIGNIFICANT CHANGE UP (ref 0–0.2)
BASOPHILS NFR BLD AUTO: 0.1 % — SIGNIFICANT CHANGE UP (ref 0–2)
BILIRUB SERPL-MCNC: 0.2 MG/DL — SIGNIFICANT CHANGE UP (ref 0.2–1.2)
BLD GP AB SCN SERPL QL: NEGATIVE — SIGNIFICANT CHANGE UP
BUN SERPL-MCNC: 75 MG/DL — HIGH (ref 7–23)
BUN SERPL-MCNC: 86 MG/DL — HIGH (ref 7–23)
CALCIUM SERPL-MCNC: 7.1 MG/DL — LOW (ref 8.4–10.5)
CALCIUM SERPL-MCNC: 7.7 MG/DL — LOW (ref 8.4–10.5)
CHLORIDE SERPL-SCNC: 91 MMOL/L — LOW (ref 96–108)
CHLORIDE SERPL-SCNC: 92 MMOL/L — LOW (ref 96–108)
CO2 SERPL-SCNC: 22 MMOL/L — SIGNIFICANT CHANGE UP (ref 22–31)
CO2 SERPL-SCNC: 22 MMOL/L — SIGNIFICANT CHANGE UP (ref 22–31)
CREAT SERPL-MCNC: 8.21 MG/DL — HIGH (ref 0.5–1.3)
CREAT SERPL-MCNC: 8.55 MG/DL — HIGH (ref 0.5–1.3)
EOSINOPHIL # BLD AUTO: 0 K/UL — SIGNIFICANT CHANGE UP (ref 0–0.5)
EOSINOPHIL NFR BLD AUTO: 0 % — SIGNIFICANT CHANGE UP (ref 0–6)
GLUCOSE BLDC GLUCOMTR-MCNC: 140 MG/DL — HIGH (ref 70–99)
GLUCOSE BLDC GLUCOMTR-MCNC: 247 MG/DL — HIGH (ref 70–99)
GLUCOSE BLDC GLUCOMTR-MCNC: 259 MG/DL — HIGH (ref 70–99)
GLUCOSE SERPL-MCNC: 226 MG/DL — HIGH (ref 70–99)
GLUCOSE SERPL-MCNC: 253 MG/DL — HIGH (ref 70–99)
HCT VFR BLD CALC: 23.5 % — LOW (ref 39–50)
HCT VFR BLD CALC: 24.5 % — LOW (ref 39–50)
HCT VFR BLD CALC: 24.6 % — LOW (ref 39–50)
HGB BLD-MCNC: 7.7 G/DL — LOW (ref 13–17)
HGB BLD-MCNC: 8 G/DL — LOW (ref 13–17)
HGB BLD-MCNC: 8.1 G/DL — LOW (ref 13–17)
IMM GRANULOCYTES NFR BLD AUTO: 1.6 % — HIGH (ref 0–1.5)
LYMPHOCYTES # BLD AUTO: 0.54 K/UL — LOW (ref 1–3.3)
LYMPHOCYTES # BLD AUTO: 6.3 % — LOW (ref 13–44)
MAGNESIUM SERPL-MCNC: 2.2 MG/DL — SIGNIFICANT CHANGE UP (ref 1.6–2.6)
MCHC RBC-ENTMCNC: 30.4 PG — SIGNIFICANT CHANGE UP (ref 27–34)
MCHC RBC-ENTMCNC: 30.5 PG — SIGNIFICANT CHANGE UP (ref 27–34)
MCHC RBC-ENTMCNC: 31 PG — SIGNIFICANT CHANGE UP (ref 27–34)
MCHC RBC-ENTMCNC: 32.5 GM/DL — SIGNIFICANT CHANGE UP (ref 32–36)
MCHC RBC-ENTMCNC: 32.8 GM/DL — SIGNIFICANT CHANGE UP (ref 32–36)
MCHC RBC-ENTMCNC: 33.1 GM/DL — SIGNIFICANT CHANGE UP (ref 32–36)
MCV RBC AUTO: 92.1 FL — SIGNIFICANT CHANGE UP (ref 80–100)
MCV RBC AUTO: 92.9 FL — SIGNIFICANT CHANGE UP (ref 80–100)
MCV RBC AUTO: 95.3 FL — SIGNIFICANT CHANGE UP (ref 80–100)
MONOCYTES # BLD AUTO: 0.85 K/UL — SIGNIFICANT CHANGE UP (ref 0–0.9)
MONOCYTES NFR BLD AUTO: 9.8 % — SIGNIFICANT CHANGE UP (ref 2–14)
NEUTROPHILS # BLD AUTO: 7.09 K/UL — SIGNIFICANT CHANGE UP (ref 1.8–7.4)
NEUTROPHILS NFR BLD AUTO: 82.2 % — HIGH (ref 43–77)
NRBC # BLD: 0 /100 WBCS — SIGNIFICANT CHANGE UP (ref 0–0)
NRBC # BLD: 0 /100 WBCS — SIGNIFICANT CHANGE UP (ref 0–0)
PHOSPHATE SERPL-MCNC: 7.4 MG/DL — HIGH (ref 2.5–4.5)
PLATELET # BLD AUTO: 159 K/UL — SIGNIFICANT CHANGE UP (ref 150–400)
PLATELET # BLD AUTO: 164 K/UL — SIGNIFICANT CHANGE UP (ref 150–400)
PLATELET # BLD AUTO: 165 K/UL — SIGNIFICANT CHANGE UP (ref 150–400)
POTASSIUM SERPL-MCNC: 5.2 MMOL/L — SIGNIFICANT CHANGE UP (ref 3.5–5.3)
POTASSIUM SERPL-MCNC: 5.5 MMOL/L — HIGH (ref 3.5–5.3)
POTASSIUM SERPL-SCNC: 5.2 MMOL/L — SIGNIFICANT CHANGE UP (ref 3.5–5.3)
POTASSIUM SERPL-SCNC: 5.5 MMOL/L — HIGH (ref 3.5–5.3)
PROT SERPL-MCNC: 5.6 G/DL — LOW (ref 6–8.3)
RBC # BLD: 2.53 M/UL — LOW (ref 4.2–5.8)
RBC # BLD: 2.58 M/UL — LOW (ref 4.2–5.8)
RBC # BLD: 2.66 M/UL — LOW (ref 4.2–5.8)
RBC # FLD: 13.4 % — SIGNIFICANT CHANGE UP (ref 10.3–14.5)
RBC # FLD: 13.5 % — SIGNIFICANT CHANGE UP (ref 10.3–14.5)
RBC # FLD: 13.5 % — SIGNIFICANT CHANGE UP (ref 10.3–14.5)
RH IG SCN BLD-IMP: POSITIVE — SIGNIFICANT CHANGE UP
SODIUM SERPL-SCNC: 136 MMOL/L — SIGNIFICANT CHANGE UP (ref 135–145)
SODIUM SERPL-SCNC: 137 MMOL/L — SIGNIFICANT CHANGE UP (ref 135–145)
TACROLIMUS SERPL-MCNC: 3 NG/ML — SIGNIFICANT CHANGE UP
WBC # BLD: 8.63 K/UL — SIGNIFICANT CHANGE UP (ref 3.8–10.5)
WBC # BLD: 8.64 K/UL — SIGNIFICANT CHANGE UP (ref 3.8–10.5)
WBC # BLD: 8.67 K/UL — SIGNIFICANT CHANGE UP (ref 3.8–10.5)
WBC # FLD AUTO: 8.63 K/UL — SIGNIFICANT CHANGE UP (ref 3.8–10.5)
WBC # FLD AUTO: 8.64 K/UL — SIGNIFICANT CHANGE UP (ref 3.8–10.5)
WBC # FLD AUTO: 8.67 K/UL — SIGNIFICANT CHANGE UP (ref 3.8–10.5)

## 2019-10-26 PROCEDURE — 99233 SBSQ HOSP IP/OBS HIGH 50: CPT

## 2019-10-26 RX ORDER — SENNA PLUS 8.6 MG/1
2 TABLET ORAL AT BEDTIME
Refills: 0 | Status: DISCONTINUED | OUTPATIENT
Start: 2019-10-26 | End: 2019-10-28

## 2019-10-26 RX ORDER — TACROLIMUS 5 MG/1
10 CAPSULE ORAL DAILY
Refills: 0 | Status: DISCONTINUED | OUTPATIENT
Start: 2019-10-27 | End: 2019-10-28

## 2019-10-26 RX ORDER — INSULIN LISPRO 100/ML
8 VIAL (ML) SUBCUTANEOUS
Refills: 0 | Status: DISCONTINUED | OUTPATIENT
Start: 2019-10-26 | End: 2019-10-28

## 2019-10-26 RX ORDER — INSULIN GLARGINE 100 [IU]/ML
12 INJECTION, SOLUTION SUBCUTANEOUS AT BEDTIME
Refills: 0 | Status: DISCONTINUED | OUTPATIENT
Start: 2019-10-26 | End: 2019-10-28

## 2019-10-26 RX ADMIN — MYCOPHENOLATE MOFETIL 1000 MILLIGRAM(S): 250 CAPSULE ORAL at 17:03

## 2019-10-26 RX ADMIN — Medication 500000 UNIT(S): at 17:04

## 2019-10-26 RX ADMIN — Medication 5 MILLIGRAM(S): at 22:20

## 2019-10-26 RX ADMIN — BASILIXIMAB 100 MILLIGRAM(S): 20 INJECTION, POWDER, FOR SOLUTION INTRAVENOUS at 08:18

## 2019-10-26 RX ADMIN — TACROLIMUS 8 MILLIGRAM(S): 5 CAPSULE ORAL at 06:10

## 2019-10-26 RX ADMIN — VALGANCICLOVIR 450 MILLIGRAM(S): 450 TABLET, FILM COATED ORAL at 11:49

## 2019-10-26 RX ADMIN — Medication 4: at 08:46

## 2019-10-26 RX ADMIN — SENNA PLUS 2 TABLET(S): 8.6 TABLET ORAL at 22:20

## 2019-10-26 RX ADMIN — Medication 5 MG/HR: at 06:10

## 2019-10-26 RX ADMIN — FAMOTIDINE 20 MILLIGRAM(S): 10 INJECTION INTRAVENOUS at 11:49

## 2019-10-26 RX ADMIN — CHLORHEXIDINE GLUCONATE 1 APPLICATION(S): 213 SOLUTION TOPICAL at 11:49

## 2019-10-26 RX ADMIN — Medication 6 UNIT(S): at 08:47

## 2019-10-26 RX ADMIN — Medication 1 TABLET(S): at 11:49

## 2019-10-26 RX ADMIN — CHLORHEXIDINE GLUCONATE 1 APPLICATION(S): 213 SOLUTION TOPICAL at 06:11

## 2019-10-26 RX ADMIN — Medication 500000 UNIT(S): at 11:49

## 2019-10-26 RX ADMIN — Medication 20 MILLIGRAM(S): at 06:10

## 2019-10-26 RX ADMIN — Medication 500000 UNIT(S): at 06:10

## 2019-10-26 RX ADMIN — Medication 6 UNIT(S): at 12:32

## 2019-10-26 RX ADMIN — MYCOPHENOLATE MOFETIL 1000 MILLIGRAM(S): 250 CAPSULE ORAL at 06:10

## 2019-10-26 RX ADMIN — Medication 6: at 12:32

## 2019-10-26 RX ADMIN — Medication 20 MILLIGRAM(S): at 17:03

## 2019-10-26 RX ADMIN — Medication 30 MILLIGRAM(S): at 06:10

## 2019-10-26 RX ADMIN — Medication 81 MILLIGRAM(S): at 11:48

## 2019-10-26 NOTE — PROGRESS NOTE ADULT - SUBJECTIVE AND OBJECTIVE BOX
[facility] DIVISION OF KIDNEY DISEASES AND HYPERTENSION -- FOLLOW UP NOTE  --------------------------------------------------------------------------------  Chief Complaint:DDTX    24 hour events/subjective:  NONE  Feels fair  Some discomfort over the surgery        PAST HISTORY  --------------------------------------------------------------------------------  No significant changes to PMH, PSH, FHx, SHx, unless otherwise noted    ALLERGIES & MEDICATIONS  --------------------------------------------------------------------------------  Allergies    No Known Allergies    Intolerances      Standing Inpatient Medications  aspirin enteric coated 81 milliGRAM(s) Oral daily  chlorhexidine 2% Cloths 1 Application(s) Topical daily  chlorhexidine 4% Liquid 1 Application(s) Topical <User Schedule>  dextrose 5%. 1000 milliLiter(s) IV Continuous <Continuous>  dextrose 50% Injectable 12.5 Gram(s) IV Push once  dextrose 50% Injectable 25 Gram(s) IV Push once  dextrose 50% Injectable 25 Gram(s) IV Push once  famotidine    Tablet 20 milliGRAM(s) Oral daily  insulin glargine Injectable (LANTUS) 10 Unit(s) SubCutaneous at bedtime  insulin lispro (HumaLOG) corrective regimen sliding scale   SubCutaneous three times a day before meals  insulin lispro (HumaLOG) corrective regimen sliding scale   SubCutaneous at bedtime  insulin lispro Injectable (HumaLOG) 6 Unit(s) SubCutaneous three times a day before meals  mycophenolate mofetil 1000 milliGRAM(s) Oral two times a day  NIFEdipine XL 30 milliGRAM(s) Oral daily  nystatin    Suspension 333181 Unit(s) Swish and Swallow four times a day  predniSONE   Tablet 20 milliGRAM(s) Oral two times a day  trimethoprim   80 mG/sulfamethoxazole 400 mG 1 Tablet(s) Oral daily  valGANciclovir 450 milliGRAM(s) Oral daily    PRN Inpatient Medications  acetaminophen   Tablet .. 650 milliGRAM(s) Oral every 6 hours PRN  dextrose 40% Gel 15 Gram(s) Oral once PRN  glucagon  Injectable 1 milliGRAM(s) IntraMuscular once PRN  naloxone Injectable 0.1 milliGRAM(s) IV Push every 3 minutes PRN  ondansetron Injectable 4 milliGRAM(s) IV Push every 6 hours PRN  ondansetron Injectable 4 milliGRAM(s) IV Push every 6 hours PRN  traMADol 25 milliGRAM(s) Oral every 6 hours PRN      REVIEW OF SYSTEMS  --------------------------------------------------------------------------------  Gen: No weight changes, fatigue, fevers/chills, +weakness  Head/Eyes/Ears/Mouth: No headache; Normal hearing; Normal vision    Respiratory: No dyspnea, cough, wheezing, hemoptysis  CV: No chest pain, PND, orthopnea  GI: + abdominal pain over lani surgery site, No  diarrhea, constipation, nausea, vomiting, melena, hematochezia  : No increased frequency, dysuria, hematuria, nocturia  MSK: No joint pain/swelling; no back pain; no edema   Heme: No easy bruising or bleeding  All other systems were reviewed and are negative, except as noted.      VITALS/PHYSICAL EXAM  --------------------------------------------------------------------------------  T(C): 36.3 (10-26-19 @ 13:00), Max: 37 (10-26-19 @ 09:00)  HR: 81 (10-26-19 @ 13:00) (81 - 102)  BP: 165/82 (10-26-19 @ 13:00) (115/70 - 173/95)  RR: 16 (10-26-19 @ 13:00) (16 - 20)  SpO2: 96% (10-26-19 @ 13:00) (96% - 98%)  Wt(kg): --        10-25-19 @ 07:01  -  10-26-19 @ 07:00  --------------------------------------------------------  IN: 1480 mL / OUT: 785 mL / NET: 695 mL    10-26-19 @ 07:01  -  10-26-19 @ 15:21  --------------------------------------------------------  IN: 730 mL / OUT: 310 mL / NET: 420 mL      Physical Exam:  PHYSICAL EXAM: vital signs as above  in no apparent distress  Neck: Supple, no JVD,    Lungs: no rhonchi, no wheeze, no crackles  CVS: S1 S2 no M/R/G  Abdomen: Mild discomfort over the TX< +RONALD drain,    no organomegaly, BS present  Neuro: Grossly intact  Skin: warm, dry  Ext: no cyanosis or clubbing, no edema  Access: left arm AVF+      LABS/STUDIES  --------------------------------------------------------------------------------              8.1    8.64  >-----------<  165      [10-26-19 @ 12:09]              24.5     136  |  92  |  86  ----------------------------<  253      [10-26-19 @ 12:09]  5.2   |  22  |  8.55        Ca     7.7     [10-26-19 @ 12:09]      Mg     2.2     [10-26-19 @ 06:17]      Phos  7.4     [10-26-19 @ 06:17]    TPro  5.6  /  Alb  3.1  /  TBili  0.2  /  DBili  x   /  AST  17  /  ALT  <5  /  AlkPhos  127  [10-26-19 @ 06:17]          Creatinine Trend:  SCr 8.55 [10-26 @ 12:09]  SCr 8.21 [10-26 @ 06:17]  SCr 6.14 [10-25 @ 06:31]  SCr 8.32 [10-24 @ 13:58]  SCr 7.99 [10-24 @ 06:14]        HbA1c 6.5      [10-23-19 @ 08:37]    HBsAg Nonreact      [10-22-19 @ 09:21]  HCV 0.10, Nonreact      [10-22-19 @ 09:21]

## 2019-10-26 NOTE — PROGRESS NOTE ADULT - PROBLEM SELECTOR PLAN 3
Hyperkalemia 2/2 DGF post renal transplant, K 7.3 to 4.2 s/p HD 10/22, 10/24    hd today  - monitor K, strict I/O  - low K diet, jessika mckenzie

## 2019-10-26 NOTE — PROGRESS NOTE ADULT - ASSESSMENT
39M with PMHx of IDDM (2000) on insulin since 2011, ESRD on HD M/W/F since 2013 (last HD Monday 10/22) Via Left forearm AVF, HTN (2013), HLD, Retinopathy,  Cataract surgery and laser eye surgery s/p HCV+ DDRT with Simulect induction on 10/22.     [] s/p DDRT 10/22/19 c/b DGF  - Urine output slowly improving; creat rising, K 5.5  - Will repeat BMP at noon, may need HD today  - Immuno: Envarsus 8mg daily, MMF 1000mg BID, Steroid taper, Simulect today  - Ppx: Nystatin, Bactrim, Valcyte  - Diet: Diabetic  - Pain: tylenol/toradol  - Remove hightower this am, RONALD in the afternoon  - Pepcid for GI ppx  - Hold lasix gtt     [] DM  - Lantus 10u qhs, Lispro 6u with meals    [] HTN  - cont Nifedipine XL 30mg daily    [] Dispo:   - Likely Monday   - May need HD on dc

## 2019-10-26 NOTE — PROGRESS NOTE ADULT - ASSESSMENT
39M PMHx ESRD 2/2 DM on HD (last dialyzed 10/22 via LUE AVF), IDDM2 (dx 2000) c/b retinopathy, HTN (2013), HLD admitted for HCV+ DDRT with Simulect induction.    s/p HD 10/23, 10/25 for DGF and hyperK 5.5    Recipient   ABO O type, CPRA: 0%   CMV/EBV Status: CMV +, EBV +        Donor (import?):  Donor ID: [id number]   Match:  [id number] then local match [id number]   OPO: [facility] – [facility]RIK Otto   Age: 41   ABO:  O   KDPI: 44%   COD:  Anoxia/Seizure   X Clamp Time  - 10/21/2019 – 04:53   Medical Hx:  ex- IVDU on methadone, kidney stones, UTI, ureter stent, HTN during pregnancy, smoker, anti - HCV + , HCV ARCADIO + , HBsAb +   Terminal Creatinine – 0.5   CIT: 30 hours  CMV +/EBV +   Kidney ETA to [facility]: 10/22/19 at 0600   Kidney Laterality   -Left

## 2019-10-26 NOTE — PROGRESS NOTE ADULT - PROBLEM SELECTOR PLAN 1
s/p HCV+ DDRT, simulect induction   POD#  4.    s/p HD 10/22 and 10/24  repeat renal u/s show good blood flow thru renal artery/vein    - Currently with rising BUN/Cr and marginally up K. U/O improved to 700 cc  -Will schedule for HD today with minimal UF     - c/w immunosuppression: Envarsus 4, Cellcept 1000 BID, Methyprednisolone taper  - c/w ppx: bactrim/valcyte/nystatin/pepcid  - check daily Tacrolimus trough 30 minutes before morning dose (goal 8-10)  - c/w Aspirin 81 (for renal u/s showing decreased flow in lower pole of kidney)  - trend SCr, strict I/O  - renally dose medications

## 2019-10-27 LAB
ALBUMIN SERPL ELPH-MCNC: 3.3 G/DL — SIGNIFICANT CHANGE UP (ref 3.3–5)
ALP SERPL-CCNC: 120 U/L — SIGNIFICANT CHANGE UP (ref 40–120)
ALT FLD-CCNC: <5 U/L — LOW (ref 10–45)
ANION GAP SERPL CALC-SCNC: 16 MMOL/L — SIGNIFICANT CHANGE UP (ref 5–17)
AST SERPL-CCNC: 15 U/L — SIGNIFICANT CHANGE UP (ref 10–40)
BILIRUB SERPL-MCNC: 0.3 MG/DL — SIGNIFICANT CHANGE UP (ref 0.2–1.2)
BUN SERPL-MCNC: 53 MG/DL — HIGH (ref 7–23)
CALCIUM SERPL-MCNC: 7.7 MG/DL — LOW (ref 8.4–10.5)
CHLORIDE SERPL-SCNC: 94 MMOL/L — LOW (ref 96–108)
CO2 SERPL-SCNC: 26 MMOL/L — SIGNIFICANT CHANGE UP (ref 22–31)
CREAT SERPL-MCNC: 5.96 MG/DL — HIGH (ref 0.5–1.3)
GLUCOSE BLDC GLUCOMTR-MCNC: 124 MG/DL — HIGH (ref 70–99)
GLUCOSE BLDC GLUCOMTR-MCNC: 124 MG/DL — HIGH (ref 70–99)
GLUCOSE BLDC GLUCOMTR-MCNC: 175 MG/DL — HIGH (ref 70–99)
GLUCOSE BLDC GLUCOMTR-MCNC: 203 MG/DL — HIGH (ref 70–99)
GLUCOSE BLDC GLUCOMTR-MCNC: 228 MG/DL — HIGH (ref 70–99)
GLUCOSE SERPL-MCNC: 250 MG/DL — HIGH (ref 70–99)
HCT VFR BLD CALC: 24.9 % — LOW (ref 39–50)
HGB BLD-MCNC: 8.1 G/DL — LOW (ref 13–17)
MAGNESIUM SERPL-MCNC: 2.1 MG/DL — SIGNIFICANT CHANGE UP (ref 1.6–2.6)
MCHC RBC-ENTMCNC: 30 PG — SIGNIFICANT CHANGE UP (ref 27–34)
MCHC RBC-ENTMCNC: 32.5 GM/DL — SIGNIFICANT CHANGE UP (ref 32–36)
MCV RBC AUTO: 92.2 FL — SIGNIFICANT CHANGE UP (ref 80–100)
NRBC # BLD: 0 /100 WBCS — SIGNIFICANT CHANGE UP (ref 0–0)
PHOSPHATE SERPL-MCNC: 5.2 MG/DL — HIGH (ref 2.5–4.5)
PLATELET # BLD AUTO: 179 K/UL — SIGNIFICANT CHANGE UP (ref 150–400)
POTASSIUM SERPL-MCNC: 4.4 MMOL/L — SIGNIFICANT CHANGE UP (ref 3.5–5.3)
POTASSIUM SERPL-SCNC: 4.4 MMOL/L — SIGNIFICANT CHANGE UP (ref 3.5–5.3)
PROT SERPL-MCNC: 5.7 G/DL — LOW (ref 6–8.3)
RBC # BLD: 2.7 M/UL — LOW (ref 4.2–5.8)
RBC # FLD: 13.4 % — SIGNIFICANT CHANGE UP (ref 10.3–14.5)
SODIUM SERPL-SCNC: 136 MMOL/L — SIGNIFICANT CHANGE UP (ref 135–145)
TACROLIMUS SERPL-MCNC: 2.1 NG/ML — SIGNIFICANT CHANGE UP
WBC # BLD: 6.07 K/UL — SIGNIFICANT CHANGE UP (ref 3.8–10.5)
WBC # FLD AUTO: 6.07 K/UL — SIGNIFICANT CHANGE UP (ref 3.8–10.5)

## 2019-10-27 PROCEDURE — 99232 SBSQ HOSP IP/OBS MODERATE 35: CPT

## 2019-10-27 RX ORDER — FUROSEMIDE 40 MG
60 TABLET ORAL
Refills: 0 | Status: DISCONTINUED | OUTPATIENT
Start: 2019-10-27 | End: 2019-10-28

## 2019-10-27 RX ADMIN — Medication 500000 UNIT(S): at 17:02

## 2019-10-27 RX ADMIN — Medication 81 MILLIGRAM(S): at 11:29

## 2019-10-27 RX ADMIN — Medication 60 MILLIGRAM(S): at 09:59

## 2019-10-27 RX ADMIN — Medication 60 MILLIGRAM(S): at 22:23

## 2019-10-27 RX ADMIN — Medication 500000 UNIT(S): at 06:03

## 2019-10-27 RX ADMIN — INSULIN GLARGINE 12 UNIT(S): 100 INJECTION, SOLUTION SUBCUTANEOUS at 22:22

## 2019-10-27 RX ADMIN — TRAMADOL HYDROCHLORIDE 25 MILLIGRAM(S): 50 TABLET ORAL at 02:18

## 2019-10-27 RX ADMIN — Medication 10 MILLIGRAM(S): at 06:03

## 2019-10-27 RX ADMIN — Medication 30 MILLIGRAM(S): at 06:03

## 2019-10-27 RX ADMIN — Medication 10 MILLIGRAM(S): at 11:33

## 2019-10-27 RX ADMIN — Medication 4: at 08:23

## 2019-10-27 RX ADMIN — VALGANCICLOVIR 450 MILLIGRAM(S): 450 TABLET, FILM COATED ORAL at 11:28

## 2019-10-27 RX ADMIN — Medication 8 UNIT(S): at 08:24

## 2019-10-27 RX ADMIN — Medication 2: at 19:37

## 2019-10-27 RX ADMIN — Medication 8 UNIT(S): at 13:38

## 2019-10-27 RX ADMIN — CHLORHEXIDINE GLUCONATE 1 APPLICATION(S): 213 SOLUTION TOPICAL at 11:30

## 2019-10-27 RX ADMIN — SENNA PLUS 2 TABLET(S): 8.6 TABLET ORAL at 22:22

## 2019-10-27 RX ADMIN — Medication 10 MILLIGRAM(S): at 17:01

## 2019-10-27 RX ADMIN — INSULIN GLARGINE 12 UNIT(S): 100 INJECTION, SOLUTION SUBCUTANEOUS at 00:13

## 2019-10-27 RX ADMIN — CHLORHEXIDINE GLUCONATE 1 APPLICATION(S): 213 SOLUTION TOPICAL at 06:03

## 2019-10-27 RX ADMIN — Medication 500000 UNIT(S): at 11:28

## 2019-10-27 RX ADMIN — TACROLIMUS 10 MILLIGRAM(S): 5 CAPSULE ORAL at 06:03

## 2019-10-27 RX ADMIN — Medication 500000 UNIT(S): at 00:13

## 2019-10-27 RX ADMIN — Medication 1 TABLET(S): at 11:28

## 2019-10-27 RX ADMIN — TRAMADOL HYDROCHLORIDE 25 MILLIGRAM(S): 50 TABLET ORAL at 02:56

## 2019-10-27 RX ADMIN — Medication 8 UNIT(S): at 19:37

## 2019-10-27 RX ADMIN — MYCOPHENOLATE MOFETIL 1000 MILLIGRAM(S): 250 CAPSULE ORAL at 06:03

## 2019-10-27 RX ADMIN — MYCOPHENOLATE MOFETIL 1000 MILLIGRAM(S): 250 CAPSULE ORAL at 17:01

## 2019-10-27 RX ADMIN — FAMOTIDINE 20 MILLIGRAM(S): 10 INJECTION INTRAVENOUS at 11:28

## 2019-10-27 NOTE — PROGRESS NOTE ADULT - PROBLEM SELECTOR PLAN 1
s/p HCV+ DDRT, simulect induction   POD#  5.    s/p HD 10/22, 10/24, 10/26  repeat renal u/s show good blood flow thru renal artery/vein    - Underwent HD yesterday. Made close to 900 cc of urine    -Monitor today on lasix    - c/w immunosuppression, tacrolimus dose increased as levels remain below goal    - c/w ppx: bactrim/valcyte/nystatin/pepcid  - check daily Tacrolimus trough 30 minutes before morning dose (goal 8-10)  - c/w Aspirin 81 (for renal u/s showing decreased flow in lower pole of kidney)  - trend SCr, strict I/O  - renally dose medications

## 2019-10-27 NOTE — PROGRESS NOTE ADULT - SUBJECTIVE AND OBJECTIVE BOX
[facility] DIVISION OF KIDNEY DISEASES AND HYPERTENSION -- FOLLOW UP NOTE  --------------------------------------------------------------------------------  Chief Complaint:DDTx    24 hour events/subjective:  underwent HD yesterday: uneventful  Feels well this am         PAST HISTORY  --------------------------------------------------------------------------------  No significant changes to PMH, PSH, FHx, SHx, unless otherwise noted    ALLERGIES & MEDICATIONS  --------------------------------------------------------------------------------  Allergies    No Known Allergies    Intolerances      Standing Inpatient Medications  aspirin enteric coated 81 milliGRAM(s) Oral daily  bisacodyl Suppository 10 milliGRAM(s) Rectal once  chlorhexidine 2% Cloths 1 Application(s) Topical daily  chlorhexidine 4% Liquid 1 Application(s) Topical <User Schedule>  dextrose 5%. 1000 milliLiter(s) IV Continuous <Continuous>  dextrose 50% Injectable 12.5 Gram(s) IV Push once  dextrose 50% Injectable 25 Gram(s) IV Push once  dextrose 50% Injectable 25 Gram(s) IV Push once  famotidine    Tablet 20 milliGRAM(s) Oral daily  furosemide   Injectable 60 milliGRAM(s) IV Push two times a day  insulin glargine Injectable (LANTUS) 12 Unit(s) SubCutaneous at bedtime  insulin lispro (HumaLOG) corrective regimen sliding scale   SubCutaneous three times a day before meals  insulin lispro (HumaLOG) corrective regimen sliding scale   SubCutaneous at bedtime  insulin lispro Injectable (HumaLOG) 8 Unit(s) SubCutaneous three times a day before meals  mycophenolate mofetil 1000 milliGRAM(s) Oral two times a day  NIFEdipine XL 30 milliGRAM(s) Oral daily  nystatin    Suspension 293529 Unit(s) Swish and Swallow four times a day  predniSONE   Tablet 10 milliGRAM(s) Oral two times a day  senna 2 Tablet(s) Oral at bedtime  tacrolimus ER Tablet (ENVARSUS XR) 10 milliGRAM(s) Oral daily  trimethoprim   80 mG/sulfamethoxazole 400 mG 1 Tablet(s) Oral daily  valGANciclovir 450 milliGRAM(s) Oral daily    PRN Inpatient Medications  acetaminophen   Tablet .. 650 milliGRAM(s) Oral every 6 hours PRN  dextrose 40% Gel 15 Gram(s) Oral once PRN  glucagon  Injectable 1 milliGRAM(s) IntraMuscular once PRN  naloxone Injectable 0.1 milliGRAM(s) IV Push every 3 minutes PRN  ondansetron Injectable 4 milliGRAM(s) IV Push every 6 hours PRN  ondansetron Injectable 4 milliGRAM(s) IV Push every 6 hours PRN  traMADol 25 milliGRAM(s) Oral every 6 hours PRN      REVIEW OF SYSTEMS  --------------------------------------------------------------------------------  Gen: No weight changes, fatigue, fevers/chills, weakness  Head/Eyes/Ears/Mouth: No headache; Normal hearing; Normal vision    Respiratory: No dyspnea, cough, wheezing, hemoptysis  CV: No chest pain, PND, orthopnea  GI: No abdominal pain, diarrhea, constipation, nausea, vomiting, melena, hematochezia  : No increased frequency, dysuria, hematuria, nocturia  MSK: No joint pain/swelling; no back pain; no edema   Heme: No easy bruising or bleeding  All other systems were reviewed and are negative, except as noted.      VITALS/PHYSICAL EXAM  --------------------------------------------------------------------------------  T(C): 36.9 (10-27-19 @ 09:00), Max: 37.1 (10-27-19 @ 01:00)  HR: 100 (10-27-19 @ 09:00) (81 - 100)  BP: 149/86 (10-27-19 @ 09:00) (142/82 - 165/82)  RR: 18 (10-27-19 @ 09:00) (16 - 18)  SpO2: 99% (10-27-19 @ 09:00) (96% - 100%)  Wt(kg): --        10-26-19 @ 07:01  -  10-27-19 @ 07:00  --------------------------------------------------------  IN: 1630 mL / OUT: 910 mL / NET: 720 mL    10-27-19 @ 07:01  -  10-27-19 @ 11:19  --------------------------------------------------------  IN: 240 mL / OUT: 175 mL / NET: 65 mL      Physical Exam:  PHYSICAL EXAM: vital signs as above  in no apparent distress  Neck: Supple, no JVD,    Lungs: no rhonchi, no wheeze, no crackles  CVS: S1 S2 no M/R/G  Abdomen: tX rt LQ, no tenderness, no organomegaly, BS present  Neuro: Grossly intact  Skin: warm, dry  Ext: no cyanosis or clubbing, no edema  Access:Lt arm AVF+      LABS/STUDIES  --------------------------------------------------------------------------------              8.1    6.07  >-----------<  179      [10-27-19 @ 06:42]              24.9     136  |  94  |  53  ----------------------------<  250      [10-27-19 @ 06:41]  4.4   |  26  |  5.96        Ca     7.7     [10-27-19 @ 06:41]      Mg     2.1     [10-27-19 @ 06:41]      Phos  5.2     [10-27-19 @ 06:41]    TPro  5.7  /  Alb  3.3  /  TBili  0.3  /  DBili  x   /  AST  15  /  ALT  <5  /  AlkPhos  120  [10-27-19 @ 06:41]          Creatinine Trend:  SCr 5.96 [10-27 @ 06:41]  SCr 8.55 [10-26 @ 12:09]  SCr 8.21 [10-26 @ 06:17]  SCr 6.14 [10-25 @ 06:31]  SCr 8.32 [10-24 @ 13:58]        HbA1c 6.5      [10-23-19 @ 08:37]    HBsAg Nonreact      [10-22-19 @ 09:21]  HCV 0.10, Nonreact      [10-22-19 @ 09:21]

## 2019-10-27 NOTE — PROGRESS NOTE ADULT - SUBJECTIVE AND OBJECTIVE BOX
Transplant Surgery - Multidisciplinary Rounds  --------------------------------------------------------------  HCV + DDRT 10/22/2019    Present: Patient seen with multidisciplinary team including Transplant Surgeon: , NP Quita Shearer am rounds and examined with Dr. Duffy.  Disciplines not in attendance will be notified of the plan.     HPI: 39M with PMHx of IDDM () on insulin since , ESRD on HD M/W/F since  (last HD Monday 10/22) Via Left forearm AVF, HTN (), HLD, Retinopathy,  Cataract surgery and laser eye surgery s/p HCV+ DDRT with Simulect induction on 10/22. ureter stented.  Post op course c/b DGF requiring HD.     Donor information:   UNOS ID: [id number]  Match:  [id number] then local match [id number]  OPO: [facility] – AMRIK Otto  ABO:  O  KDPI: 44%  Cold Ischemia time: 30 hrs  CMV +/EBV +    OR:  donor renal transplant to right iliac vessels  Two arteries, anastomosed separately, single vein, Extramedullary renal pelvis with single ureter  Placement of double J ureteral stent    Interval Events: POD 5 s/p DDRT c/b DGF  - Last HD on 10/26, K 4.4, u/o 900 (improving)   - Afebrile, VSS, pain well controlled, no shortness of breath, lungs clear  - Passed RONALD ARENAS d/cd yesterday       Potential Discharge date: pending clinical improvement/likely Monday     Education:  Medications    Plan of care:  See Below    MEDICATIONS  (STANDING):  aspirin enteric coated 81 milliGRAM(s) Oral daily  chlorhexidine 2% Cloths 1 Application(s) Topical daily  chlorhexidine 4% Liquid 1 Application(s) Topical <User Schedule>  dextrose 5%. 1000 milliLiter(s) (50 mL/Hr) IV Continuous <Continuous>  dextrose 50% Injectable 12.5 Gram(s) IV Push once  dextrose 50% Injectable 25 Gram(s) IV Push once  dextrose 50% Injectable 25 Gram(s) IV Push once  famotidine    Tablet 20 milliGRAM(s) Oral daily  furosemide   Injectable 60 milliGRAM(s) IV Push two times a day  insulin glargine Injectable (LANTUS) 12 Unit(s) SubCutaneous at bedtime  insulin lispro (HumaLOG) corrective regimen sliding scale   SubCutaneous three times a day before meals  insulin lispro (HumaLOG) corrective regimen sliding scale   SubCutaneous at bedtime  insulin lispro Injectable (HumaLOG) 8 Unit(s) SubCutaneous three times a day before meals  mycophenolate mofetil 1000 milliGRAM(s) Oral two times a day  NIFEdipine XL 30 milliGRAM(s) Oral daily  nystatin    Suspension 334595 Unit(s) Swish and Swallow four times a day  predniSONE   Tablet 10 milliGRAM(s) Oral two times a day  senna 2 Tablet(s) Oral at bedtime  tacrolimus ER Tablet (ENVARSUS XR) 10 milliGRAM(s) Oral daily  trimethoprim   80 mG/sulfamethoxazole 400 mG 1 Tablet(s) Oral daily  valGANciclovir 450 milliGRAM(s) Oral daily    MEDICATIONS  (PRN):  acetaminophen   Tablet .. 650 milliGRAM(s) Oral every 6 hours PRN Mild Pain (1 - 3), Moderate Pain (4 - 6)  dextrose 40% Gel 15 Gram(s) Oral once PRN Blood Glucose LESS THAN 70 milliGRAM(s)/deciliter  glucagon  Injectable 1 milliGRAM(s) IntraMuscular once PRN Glucose LESS THAN 70 milligrams/deciliter  naloxone Injectable 0.1 milliGRAM(s) IV Push every 3 minutes PRN For ANY of the following changes in patient status:  A. RR LESS THAN 10 breaths per minute, B. Oxygen saturation LESS THAN 90%, C. Sedation score of 6  ondansetron Injectable 4 milliGRAM(s) IV Push every 6 hours PRN Nausea and/or Vomiting  ondansetron Injectable 4 milliGRAM(s) IV Push every 6 hours PRN Nausea  traMADol 25 milliGRAM(s) Oral every 6 hours PRN Severe Pain (7 - 10)      PAST MEDICAL & SURGICAL HISTORY:  Anemia, unspecified type  DM (diabetes mellitus)  ESRD (end stage renal disease) on dialysis: M W,F  Hypercholesterolemia  HTN (hypertension)  AV fistula: L forearm  H/O eye surgery      Vital Signs Last 24 Hrs  T(C): 36.9 (27 Oct 2019 09:00), Max: 37.1 (27 Oct 2019 01:00)  T(F): 98.4 (27 Oct 2019 09:00), Max: 98.7 (27 Oct 2019 01:00)  HR: 100 (27 Oct 2019 09:00) (81 - 100)  BP: 149/86 (27 Oct 2019 09:00) (142/82 - 165/82)  BP(mean): --  RR: 18 (27 Oct 2019 09:00) (16 - 18)  SpO2: 99% (27 Oct 2019 09:00) (96% - 100%)    I&O's Summary    26 Oct 2019 07:01  -  27 Oct 2019 07:00  --------------------------------------------------------  IN: 1630 mL / OUT: 910 mL / NET: 720 mL    27 Oct 2019 07:01  -  27 Oct 2019 12:25  --------------------------------------------------------  IN: 480 mL / OUT: 325 mL / NET: 155 mL                              8.1    6.07  )-----------( 179      ( 27 Oct 2019 06:42 )             24.9     10-27    136  |  94<L>  |  53<H>  ----------------------------<  250<H>  4.4   |  26  |  5.96<H>    Ca    7.7<L>      27 Oct 2019 06:41  Phos  5.2     10-27  Mg     2.1     10-27    TPro  5.7<L>  /  Alb  3.3  /  TBili  0.3  /  DBili  x   /  AST  15  /  ALT  <5<L>  /  AlkPhos  120  10-27    Tacrolimus (), Serum: 2.1 ng/mL (10-27 @ 08:37)        Review of systems  Gen: No weight changes, fatigue, fevers/chills, weakness  Skin: No rashes  Head/Eyes/Ears/Mouth: No headache; Normal hearing; Normal vision w/o blurriness; No sinus pain/discomfort, sore throat  Respiratory: No dyspnea, cough, wheezing, hemoptysis  CV: No chest pain, PND, orthopnea  GI: C/O mild abdominal pain at surgical site, diarrhea, constipation, nausea, vomiting, melena, hematochezia  : No increased frequency, dysuria, hematuria, nocturia  MSK: No joint pain/swelling; no back pain; no edema  Neuro: No dizziness/lightheadedness, weakness, seizures, numbness, tingling  Heme: No easy bruising or bleeding  Endo: No heat/cold intolerance  Psych: No significant nervousness, anxiety, stress, depression  All other systems were reviewed and are negative, except as noted.      PHYSICAL EXAM:  Constitutional: Well developed / well nourished  Eyes: Anicteric, PERRLA  ENMT: nc/at  Neck: no JVD  Respiratory: CTA B/L  Cardiovascular: RRR  Gastrointestinal:  non distended, soft, incision c/d/i  Genitourinary: voiding spontaneously   Extremities: SCD's in place and working bilaterally  Vascular: Palpable dp pulses bilaterally  Neurological: A&O x3  Skin: incision c/d/i  Musculoskeletal: Moving all extremities  Psychiatric: Responsive

## 2019-10-27 NOTE — PROGRESS NOTE ADULT - ASSESSMENT
· Assessment		  39M with PMHx of IDDM (2000) on insulin since 2011, ESRD on HD M/W/F since 2013 (last HD Monday 10/22) Via Left forearm AVF, HTN (2013), HLD, Retinopathy,  Cataract surgery and laser eye surgery s/p HCV+ DDRT with Simulect induction on 10/22.     [] s/p DDRT 10/22/19 c/b DGF  - Urine output slowly improving;   -  HD as per nephrology last HD 10/26  - Immuno: Envarsus 8mg daily, MMF 1000mg BID, Steroid taper, Simulect today  - Ppx: Nystatin, Bactrim, Valcyte  - Diet: Diabetic  - Pain: tylenol/toradol  -I/Os   - Pepcid for GI ppx  - start lasix 60mg IVP BID    -bowel regimen     [] DM  - Lantus 10u qhs, Lispro 6u with meals    [] HTN  - cont Nifedipine XL 30mg daily    [] Dispo:   - Likely Monday   - May need HD on dc

## 2019-10-27 NOTE — PROGRESS NOTE ADULT - ASSESSMENT
39M PMHx ESRD 2/2 DM on HD (last dialyzed 10/22 via LUE AVF), IDDM2 (dx 2000) c/b retinopathy, HTN (2013), HLD admitted for HCV+ DDRT with Simulect induction.    s/p HD 10/23, 10/25 for DGF and hyperK 5.5    Recipient   ABO O type, CPRA: 0%   CMV/EBV Status: CMV +, EBV +        Donor (import?):  Donor ID: [id number]   Match:  [id number] then local match [id number]   OPO: [facility] – AMRIK Otto   Age: 41   ABO:  O   KDPI: 44%   COD:  Anoxia/Seizure   X Clamp Time  - 10/21/2019 – 04:53   Medical Hx:  ex- IVDU on methadone, kidney stones, UTI, ureter stent, HTN during pregnancy, smoker, anti - HCV + , HCV ARCADIO + , HBsAb +   Terminal Creatinine – 0.5   CIT: 30 hours  CMV +/EBV +   Kidney ETA to [facility]: 10/22/19 at 0600   Kidney Laterality   -Left

## 2019-10-27 NOTE — PROGRESS NOTE ADULT - PROBLEM SELECTOR PLAN 3
Hyperkalemia 2/2 DGF post renal transplant, K 7.3 to 4.2 s/p HD 10/22, 10/24    Resolved with HD yesterday   monitor K, strict I/O  low K diet, jessika mckenzie

## 2019-10-28 ENCOUNTER — TRANSCRIPTION ENCOUNTER (OUTPATIENT)
Age: 39
End: 2019-10-28

## 2019-10-28 VITALS
SYSTOLIC BLOOD PRESSURE: 143 MMHG | OXYGEN SATURATION: 99 % | HEART RATE: 94 BPM | DIASTOLIC BLOOD PRESSURE: 85 MMHG | TEMPERATURE: 98 F | RESPIRATION RATE: 18 BRPM

## 2019-10-28 DIAGNOSIS — Z76.89 PERSONS ENCOUNTERING HEALTH SERVICES IN OTHER SPECIFIED CIRCUMSTANCES: ICD-10-CM

## 2019-10-28 LAB
ALBUMIN SERPL ELPH-MCNC: 3.3 G/DL — SIGNIFICANT CHANGE UP (ref 3.3–5)
ALP SERPL-CCNC: 99 U/L — SIGNIFICANT CHANGE UP (ref 40–120)
ALT FLD-CCNC: 5 U/L — LOW (ref 10–45)
ANION GAP SERPL CALC-SCNC: 17 MMOL/L — SIGNIFICANT CHANGE UP (ref 5–17)
AST SERPL-CCNC: 14 U/L — SIGNIFICANT CHANGE UP (ref 10–40)
BILIRUB SERPL-MCNC: 0.2 MG/DL — SIGNIFICANT CHANGE UP (ref 0.2–1.2)
BUN SERPL-MCNC: 76 MG/DL — HIGH (ref 7–23)
CALCIUM SERPL-MCNC: 7.7 MG/DL — LOW (ref 8.4–10.5)
CHLORIDE SERPL-SCNC: 96 MMOL/L — SIGNIFICANT CHANGE UP (ref 96–108)
CO2 SERPL-SCNC: 24 MMOL/L — SIGNIFICANT CHANGE UP (ref 22–31)
CREAT SERPL-MCNC: 7.51 MG/DL — HIGH (ref 0.5–1.3)
GLUCOSE BLDC GLUCOMTR-MCNC: 110 MG/DL — HIGH (ref 70–99)
GLUCOSE BLDC GLUCOMTR-MCNC: 135 MG/DL — HIGH (ref 70–99)
GLUCOSE BLDC GLUCOMTR-MCNC: 90 MG/DL — SIGNIFICANT CHANGE UP (ref 70–99)
GLUCOSE SERPL-MCNC: 83 MG/DL — SIGNIFICANT CHANGE UP (ref 70–99)
HCT VFR BLD CALC: 22.4 % — LOW (ref 39–50)
HCT VFR BLD CALC: 25.6 % — LOW (ref 39–50)
HCV RNA SERPL NAA DL=5-ACNC: 332 IU/ML — HIGH
HCV RNA SPEC NAA+PROBE-LOG IU: 2.52 LOGIU/ML — HIGH
HGB BLD-MCNC: 7.6 G/DL — LOW (ref 13–17)
HGB BLD-MCNC: 8.5 G/DL — LOW (ref 13–17)
MAGNESIUM SERPL-MCNC: 2.2 MG/DL — SIGNIFICANT CHANGE UP (ref 1.6–2.6)
MCHC RBC-ENTMCNC: 30 PG — SIGNIFICANT CHANGE UP (ref 27–34)
MCHC RBC-ENTMCNC: 31.1 PG — SIGNIFICANT CHANGE UP (ref 27–34)
MCHC RBC-ENTMCNC: 33.2 GM/DL — SIGNIFICANT CHANGE UP (ref 32–36)
MCHC RBC-ENTMCNC: 33.9 GM/DL — SIGNIFICANT CHANGE UP (ref 32–36)
MCV RBC AUTO: 90.5 FL — SIGNIFICANT CHANGE UP (ref 80–100)
MCV RBC AUTO: 91.8 FL — SIGNIFICANT CHANGE UP (ref 80–100)
NRBC # BLD: 0 /100 WBCS — SIGNIFICANT CHANGE UP (ref 0–0)
NRBC # BLD: 0 /100 WBCS — SIGNIFICANT CHANGE UP (ref 0–0)
PHOSPHATE SERPL-MCNC: 6.5 MG/DL — HIGH (ref 2.5–4.5)
PLATELET # BLD AUTO: 169 K/UL — SIGNIFICANT CHANGE UP (ref 150–400)
PLATELET # BLD AUTO: 180 K/UL — SIGNIFICANT CHANGE UP (ref 150–400)
POTASSIUM SERPL-MCNC: 4.4 MMOL/L — SIGNIFICANT CHANGE UP (ref 3.5–5.3)
POTASSIUM SERPL-SCNC: 4.4 MMOL/L — SIGNIFICANT CHANGE UP (ref 3.5–5.3)
PROT SERPL-MCNC: 5.8 G/DL — LOW (ref 6–8.3)
RBC # BLD: 2.44 M/UL — LOW (ref 4.2–5.8)
RBC # BLD: 2.83 M/UL — LOW (ref 4.2–5.8)
RBC # FLD: 13.2 % — SIGNIFICANT CHANGE UP (ref 10.3–14.5)
RBC # FLD: 13.4 % — SIGNIFICANT CHANGE UP (ref 10.3–14.5)
SODIUM SERPL-SCNC: 137 MMOL/L — SIGNIFICANT CHANGE UP (ref 135–145)
TACROLIMUS SERPL-MCNC: 2.4 NG/ML — SIGNIFICANT CHANGE UP
WBC # BLD: 6.25 K/UL — SIGNIFICANT CHANGE UP (ref 3.8–10.5)
WBC # BLD: 6.41 K/UL — SIGNIFICANT CHANGE UP (ref 3.8–10.5)
WBC # FLD AUTO: 6.25 K/UL — SIGNIFICANT CHANGE UP (ref 3.8–10.5)
WBC # FLD AUTO: 6.41 K/UL — SIGNIFICANT CHANGE UP (ref 3.8–10.5)

## 2019-10-28 PROCEDURE — 99232 SBSQ HOSP IP/OBS MODERATE 35: CPT | Mod: GC

## 2019-10-28 RX ORDER — INSULIN DETEMIR 100/ML (3)
12 INSULIN PEN (ML) SUBCUTANEOUS
Qty: 0 | Refills: 0 | DISCHARGE

## 2019-10-28 RX ORDER — AMLODIPINE BESYLATE 2.5 MG/1
1 TABLET ORAL
Qty: 0 | Refills: 0 | DISCHARGE

## 2019-10-28 RX ORDER — HYDRALAZINE HCL 50 MG
1 TABLET ORAL
Qty: 0 | Refills: 0 | DISCHARGE

## 2019-10-28 RX ORDER — SENNA PLUS 8.6 MG/1
2 TABLET ORAL
Qty: 0 | Refills: 0 | DISCHARGE
Start: 2019-10-28

## 2019-10-28 RX ORDER — MYCOPHENOLATE MOFETIL 250 MG/1
2 CAPSULE ORAL
Qty: 120 | Refills: 0
Start: 2019-10-28 | End: 2019-11-26

## 2019-10-28 RX ORDER — FUROSEMIDE 40 MG
3 TABLET ORAL
Qty: 0 | Refills: 0 | DISCHARGE
Start: 2019-10-28

## 2019-10-28 RX ORDER — ACETAMINOPHEN 500 MG
2 TABLET ORAL
Qty: 0 | Refills: 0 | DISCHARGE
Start: 2019-10-28

## 2019-10-28 RX ORDER — NYSTATIN 500MM UNIT
5 POWDER (EA) MISCELLANEOUS
Qty: 0 | Refills: 0 | DISCHARGE
Start: 2019-10-28

## 2019-10-28 RX ORDER — NIFEDIPINE 30 MG
60 TABLET, EXTENDED RELEASE 24 HR ORAL DAILY
Refills: 0 | Status: DISCONTINUED | OUTPATIENT
Start: 2019-10-29 | End: 2019-10-28

## 2019-10-28 RX ORDER — INSULIN LISPRO 100/ML
6 VIAL (ML) SUBCUTANEOUS
Refills: 0 | Status: DISCONTINUED | OUTPATIENT
Start: 2019-10-28 | End: 2019-10-28

## 2019-10-28 RX ORDER — FUROSEMIDE 40 MG
60 TABLET ORAL
Refills: 0 | Status: DISCONTINUED | OUTPATIENT
Start: 2019-10-28 | End: 2019-10-28

## 2019-10-28 RX ORDER — MYCOPHENOLATE MOFETIL 250 MG/1
1000 CAPSULE ORAL
Qty: 0 | Refills: 0 | DISCHARGE
Start: 2019-10-28

## 2019-10-28 RX ORDER — TACROLIMUS 5 MG/1
12 CAPSULE ORAL DAILY
Refills: 0 | Status: DISCONTINUED | OUTPATIENT
Start: 2019-10-29 | End: 2019-10-28

## 2019-10-28 RX ORDER — INSULIN GLARGINE 100 [IU]/ML
10 INJECTION, SOLUTION SUBCUTANEOUS AT BEDTIME
Refills: 0 | Status: DISCONTINUED | OUTPATIENT
Start: 2019-10-28 | End: 2019-10-28

## 2019-10-28 RX ORDER — TACROLIMUS 5 MG/1
3 CAPSULE ORAL
Qty: 0 | Refills: 0 | DISCHARGE
Start: 2019-10-28

## 2019-10-28 RX ORDER — INSULIN ASPART 100 [IU]/ML
8 INJECTION, SOLUTION SUBCUTANEOUS
Qty: 0 | Refills: 0 | DISCHARGE

## 2019-10-28 RX ORDER — VALGANCICLOVIR 450 MG/1
1 TABLET, FILM COATED ORAL
Qty: 0 | Refills: 0 | DISCHARGE
Start: 2019-10-28

## 2019-10-28 RX ORDER — TACROLIMUS 5 MG/1
2 CAPSULE ORAL ONCE
Refills: 0 | Status: COMPLETED | OUTPATIENT
Start: 2019-10-28 | End: 2019-10-28

## 2019-10-28 RX ORDER — NIFEDIPINE 30 MG
30 TABLET, EXTENDED RELEASE 24 HR ORAL ONCE
Refills: 0 | Status: COMPLETED | OUTPATIENT
Start: 2019-10-28 | End: 2019-10-28

## 2019-10-28 RX ORDER — FAMOTIDINE 10 MG/ML
1 INJECTION INTRAVENOUS
Qty: 0 | Refills: 0 | DISCHARGE
Start: 2019-10-28

## 2019-10-28 RX ORDER — FUROSEMIDE 40 MG
4 TABLET ORAL
Qty: 0 | Refills: 0 | DISCHARGE
Start: 2019-10-28

## 2019-10-28 RX ORDER — CINACALCET 30 MG/1
1 TABLET, FILM COATED ORAL
Qty: 0 | Refills: 0 | DISCHARGE

## 2019-10-28 RX ORDER — NIFEDIPINE 30 MG
1 TABLET, EXTENDED RELEASE 24 HR ORAL
Qty: 0 | Refills: 0 | DISCHARGE
Start: 2019-10-28

## 2019-10-28 RX ADMIN — FAMOTIDINE 20 MILLIGRAM(S): 10 INJECTION INTRAVENOUS at 13:25

## 2019-10-28 RX ADMIN — Medication 6 UNIT(S): at 18:08

## 2019-10-28 RX ADMIN — Medication 500000 UNIT(S): at 13:16

## 2019-10-28 RX ADMIN — MYCOPHENOLATE MOFETIL 1000 MILLIGRAM(S): 250 CAPSULE ORAL at 18:02

## 2019-10-28 RX ADMIN — Medication 500000 UNIT(S): at 05:48

## 2019-10-28 RX ADMIN — VALGANCICLOVIR 450 MILLIGRAM(S): 450 TABLET, FILM COATED ORAL at 13:17

## 2019-10-28 RX ADMIN — Medication 60 MILLIGRAM(S): at 05:48

## 2019-10-28 RX ADMIN — Medication 8 UNIT(S): at 08:44

## 2019-10-28 RX ADMIN — Medication 650 MILLIGRAM(S): at 05:41

## 2019-10-28 RX ADMIN — Medication 5 MILLIGRAM(S): at 18:02

## 2019-10-28 RX ADMIN — CHLORHEXIDINE GLUCONATE 1 APPLICATION(S): 213 SOLUTION TOPICAL at 05:49

## 2019-10-28 RX ADMIN — Medication 500000 UNIT(S): at 18:02

## 2019-10-28 RX ADMIN — Medication 5 MILLIGRAM(S): at 05:48

## 2019-10-28 RX ADMIN — Medication 1 TABLET(S): at 13:16

## 2019-10-28 RX ADMIN — Medication 30 MILLIGRAM(S): at 05:48

## 2019-10-28 RX ADMIN — Medication 60 MILLIGRAM(S): at 18:02

## 2019-10-28 RX ADMIN — Medication 650 MILLIGRAM(S): at 04:52

## 2019-10-28 RX ADMIN — Medication 81 MILLIGRAM(S): at 13:25

## 2019-10-28 RX ADMIN — MYCOPHENOLATE MOFETIL 1000 MILLIGRAM(S): 250 CAPSULE ORAL at 05:48

## 2019-10-28 RX ADMIN — Medication 500000 UNIT(S): at 00:09

## 2019-10-28 RX ADMIN — TACROLIMUS 2 MILLIGRAM(S): 5 CAPSULE ORAL at 13:16

## 2019-10-28 RX ADMIN — Medication 8 UNIT(S): at 13:30

## 2019-10-28 RX ADMIN — Medication 30 MILLIGRAM(S): at 14:04

## 2019-10-28 RX ADMIN — TACROLIMUS 10 MILLIGRAM(S): 5 CAPSULE ORAL at 05:48

## 2019-10-28 NOTE — DISCHARGE NOTE PROVIDER - NSDCCPCAREPLAN_GEN_ALL_CORE_FT
PRINCIPAL DISCHARGE DIAGNOSIS  Diagnosis: Renal transplant recipient  Assessment and Plan of Treatment: No heavy lifting anything more than 10-15lbs or straining. Otherwise, you may return to your usual level of physical activity. If you are taking narcotic pain medication (such as Percocet), do NOT drive a car, operate machinery or make important decisions.  Call transplant clinic If you developed any of the following, fever, pain, redness, swelling at incision site, cough, nausea, vomiting, painful urination, difficulty urination, or not making any urine.  NOTIFY YOUR SURGEON IF: You have any bleeding that does not stop, any pus draining from your wound, any fever (over 100.4 F) or chills, persistent nausea/vomiting with inability to tolerate food or liquids, persistent diarrhea, or if your pain is not controlled on your discharge pain medications. PRINCIPAL DISCHARGE DIAGNOSIS  Diagnosis: Renal transplant recipient  Assessment and Plan of Treatment: No heavy lifting anything more than 10-15lbs or straining. Otherwise, you may return to your usual level of physical activity. If you are taking narcotic pain medication (such as Percocet), do NOT drive a car, operate machinery or make important decisions.  Call transplant clinic If you developed any of the following, fever, pain, redness, swelling at incision site, cough, nausea, vomiting, painful urination, difficulty urination, or not making any urine.  NOTIFY YOUR SURGEON IF: You have any bleeding that does not stop, any pus draining from your wound, any fever (over 100.4 F) or chills, persistent nausea/vomiting with inability to tolerate food or liquids, persistent diarrhea, or if your pain is not controlled on your discharge pain medications.      SECONDARY DISCHARGE DIAGNOSES  Diagnosis: Immunosuppression  Assessment and Plan of Treatment: Keep away from people who have cough, cold, and symptom of flu.  Only take medications that are on your discharge list  If you missed your medications call the transplant office, do not double up medication because you missed a dose.  If you have any question regarding your medication please call transplant office.    Diagnosis: Essential hypertension  Assessment and Plan of Treatment: Be sure to follow a low salt diet. If you have been prescribed antihypertensive medications to control your blood pressure, be sure to take them every day as prescribed and do not miss any doses, the medications do not work if they are not taken consistently. Follow up with your Primary Care Doctor and have your Blood Pressure checked regularly.       Diagnosis: Diabetes mellitus, type II, insulin dependent  Assessment and Plan of Treatment: Follow a low carb low sugar diet. Continue to take all antidiabetic medications/insulin as prescribed. Follow up with your Primary Care Doctor regularly for blood sugar/A1c checks. Be sure to see an eye doctor and foot doctor on an annual basis.

## 2019-10-28 NOTE — DIETITIAN INITIAL EVALUATION ADULT. - REASON INDICATOR FOR ASSESSMENT
Nutrition Assessment warranted for length of stay and status post kidney transplant.  Information obtained from: patient (during dialysis), medical record.   Per chart: 39M PMHx ESRD 2/2 DM on HD (last dialyzed 10/22 via LUE AVF), IDDM2 (dx 2000) c/b retinopathy, HTN (2013), HLD admitted for HCV+ DDRT with Simulect induction.    s/p HD 10/23, 10/25 for DGF and hyperK 5.5"

## 2019-10-28 NOTE — DIETITIAN INITIAL EVALUATION ADULT. - ETIOLOGY
increased physiologic demand of stress factor and surgical healing lack of prior education on post-transplant nutrition therapy and food safety guidelines

## 2019-10-28 NOTE — DISCHARGE NOTE PROVIDER - NSDCACTIVITY_GEN_ALL_CORE
Walking - Indoors allowed/No heavy lifting/straining/Showering allowed/Walking - Outdoors allowed/Stairs allowed

## 2019-10-28 NOTE — PROGRESS NOTE ADULT - PROBLEM SELECTOR PLAN 3
Resolved  Hyperkalemia 2/2 DGF post renal transplant, K 7.3 to 4.2 s/p HD 10/22, 10/24  - c/w lasix IV 60 BID  - monitor K, strict I/O  - low K diet, nepro shakes

## 2019-10-28 NOTE — PROGRESS NOTE ADULT - PROBLEM SELECTOR PROBLEM 5
Diabetes mellitus, type II, insulin dependent

## 2019-10-28 NOTE — DIETITIAN INITIAL EVALUATION ADULT. - PERTINENT MEDS FT
MEDICATIONS  (STANDING):  aspirin enteric coated 81 milliGRAM(s) Oral daily  chlorhexidine 2% Cloths 1 Application(s) Topical daily  chlorhexidine 4% Liquid 1 Application(s) Topical <User Schedule>  dextrose 5%. 1000 milliLiter(s) (50 mL/Hr) IV Continuous <Continuous>  dextrose 50% Injectable 12.5 Gram(s) IV Push once  dextrose 50% Injectable 25 Gram(s) IV Push once  dextrose 50% Injectable 25 Gram(s) IV Push once  famotidine    Tablet 20 milliGRAM(s) Oral daily  furosemide    Tablet 60 milliGRAM(s) Oral two times a day  insulin glargine Injectable (LANTUS) 12 Unit(s) SubCutaneous at bedtime  insulin lispro (HumaLOG) corrective regimen sliding scale   SubCutaneous three times a day before meals  insulin lispro (HumaLOG) corrective regimen sliding scale   SubCutaneous at bedtime  insulin lispro Injectable (HumaLOG) 8 Unit(s) SubCutaneous three times a day before meals  mycophenolate mofetil 1000 milliGRAM(s) Oral two times a day  nystatin    Suspension 277367 Unit(s) Swish and Swallow four times a day  predniSONE   Tablet   Oral   predniSONE   Tablet 5 milliGRAM(s) Oral two times a day  senna 2 Tablet(s) Oral at bedtime  tacrolimus ER Tablet (ENVARSUS XR) 2 milliGRAM(s) Oral once  trimethoprim   80 mG/sulfamethoxazole 400 mG 1 Tablet(s) Oral daily  valGANciclovir 450 milliGRAM(s) Oral daily

## 2019-10-28 NOTE — DIETITIAN INITIAL EVALUATION ADULT. - PHYSICAL APPEARANCE
other (specify)/well nourished/ht: 5 feet x inches, admit wt: xxx pounds, BMI: xx Kg/m2, IBW: xxx pounds (+/- 10%), x% IBW Edema: 1+ left/right foot  Skin: no pressure injuries noted per nursing flowsheet  Nutrition Focused Physical Exam: Pt appears well developed with no signs of muscle or fat depletion. other (specify)/ht: 5 feet 3 inches, admit wt: 126 pounds, BMI: 22.3 Kg/m2, IBW: 124 pounds (+/- 10%), 102% IBW/well nourished

## 2019-10-28 NOTE — DISCHARGE NOTE PROVIDER - CARE PROVIDER_API CALL
Michele Greenberg)  Surgery  Organ Transplant  [address] [place] [address]  Phone: [phone]  Fax: [phone]  Follow Up Time:     Jack Del Rio (DO)  Nephrology  [address], [place] [address]  Phone: [phone]  Fax: [phone]  Follow Up Time:     Westley Sinclair (MD)  Internal Medicine; Nephrology  [address] [place] [address]  Phone: [phone]  Fax: [phone]  Follow Up Time:

## 2019-10-28 NOTE — PROGRESS NOTE ADULT - PROBLEM SELECTOR PROBLEM 1
Renal transplant recipient

## 2019-10-28 NOTE — PHARMACY EDUCATION NOTE - MEDICATION SAFETY
Interactions/Miss dose instruction/Adherence/Herbals/Side effects/Signs and symptoms to report/Allergies/Medication precautions/Purpose/Storage and handling
Allergies/Side effects/Miss dose instruction/Purpose/Storage and handling/Adherence/Herbals/Interactions/Medication precautions/Signs and symptoms to report

## 2019-10-28 NOTE — PROGRESS NOTE ADULT - NSHPATTENDINGPLANDISCUSS_GEN_ALL_CORE
NP, nurse, nephrologist
PA, nurse, nephrologist
Patient seen on multidisciplinary rounds with Transplant surgeons, nephrologist, NP/PA, pharmacist, and nurse.
patient, transplant team and nursing team
patient, transplant team and nursing team
Transplant team
Transplant team
House staff, Transplant team
Transplant team
Transplant team

## 2019-10-28 NOTE — PROGRESS NOTE ADULT - ATTENDING COMMENTS
HD yesterday.  UOP increasing.  Will start lasix GTT.     Bear out tomorrow.     Immuno: tac goal 8-10, cellcept 1gm BID, steroid taper
s/p DDRT with DGF  HD Saturday.   Making significantly more urine (1500mL), but not clearing yet. HD today, then will follow as outpatient and assess  immunosuppression- Increase to envarsus 12mg daily, Cont prednisone taper, cellcept 1gm bid. received simulect  prophylaxis with nystatin, bactrim, valcyte  medication teaching  d/c home today after HD
s/p DDRT with DGF  HD yesterday. Lasix gtt d/c and started on lasix bid  campbell and RONALD removed  immunosuppression- Increase to envarsus 10mg daily, Cont prednisone taper, cellcept 1gm bid. received simulect  prophylaxis with nystatin, bactrim, valcyte  medication teaching
s/p DDRT with DGF  no significant urine output change with lasix gtt; made 600mL/24 hrs  hightower catheter removed this morning. monitor Post void residual  immunosuppression- envarsus 8mg daily, prednisone taper, cellcept 1gm bid. received simulect  prophylaxis with nystatin, bactrim, valcyte  plan for HD today while awaiting return of kidney function  medication teaching
D1 post DDRT  required HD overnight for hyperkalemia  diminished perfusion of lower pole of kidney (inferior artery patent)    Plan  routine postop  start 81mg ASA daily  Keep sBP >130
sp DDRTx.  DGF graft    Slowly improving graft function.     Immuno: tac goal 8-10, cellcept 1gm BID, steroid taper
Kidney recipient post op day 1, DGF, hyperkalemia s/p hemodialysis overnight.  Oliguric, starting to make urine  Reviewed immunosuppression and allograft function  Reviewed glycemic control and blood pressure control regimen  Plan: Monitor urine out put, electrolytes, creatinine  Tacrolimus target level 8-10 ng/ml  Will follow  I was present during and reviewed clinical and lab data as well as assessment and plan as documented by the house staff as noted. Please contact if any additional questions with any change in clinical condition or on availability of any additional information or reports.
Kidney recipient with DGF  Oliguric  Hyperkalemia  Hyperglycemia  DM, HTN  Plan:  Hemodialysis today  Tac dose adjusted  I have seen the patient and reviewed dialysis prescription and flow sheet. Dialysis access is functioning well. Dialysis prescription has been adjusted for optimized control of volume status, uremia and electrolytes. Management of additional metabolic abnormalities/anemia will continue to be addressed on follow up.  Will follow
s/p renal transplant with DGF secondary to ATN  UO improving but creatinine rising.  Will perform dialysis today then discharge  Reassess need for dialysis as outpatient.
Kidney recipient, DDRT, DGF, DM, HTN  reviewed immunosuppression and allograft function  Will continue to monitor for dialysis need  reviewed blood pressure control and glycemic control regimens  I was present during and reviewed clinical and lab data as well as assessment and plan as documented by the house staff as noted. Please contact if any additional questions with any change in clinical condition or on availability of any additional information or reports.  Will follow

## 2019-10-28 NOTE — PROGRESS NOTE ADULT - ASSESSMENT
39M PMHx ESRD 2/2 DM on HD (last dialyzed 10/22 via LUE AVF), IDDM2 (dx 2000) c/b retinopathy, HTN (2013), HLD admitted for HCV+ DDRT with Simulect induction.    s/p HD 10/23, 10/25 for DGF and hyperK 5.5    Recipient   ABO O type, CPRA: 0%   CMV/EBV Status: CMV +, EBV +   Last HD: Monday 10/21/19     Donor (import?):  Donor ID: [id number]   Match:  [id number] then local match [id number]   [facility] [place] [place]   Age: 41   ABO:  O   KDPI: 44%   COD:  Anoxia/Seizure   X Clamp Time  - 10/21/2019 – 04:53   Medical Hx:  ex- IVDU on methadone, kidney stones, UTI, ureter stent, HTN during pregnancy, smoker, anti - HCV + , HCV ARCADIO + , HBsAb +   Terminal Creatinine – 0.5   CIT: 30 hours  CMV +/EBV +   Kidney ETA to [facility]: 10/22/19 at 0600   Kidney Laterality   -Left

## 2019-10-28 NOTE — PROGRESS NOTE ADULT - PROBLEM SELECTOR PLAN 5
- c/w insulin sliding scale, adjust as needed  - monitor FS (goal glucose <180).
- c/w home insulin regimen w/ sliding scale, adjust as needed  - monitor FS (goal glucose <180)
- start home insulin regimen w/ sliding scale, adjust as needed  - monitor FS (goal glucose <180).

## 2019-10-28 NOTE — DIETITIAN INITIAL EVALUATION ADULT. - PERTINENT LABORATORY DATA
10-28 @ 06:13: Hemoglobin 7.6<L>, Hematocrit 22.4<L>  10-28 @ 06:11: Sodium 137, Potassium 4.4, Chloride 96, Calcium 7.7<L>, Magnesium 2.2, Phosphorus 6.5<H>, BUN 76<H>, Creatinine 7.51<H>, BG 83, Alk Phos 99, ALT/SGPT 5<L>, AST/SGOT 14, Total Protein 5.8<L>, Albumin 3.3,   HbA1c 6.5%  POCT Blood Glucose.: 90 mg/dL (28 Oct 2019 08:19)  POCT Blood Glucose.: 124 mg/dL (27 Oct 2019 22:17)  POCT Blood Glucose.: 175 mg/dL (27 Oct 2019 18:38)  POCT Blood Glucose.: 124 mg/dL (27 Oct 2019 13:35)

## 2019-10-28 NOTE — DISCHARGE NOTE PROVIDER - CARE PROVIDERS DIRECT ADDRESSES
,DirectAddress_Unknown,[email or web address]

## 2019-10-28 NOTE — DIETITIAN INITIAL EVALUATION ADULT. - OTHER INFO
INFORMATION PTA  Diet PTA: Pt reports good po intake PTA, previously following a Renal diet (on HD since 2013) and restricting carbohydrates.  Nutrition status PTA: Well nourished. Pt manages DM2 with Novolog 4 units tid, and Levemir 10 units at bedtime. Pt reports typical fingersticks of 80-120mg/dL; HbA1c 6.5% noted.  Nutrition Supplements PTA: Renvela tid, vit C 250mg, Vit D3  Food Allergies: NKFA  Weight History PTA: stable    INFORMATION THIS ADMISSION  Last BM: 10/27, 10/28  Urine Output x 24-hours: 1775ml  Last HD: 10/28 (receiving HD every other day for DGF and hyperkalemia)  Therapeutic Diet Education Provided:   Education Handouts Provided: INFORMATION PTA  Diet PTA: Pt reports good po intake PTA, previously following a Renal diet (on HD since 2013) and restricting carbohydrates.  Nutrition status PTA: Well nourished. Pt manages DM2 with Novolog 4 units tid, and Levemir 10 units at bedtime. Pt reports typical fingersticks of 80-120mg/dL; HbA1c 6.5% noted.  Nutrition Supplements PTA: Renvela tid, vit C 250mg, Vit D3  Food Allergies: NKFA  Weight History PTA: stable    INFORMATION THIS ADMISSION  Last BM: 10/27, 10/28  Urine Output x 24-hours: 1775ml  Last HD: 10/28 (receiving HD every other day for DGF and hyperkalemia)  Therapeutic Diet Education Provided: Reviewed post-transplant nutrition therapy and food safety guidelines for transplant recipients. Reviewed recommendations to avoid grapefruit, pomegranate and star fruit while taking immunosuppressant medication. Reviewed recommendations for moderate intake of sodium with transplant medications. Reviewed Consistent Carbohydrate diet, including carbohydrate content of foods and portion sizes. Pt is advised that BG management may be more challenging after transplant; follow up with outpatient MD/Endocrinologist is recommended. Pt was receptive and expressed understanding.   Education Handouts Provided: USDA Food Safety for Transplant Recipients booklet (English, plus [place] pamphlet), and Type 2 Diabetes Nutrition Therapy handout (English and [place]).

## 2019-10-28 NOTE — PROGRESS NOTE ADULT - PROVIDER SPECIALTY LIST ADULT
Anesthesia
Nephrology
Pharmacy
Transplant Medicine
Transplant Surgery
Anesthesia
Transplant Surgery
Transplant Surgery
Transplant Medicine
Nephrology

## 2019-10-28 NOTE — PROGRESS NOTE ADULT - SUBJECTIVE AND OBJECTIVE BOX
Transplant Surgery - Multidisciplinary Rounds  --------------------------------------------------------------  HCV + DDRT 10/22/2019    Present: Patient seen with multidisciplinary team including Transplant Surgeon: /Clive/Nery. Nephrologists: Yahir/Eliane. Pharmacy: [facility]. Inpatient: Manfred/Pedro Pablo/Nelson in am rounds and examined with Dr. uDffy.  Disciplines not in attendance will be notified of the plan.     HPI: 39M with PMHx of IDDM () on insulin since , ESRD on HD M/W/F since  (last HD Monday 10/22) Via Left forearm AVF, HTN (), HLD, Retinopathy,  Cataract surgery and laser eye surgery s/p HCV+ DDRT with Simulect induction on 10/22. ureter stented.  Post op course c/b DGF requiring HD.     Donor information:   SolexaOS ID: [id number]  Match:  [id number] then local match [id number]  OPO: [facility] [place]  ABO:  O  KDPI: 44%  Cold Ischemia time: 30 hrs  CMV +/EBV +    OR:  donor renal transplant to right iliac vessels  Two arteries, anastomosed separately, single vein, Extramedullary renal pelvis with single ureter  Placement of double J ureteral stent    Interval Events: POD 6 s/p DDRT c/b DGF  - Last HD on 10/26, K 4.4, Cr 7.5 from 6.  u/o 1.7L (improving)   - Afebrile, VSS, pain well controlled  - Tolerating diet, having bowel function. Ambulating without assistance    Potential Discharge date: home today    Education:  Medications    Plan of care:  See Below      MEDICATIONS  (STANDING):  aspirin enteric coated 81 milliGRAM(s) Oral daily  chlorhexidine 2% Cloths 1 Application(s) Topical daily  chlorhexidine 4% Liquid 1 Application(s) Topical <User Schedule>  dextrose 5%. 1000 milliLiter(s) (50 mL/Hr) IV Continuous <Continuous>  dextrose 50% Injectable 12.5 Gram(s) IV Push once  dextrose 50% Injectable 25 Gram(s) IV Push once  dextrose 50% Injectable 25 Gram(s) IV Push once  famotidine    Tablet 20 milliGRAM(s) Oral daily  furosemide    Tablet 60 milliGRAM(s) Oral two times a day  insulin glargine Injectable (LANTUS) 12 Unit(s) SubCutaneous at bedtime  insulin lispro (HumaLOG) corrective regimen sliding scale   SubCutaneous three times a day before meals  insulin lispro (HumaLOG) corrective regimen sliding scale   SubCutaneous at bedtime  insulin lispro Injectable (HumaLOG) 8 Unit(s) SubCutaneous three times a day before meals  mycophenolate mofetil 1000 milliGRAM(s) Oral two times a day  NIFEdipine XL 30 milliGRAM(s) Oral daily  nystatin    Suspension 325464 Unit(s) Swish and Swallow four times a day  predniSONE   Tablet   Oral   predniSONE   Tablet 5 milliGRAM(s) Oral two times a day  senna 2 Tablet(s) Oral at bedtime  tacrolimus ER Tablet (ENVARSUS XR) 10 milliGRAM(s) Oral daily  trimethoprim   80 mG/sulfamethoxazole 400 mG 1 Tablet(s) Oral daily  valGANciclovir 450 milliGRAM(s) Oral daily    MEDICATIONS  (PRN):  acetaminophen   Tablet .. 650 milliGRAM(s) Oral every 6 hours PRN Mild Pain (1 - 3), Moderate Pain (4 - 6)  dextrose 40% Gel 15 Gram(s) Oral once PRN Blood Glucose LESS THAN 70 milliGRAM(s)/deciliter  glucagon  Injectable 1 milliGRAM(s) IntraMuscular once PRN Glucose LESS THAN 70 milligrams/deciliter  naloxone Injectable 0.1 milliGRAM(s) IV Push every 3 minutes PRN For ANY of the following changes in patient status:  A. RR LESS THAN 10 breaths per minute, B. Oxygen saturation LESS THAN 90%, C. Sedation score of 6  ondansetron Injectable 4 milliGRAM(s) IV Push every 6 hours PRN Nausea and/or Vomiting  ondansetron Injectable 4 milliGRAM(s) IV Push every 6 hours PRN Nausea  traMADol 25 milliGRAM(s) Oral every 6 hours PRN Severe Pain (7 - 10)      PAST MEDICAL & SURGICAL HISTORY:  Anemia, unspecified type  DM (diabetes mellitus)  ESRD (end stage renal disease) on dialysis: M W,F  Hypercholesterolemia  HTN (hypertension)  AV fistula: L forearm  H/O eye surgery      Vital Signs Last 24 Hrs  T(C): 36.8 (28 Oct 2019 10:00), Max: 37.1 (27 Oct 2019 21:00)  T(F): 98.3 (28 Oct 2019 10:00), Max: 98.8 (28 Oct 2019 01:00)  HR: 108 (28 Oct 2019 10:00) (95 - 108)  BP: 166/100 (28 Oct 2019 10:00) (141/83 - 171/100)  BP(mean): --  RR: 18 (28 Oct 2019 10:00) (18 - 18)  SpO2: 99% (28 Oct 2019 10:00) (97% - 100%)    I&O's Summary    27 Oct 2019 07:  -  28 Oct 2019 07:00  --------------------------------------------------------  IN: 2040 mL / OUT: 1775 mL / NET: 265 mL    28 Oct 2019 07:01  -  28 Oct 2019 10:41  --------------------------------------------------------  IN: 180 mL / OUT: 175 mL / NET: 5 mL                              7.6    6.41  )-----------( 169      ( 28 Oct 2019 06:13 )             22.4     10-28    137  |  96  |  76<H>  ----------------------------<  83  4.4   |  24  |  7.51<H>    Ca    7.7<L>      28 Oct 2019 06:11  Phos  6.5     10-28  Mg     2.2     10-28    TPro  5.8<L>  /  Alb  3.3  /  TBili  0.2  /  DBili  x   /  AST  14  /  ALT  5<L>  /  AlkPhos  99  10-28    Tacrolimus (), Serum: 2.1 ng/mL (10-27 @ 08:37)                  Review of systems  Gen: No weight changes, fatigue, fevers/chills, weakness  Skin: No rashes  Head/Eyes/Ears/Mouth: No headache; Normal hearing; Normal vision w/o blurriness; No sinus pain/discomfort, sore throat  Respiratory: No dyspnea, cough, wheezing, hemoptysis  CV: No chest pain, PND, orthopnea  GI: C/O mild abdominal pain at surgical site, no diarrhea, constipation, nausea, vomiting, melena, hematochezia  : No increased frequency, dysuria, hematuria, nocturia  MSK: No joint pain/swelling; no back pain; no edema  Neuro: No dizziness/lightheadedness, weakness, seizures, numbness, tingling  Heme: No easy bruising or bleeding  Endo: No heat/cold intolerance  Psych: No significant nervousness, anxiety, stress, depression  All other systems were reviewed and are negative, except as noted.      PHYSICAL EXAM:  Constitutional: Well developed / well nourished  Eyes: Anicteric, PERRLA  ENMT: nc/at  Neck: no JVD  Respiratory: CTA B/L  Cardiovascular: RRR  Gastrointestinal:  non distended, soft, incision c/d/i  Genitourinary: voiding spontaneously   Extremities: SCD's in place and working bilaterally  Vascular: Palpable dp pulses bilaterally  Neurological: A&O x3  Skin: incision c/d/i  Musculoskeletal: Moving all extremities  Psychiatric: Responsive

## 2019-10-28 NOTE — PHARMACY EDUCATION NOTE - EDUCATION SUMMARY
Discharge immunosuppressant medications and prophylatic anti-infective agents reviewed with the patient. Outpatient medication schedule was discussed in detail including: medication name, indication, dose, administration times, treatment duration, side effects, drug interactions, and special instructions.     Patient questions and concerns were answered and addressed. Patient demonstrated understanding.
Discharge immunosuppressant medications and prophylatic anti-infective agents reviewed with the patient. Outpatient medication schedule was discussed in detail including: medication name, indication, dose, administration times, treatment duration, side effects, drug interactions, and special instructions. Patient questions and concerns were answered and addressed. Patient demonstrated understanding.

## 2019-10-28 NOTE — DISCHARGE NOTE NURSING/CASE MANAGEMENT/SOCIAL WORK - NSDCFUADDAPPT_GEN_ALL_CORE_FT
1. Please call to make a follow-up appointment with Dr. Greenberg and Dr. Del Rio on   2. Please follow up with your primary care physician in one week regarding your hospitalization.  3. Please follow up with your dentist as soon as possible regarding your bridge

## 2019-10-28 NOTE — PROGRESS NOTE ADULT - PROBLEM SELECTOR PLAN 1
s/p HCV+ DDRT, simulect induction POD# 0 and 4.  s/p HD 10/22 for hyperK 7.3.  repeat renal u/s show good blood flow thru renal artery/vein    - today plan for hemodialysis no fluid removal 3hr before discharge  - c/w immunosuppression: Envarsus 10, Cellcept 1000 BID, pred taper  - c/w ppx: bactrim/valcyte/nystatin/pepcid  - check daily Tacrolimus trough 30 minutes before morning dose (goal 8-10)  - trend SCr, strict I/O  - renally dose medications

## 2019-10-28 NOTE — DIETITIAN INITIAL EVALUATION ADULT. - ADD RECOMMEND
1) Continue Consistent Carbohydrate diet; encourage intake of high protein, nutrient dense foods. 2) Reinforce post-transplant nutrition therapy and food safety guidelines. 3) Discharge diet: Continue as above. Recommend follow up visit with Transplant MD and outpatient RD for dietary modifications as warranted.

## 2019-10-28 NOTE — DISCHARGE NOTE PROVIDER - HOSPITAL COURSE
39M with PMHx of IDDM () on insulin since , ESRD on HD M/W/F since  (last HD Monday 10/22) Via Left forearm AVF, HTN (), HLD, Retinopathy,  Cataract surgery and laser eye surgery s/p HCV+ DDRT with Simulect induction on 10/22, ureter stented.  Post op course c/b DGF requiring HD.           His last HD was on 10/28 before d/c.  D/c Cr pre-HD was 7.5.  Made ~2L UO daily.  He otherwise tolerated diet, ambulated well without assistance and had bowel function. His hightower was removed, passed TOV. RONALD was also removed.          He received final dose of Simulect. He was discharged to home on Envarsus , MMF 1gm BID, oral Prednisone taper and prophylactic agents Bactrim, Nystatin and Valcyte 450mg daily.  He was evaluated by the multi-disciplinary team including surgeon, nephrologist, ACPs, pharmacist, nutrition, social work, and nursing and deemed stable for discharge to home with close outpatient follow-up.        d/c plan:    -Envarsus    -MMF 1000BID    -Pred taper as ordered    -Nystatin/Bactrim/Valcyte/Pepcid  for PPx    -insulin/HTN regimen as ordered    -Lasix 60/60 POD    -f/u with Eliane Roach on Wednesday. outpatient HD need to be determined pending clinic eval    -f/u with Dental (bridge dislocated during extubation)    ---------------------------------------------------------    Donor information:     UNOS ID: [id number]    Match:  [id number] then local match [id number]    [facility]O: [facility]    ABO:  O    KDPI: 44%    Cold Ischemia time: 30 hrs    CMV +/EBV +        OR:  donor renal transplant to right iliac vessels    Two arteries, anastomosed separately, single vein, Extramedullary renal pelvis with single ureter    Placement of double J ureteral stent 39M with PMHx of IDDM () on insulin since , ESRD on HD M/W/F since  (last HD Monday 10/22) Via Left forearm AVF, HTN (), HLD, Retinopathy,  Cataract surgery and laser eye surgery s/p HCV+ DDRT with Simulect induction on 10/22, ureter stented.  Post op course c/b DGF requiring HD.           His last HD was on 10/28 before d/c.  D/c Cr pre-HD was 7.5.  Made ~2L UO daily.  He otherwise tolerated diet, ambulated well without assistance and had bowel function. His hightower was removed, passed TOV. RONALD was also removed.          He received final dose of Simulect. He was discharged to home on Envarsus 12mg , MMF 1gm BID, oral Prednisone taper and prophylactic agents Bactrim, Nystatin and Valcyte 450mg daily.  He was evaluated by the multi-disciplinary team including surgeon, nephrologist, ACPs, pharmacist, nutrition, social work, and nursing and deemed stable for discharge to home with close outpatient follow-up.        d/c plan:    -Envarsus 12 mg QD    -MMF 1000BID    -Pred taper as ordered    -Nystatin/Bactrim/Valcyte/Pepcid  for PPx    -insulin/HTN regimen as ordered    -Lasix 80/80 BID    -f/u with Eliane Roach on Wednesday. outpatient HD need to be determined pending clinic eval    -f/u with Dental (bridge dislocated during extubation)    ---------------------------------------------------------    Donor information:     UNOS ID: [id number]    Match:  [id number] then local match [id number]    OPO: [facility]    ABO:  O    KDPI: 44%    Cold Ischemia time: 30 hrs    CMV +/EBV +        OR:  donor renal transplant to right iliac vessels    Two arteries, anastomosed separately, single vein, Extramedullary renal pelvis with single ureter    Placement of double J ureteral stent

## 2019-10-28 NOTE — DISCHARGE NOTE PROVIDER - NSDCFUSCHEDAPPT_GEN_ALL_CORE_FT
BRENTON ROSADO ; 10/31/2019 ; [facility] [id number] BRENTON ROSADO ; 10/30/2019 ; [facility] [address] [facility] BRENTON Suazo ; 10/31/2019 ; [facility] [phone]

## 2019-10-28 NOTE — DISCHARGE NOTE NURSING/CASE MANAGEMENT/SOCIAL WORK - PATIENT PORTAL LINK FT
You can access the FollowMyHealth Patient Portal offered by [facility] by registering at the following website: [email or web address]. By joining [facility]’s FollowMyHealth portal, you will also be able to view your health information using other applications (apps) compatible with our system.

## 2019-10-28 NOTE — PROGRESS NOTE ADULT - ASSESSMENT
39M with PMHx of IDDM (2000) on insulin since 2011, ESRD on HD M/W/F since 2013 (last HD Monday 10/22) Via Left forearm AVF, HTN (2013), HLD, Retinopathy,  Cataract surgery and laser eye surgery s/p HCV+ DDRT with Simulect induction on 10/22.     [] s/p DDRT 10/22/19 c/b DGF  - Urine output slowly improving, Cr 7.5 today.  HD today.  - Immuno: Envarsus dosed daily, MMF 1000mg BID, Steroid taper, Simulect completed  - Ppx: Nystatin, Bactrim, Valcyte  - Diet: Diabetic  - Pain: tylenol/toradol  - I/Os   - Pepcid for GI ppx  - switch to Lasix 60/60 PO today  -bowel regimen     [] DM  - Lantus 12u qhs, Lispro 8u with meals    [] HTN  - cont Nifedipine    [] Dispo:   - home today after HD.  will discuss need for further HD as outpatient on Wed

## 2019-10-28 NOTE — DISCHARGE NOTE PROVIDER - PROVIDER TOKENS
PROVIDER:[TOKEN:[[id number]]],PROVIDER:[TOKEN:[[id number]]],PROVIDER:[TOKEN:[[id number]]]

## 2019-10-28 NOTE — PROGRESS NOTE ADULT - SUBJECTIVE AND OBJECTIVE BOX
[facility] DIVISION OF KIDNEY DISEASES AND HYPERTENSION -- FOLLOW UP NOTE  --------------------------------------------------------------------------------  Chief Complaint:    24 hour events/subjective:  - no overnight events, total UOP yesterday 1.7L voided  - pt seen and examined at bedside, report feels well overall mild abdominal sore only which improving      PAST HISTORY  --------------------------------------------------------------------------------  No significant changes to PMH, PSH, FHx, SHx, unless otherwise noted    ALLERGIES & MEDICATIONS  --------------------------------------------------------------------------------  Allergies    No Known Allergies    Intolerances      Standing Inpatient Medications  aspirin enteric coated 81 milliGRAM(s) Oral daily  chlorhexidine 2% Cloths 1 Application(s) Topical daily  chlorhexidine 4% Liquid 1 Application(s) Topical <User Schedule>  dextrose 5%. 1000 milliLiter(s) IV Continuous <Continuous>  dextrose 50% Injectable 12.5 Gram(s) IV Push once  dextrose 50% Injectable 25 Gram(s) IV Push once  dextrose 50% Injectable 25 Gram(s) IV Push once  famotidine    Tablet 20 milliGRAM(s) Oral daily  furosemide    Tablet 60 milliGRAM(s) Oral two times a day  insulin glargine Injectable (LANTUS) 12 Unit(s) SubCutaneous at bedtime  insulin lispro (HumaLOG) corrective regimen sliding scale   SubCutaneous three times a day before meals  insulin lispro (HumaLOG) corrective regimen sliding scale   SubCutaneous at bedtime  insulin lispro Injectable (HumaLOG) 8 Unit(s) SubCutaneous three times a day before meals  mycophenolate mofetil 1000 milliGRAM(s) Oral two times a day  NIFEdipine XL 30 milliGRAM(s) Oral daily  nystatin    Suspension 487304 Unit(s) Swish and Swallow four times a day  predniSONE   Tablet   Oral   predniSONE   Tablet 5 milliGRAM(s) Oral two times a day  senna 2 Tablet(s) Oral at bedtime  tacrolimus ER Tablet (ENVARSUS XR) 10 milliGRAM(s) Oral daily  trimethoprim   80 mG/sulfamethoxazole 400 mG 1 Tablet(s) Oral daily  valGANciclovir 450 milliGRAM(s) Oral daily    PRN Inpatient Medications  acetaminophen   Tablet .. 650 milliGRAM(s) Oral every 6 hours PRN  dextrose 40% Gel 15 Gram(s) Oral once PRN  glucagon  Injectable 1 milliGRAM(s) IntraMuscular once PRN  naloxone Injectable 0.1 milliGRAM(s) IV Push every 3 minutes PRN  ondansetron Injectable 4 milliGRAM(s) IV Push every 6 hours PRN  ondansetron Injectable 4 milliGRAM(s) IV Push every 6 hours PRN  traMADol 25 milliGRAM(s) Oral every 6 hours PRN      REVIEW OF SYSTEMS  Gen: No fatigue, fevers/chills, weakness  Respiratory: No dyspnea, cough, wheezing  CV: No chest pain, PND, orthopnea  GI: mild abdominal pain from surgical incision, pass gas and movement, nausea, vomiting, melena  : No dysuria, hematuria  MSK: no edema      VITALS/PHYSICAL EXAM  --------------------------------------------------------------------------------  T(C): 36.8 (10-28-19 @ 10:00), Max: 37.1 (10-27-19 @ 21:00)  HR: 108 (10-28-19 @ 10:00) (95 - 108)  BP: 166/100 (10-28-19 @ 10:00) (141/83 - 171/100)  RR: 18 (10-28-19 @ 10:00) (18 - 18)  SpO2: 99% (10-28-19 @ 10:00) (97% - 100%)  Wt(kg): --        10-27-19 @ 07:01  -  10-28-19 @ 07:00  --------------------------------------------------------  IN: 2040 mL / OUT: 1775 mL / NET: 265 mL    10-28-19 @ 07:01  -  10-28-19 @ 10:55  --------------------------------------------------------  IN: 180 mL / OUT: 175 mL / NET: 5 mL      Physical Exam:  	Gen: NAD, well-appearing on room air  	Pulm: CTA B/L, no crackles/wheezing  	CV: RRR, S1S2; no murmur  	Abd: +BS, soft, nontender/nondistended, incision c/d/i  	LE: Warm, no edema, LUE AVF +thrills    LABS/STUDIES  --------------------------------------------------------------------------------              7.6    6.41  >-----------<  169      [10-28-19 @ 06:13]              22.4     137  |  96  |  76  ----------------------------<  83      [10-28-19 @ 06:11]  4.4   |  24  |  7.51        Ca     7.7     [10-28-19 @ 06:11]      Mg     2.2     [10-28-19 @ 06:11]      Phos  6.5     [10-28-19 @ 06:11]    TPro  5.8  /  Alb  3.3  /  TBili  0.2  /  DBili  x   /  AST  14  /  ALT  5   /  AlkPhos  99  [10-28-19 @ 06:11]          Creatinine Trend:  SCr 7.51 [10-28 @ 06:11]  SCr 5.96 [10-27 @ 06:41]  SCr 8.55 [10-26 @ 12:09]  SCr 8.21 [10-26 @ 06:17]  SCr 6.14 [10-25 @ 06:31]        HbA1c 6.5      [10-23-19 @ 08:37]    HBsAg Nonreact      [10-22-19 @ 09:21]  HCV 0.10, Nonreact      [10-22-19 @ 09:21]

## 2019-10-28 NOTE — PROGRESS NOTE ADULT - PROBLEM SELECTOR PLAN 2
as stated above.

## 2019-10-28 NOTE — DISCHARGE NOTE PROVIDER - NSDCFUADDAPPT_GEN_ALL_CORE_FT
1. Please call to make a follow-up appointment with Dr. Greenberg and Dr. Del Rio on   2. Please follow up with your primary care physician in one week regarding your hospitalization.  3. Please follow up with your dentist as soon as possible regarding your decision 1. Please call to make a follow-up appointment with Dr. Greenberg and Dr. Del Rio on   2. Please follow up with your primary care physician in one week regarding your hospitalization.  3. Please follow up with your dentist as soon as possible regarding your bridge

## 2019-10-29 ENCOUNTER — INBOUND DOCUMENT (OUTPATIENT)
Age: 39
End: 2019-10-29

## 2019-10-30 ENCOUNTER — APPOINTMENT (OUTPATIENT)
Dept: TRANSPLANT | Facility: CLINIC | Age: 39
End: 2019-10-30
Payer: MEDICARE

## 2019-10-30 ENCOUNTER — APPOINTMENT (OUTPATIENT)
Dept: TRANSPLANT | Facility: CLINIC | Age: 39
End: 2019-10-30

## 2019-10-30 VITALS
OXYGEN SATURATION: 99 % | HEART RATE: 95 BPM | TEMPERATURE: 98 F | WEIGHT: 134 LBS | BODY MASS INDEX: 22.88 KG/M2 | HEIGHT: 64 IN | RESPIRATION RATE: 14 BRPM | SYSTOLIC BLOOD PRESSURE: 166 MMHG | DIASTOLIC BLOOD PRESSURE: 96 MMHG

## 2019-10-30 PROCEDURE — 99024 POSTOP FOLLOW-UP VISIT: CPT

## 2019-10-30 RX ORDER — PREDNISONE 5 MG/1
5 TABLET ORAL
Qty: 15 | Refills: 0 | Status: DISCONTINUED | COMMUNITY
Start: 2019-10-24 | End: 2019-10-30

## 2019-10-30 RX ORDER — TACROLIMUS 1 MG/1
1 TABLET, EXTENDED RELEASE ORAL DAILY
Qty: 270 | Refills: 3 | Status: DISCONTINUED | COMMUNITY
Start: 2019-10-24 | End: 2019-10-30

## 2019-10-30 NOTE — PHYSICAL EXAM
[Soft] : soft [Non-tender] : non-tender [Scars] : scars [Normal] : normal [] : right dorsalis pedis palpable [TextBox_25] : scar healed, no erythema [Clean] : clean [Dry] : dry [Healing Well] : healing well

## 2019-10-30 NOTE — HISTORY OF PRESENT ILLNESS
[ Donor] :  donor [Basiliximab] : basiliximab [Positive/Positive] : Donor Positive/Recipient Positive [PHS Increased Risk] : [facility] increased risk [Hepatitis C] : hepatitis c [ABO Incompatible] : ABO compatible [Brain Death] : brain death [Received on Pump] : organ received on pump [KDPI: ____] : Kidney Donor Profile Index: [unfilled] [Cold Ischemic Time: ____] : Cold Ischemic Time: [unfilled] [TextBox_34] : DGF requiring HD for hyperkalemia

## 2019-10-30 NOTE — PLAN
[FreeTextEntry1] : Bloods:\par K 4.9\par Cr 6.99\par Tacrolimus 4.5 (on 12mg envarsus)\par \par Plan\par does not require dialysis at the moment\par recheck bloods friday morning to decide if requires dialysis at that point\par increase envarsus to 16mg\par review next week with Dr Del Rio\par HCV treatment (viral load too low to genotype - this was rechecked) \par \par

## 2019-10-30 NOTE — REASON FOR VISIT
[Follow-Up] : a follow-up visit  [Spouse] : spouse [FreeTextEntry3] :  donor kidney transplant [FreeTextEntry5] : October 22, 2019

## 2019-10-30 NOTE — ASSESSMENT
[FreeTextEntry1] : Discharged 2 days ago.\par Doing well, voided 1L/24 hours\par Opening bowels (loose), on colace\par euvolemic\par No evidence of fluid overload\par \par

## 2019-10-31 ENCOUNTER — APPOINTMENT (OUTPATIENT)
Dept: CARDIOLOGY | Facility: CLINIC | Age: 39
End: 2019-10-31

## 2019-11-01 ENCOUNTER — LABORATORY RESULT (OUTPATIENT)
Age: 39
End: 2019-11-01

## 2019-11-01 ENCOUNTER — APPOINTMENT (OUTPATIENT)
Dept: TRANSPLANT | Facility: CLINIC | Age: 39
End: 2019-11-01

## 2019-11-01 ENCOUNTER — OTHER (OUTPATIENT)
Age: 39
End: 2019-11-01

## 2019-11-03 LAB
ALBUMIN SERPL ELPH-MCNC: 3.9 G/DL
ALP BLD-CCNC: 120 U/L
ALT SERPL-CCNC: 11 U/L
ANION GAP SERPL CALC-SCNC: 18 MMOL/L
APPEARANCE: ABNORMAL
AST SERPL-CCNC: 17 U/L
BACTERIA: ABNORMAL
BASOPHILS # BLD AUTO: 0.04 K/UL
BASOPHILS NFR BLD AUTO: 0.5 %
BILIRUB SERPL-MCNC: 0.3 MG/DL
BILIRUBIN URINE: NEGATIVE
BKV DNA SPEC QL NAA+PROBE: NOT DETECTED COPIES/ML
BLOOD URINE: ABNORMAL
BUN SERPL-MCNC: 70 MG/DL
CALCIUM SERPL-MCNC: 8.4 MG/DL
CHLORIDE SERPL-SCNC: 96 MMOL/L
CO2 SERPL-SCNC: 23 MMOL/L
COLOR: YELLOW
CREAT SERPL-MCNC: 6.99 MG/DL
CREAT SPEC-SCNC: 82 MG/DL
CREAT/PROT UR: 1.4 RATIO
EOSINOPHIL # BLD AUTO: 0.25 K/UL
EOSINOPHIL NFR BLD AUTO: 3.4 %
GLUCOSE QUALITATIVE U: NEGATIVE
GLUCOSE SERPL-MCNC: 138 MG/DL
HCT VFR BLD CALC: 25.3 %
HGB BLD-MCNC: 8.1 G/DL
HYALINE CASTS: 0 /LPF
IMM GRANULOCYTES NFR BLD AUTO: 0.5 %
KETONES URINE: NEGATIVE
LDH SERPL-CCNC: 222 U/L
LEUKOCYTE ESTERASE URINE: NEGATIVE
LYMPHOCYTES # BLD AUTO: 1.1 K/UL
LYMPHOCYTES NFR BLD AUTO: 15 %
MAGNESIUM SERPL-MCNC: 2.1 MG/DL
MAN DIFF?: NORMAL
MCHC RBC-ENTMCNC: 30.6 PG
MCHC RBC-ENTMCNC: 32 GM/DL
MCV RBC AUTO: 95.5 FL
MICROSCOPIC-UA: NORMAL
MONOCYTES # BLD AUTO: 0.28 K/UL
MONOCYTES NFR BLD AUTO: 3.8 %
NEUTROPHILS # BLD AUTO: 5.64 K/UL
NEUTROPHILS NFR BLD AUTO: 76.8 %
NITRITE URINE: NEGATIVE
PH URINE: 6.5
PHOSPHATE SERPL-MCNC: 6.5 MG/DL
PLATELET # BLD AUTO: 229 K/UL
POTASSIUM SERPL-SCNC: 4.8 MMOL/L
PROT SERPL-MCNC: 6.1 G/DL
PROT UR-MCNC: 114 MG/DL
PROTEIN URINE: ABNORMAL
RBC # BLD: 2.65 M/UL
RBC # FLD: 13.2 %
RED BLOOD CELLS URINE: 133 /HPF
SODIUM SERPL-SCNC: 137 MMOL/L
SPECIFIC GRAVITY URINE: 1.01
SQUAMOUS EPITHELIAL CELLS: 2 /HPF
TACROLIMUS SERPL-MCNC: 4.5 NG/ML
URATE SERPL-MCNC: 8 MG/DL
UROBILINOGEN URINE: NORMAL
WBC # FLD AUTO: 7.35 K/UL
WHITE BLOOD CELLS URINE: 14 /HPF

## 2019-11-04 ENCOUNTER — APPOINTMENT (OUTPATIENT)
Dept: NEPHROLOGY | Facility: CLINIC | Age: 39
End: 2019-11-04
Payer: MEDICARE

## 2019-11-04 ENCOUNTER — OTHER (OUTPATIENT)
Age: 39
End: 2019-11-04

## 2019-11-04 VITALS
RESPIRATION RATE: 14 BRPM | SYSTOLIC BLOOD PRESSURE: 139 MMHG | TEMPERATURE: 98.3 F | HEIGHT: 64 IN | HEART RATE: 90 BPM | OXYGEN SATURATION: 100 % | WEIGHT: 125.1 LBS | BODY MASS INDEX: 21.36 KG/M2 | DIASTOLIC BLOOD PRESSURE: 85 MMHG

## 2019-11-04 DIAGNOSIS — N18.6 END STAGE RENAL DISEASE: ICD-10-CM

## 2019-11-04 DIAGNOSIS — Z99.2 END STAGE RENAL DISEASE: ICD-10-CM

## 2019-11-04 DIAGNOSIS — Z01.818 ENCOUNTER FOR OTHER PREPROCEDURAL EXAMINATION: ICD-10-CM

## 2019-11-04 LAB
ALBUMIN SERPL ELPH-MCNC: 4.2 G/DL
ALP BLD-CCNC: 112 U/L
ALT SERPL-CCNC: 10 U/L
ANION GAP SERPL CALC-SCNC: 18 MMOL/L
APPEARANCE: CLEAR
AST SERPL-CCNC: 15 U/L
BACTERIA: NEGATIVE
BASOPHILS # BLD AUTO: 0.12 K/UL
BASOPHILS NFR BLD AUTO: 1.7 %
BILIRUB SERPL-MCNC: 0.2 MG/DL
BILIRUBIN URINE: NEGATIVE
BKV DNA SPEC QL NAA+PROBE: NOT DETECTED COPIES/ML
BLOOD URINE: NORMAL
BUN SERPL-MCNC: 76 MG/DL
CALCIUM SERPL-MCNC: 9.1 MG/DL
CALCIUM SERPL-MCNC: 9.1 MG/DL
CHLORIDE SERPL-SCNC: 101 MMOL/L
CO2 SERPL-SCNC: 24 MMOL/L
COLOR: NORMAL
CREAT SERPL-MCNC: 5.89 MG/DL
CREAT SPEC-SCNC: 72 MG/DL
CREAT/PROT UR: 0.6 RATIO
EOSINOPHIL # BLD AUTO: 0.18 K/UL
EOSINOPHIL NFR BLD AUTO: 2.6 %
GLUCOSE QUALITATIVE U: NORMAL
GLUCOSE SERPL-MCNC: 141 MG/DL
HCT VFR BLD CALC: 24.7 %
HGB BLD-MCNC: 7.9 G/DL
HYALINE CASTS: 1 /LPF
KETONES URINE: NEGATIVE
LDH SERPL-CCNC: 222 U/L
LEUKOCYTE ESTERASE URINE: NEGATIVE
LYMPHOCYTES # BLD AUTO: 1.14 K/UL
LYMPHOCYTES NFR BLD AUTO: 16.4 %
MAGNESIUM SERPL-MCNC: 1.9 MG/DL
MAN DIFF?: NORMAL
MCHC RBC-ENTMCNC: 30.9 PG
MCHC RBC-ENTMCNC: 32 GM/DL
MCV RBC AUTO: 96.5 FL
MICROSCOPIC-UA: NORMAL
MONOCYTES # BLD AUTO: 0.18 K/UL
MONOCYTES NFR BLD AUTO: 2.6 %
NEUTROPHILS # BLD AUTO: 5.35 K/UL
NEUTROPHILS NFR BLD AUTO: 76.7 %
NITRITE URINE: NEGATIVE
PARATHYROID HORMONE INTACT: 544 PG/ML
PH URINE: 6.5
PHOSPHATE SERPL-MCNC: 5.6 MG/DL
PLATELET # BLD AUTO: 271 K/UL
POTASSIUM SERPL-SCNC: 4.6 MMOL/L
PROT SERPL-MCNC: 6.5 G/DL
PROT UR-MCNC: 40 MG/DL
PROTEIN URINE: ABNORMAL
RBC # BLD: 2.56 M/UL
RBC # FLD: 13.4 %
RED BLOOD CELLS URINE: 9 /HPF
SODIUM SERPL-SCNC: 142 MMOL/L
SPECIFIC GRAVITY URINE: 1.01
SQUAMOUS EPITHELIAL CELLS: 1 /HPF
TACROLIMUS SERPL-MCNC: 7.8 NG/ML
URATE SERPL-MCNC: 10.3 MG/DL
UROBILINOGEN URINE: NORMAL
WBC # FLD AUTO: 6.98 K/UL
WHITE BLOOD CELLS URINE: 6 /HPF

## 2019-11-04 PROCEDURE — 99214 OFFICE O/P EST MOD 30 MIN: CPT

## 2019-11-04 NOTE — HISTORY OF PRESENT ILLNESS
[ Donor] :  donor [Basiliximab] : basiliximab [Positive/Positive] : Donor Positive/Recipient Positive [PHS Increased Risk] : Public Health Service increased risk [Hepatitis C] : hepatitis c [Brain Death] : brain death [Received on Pump] : organ received on pump [KDPI: ____] : Kidney Donor Profile Index: [unfilled] [Cold Ischemic Time: ____] : Cold Ischemic Time: [unfilled] [ABO Incompatible] : ABO compatible [TextBox_34] : DGF requiring HD for hyperkalemia [de-identified] : 39 male, hx DM1, HTN on dialysis since 2013.  did well post transplant but had DGF.  \par Last dialysis last week on Monday.  He is urinating over 2L per day.  Had cramps in forearm this weekend.\par Blood sugars ok in morning but sometimes in 200's in the evening.  Blood pressure is controlled.

## 2019-11-04 NOTE — PHYSICAL EXAM
[General Appearance - Alert] : alert [General Appearance - In No Acute Distress] : in no acute distress [General Appearance - Well Nourished] : well nourished [Sclera] : the sclera and conjunctiva were normal [Extraocular Movements] : extraocular movements were intact [Outer Ear] : the ears and nose were normal in appearance [Hearing Threshold Finger Rub Not [place]] : hearing was normal [Examination Of The Oral Cavity] : the lips and gums were normal [Neck Appearance] : the appearance of the neck was normal [Neck Cervical Mass (___cm)] : no neck mass was observed [Jugular Venous Distention Increased] : there was no jugular-venous distention [Thyroid Diffuse Enlargement] : the thyroid was not enlarged [Respiration, Rhythm And Depth] : normal respiratory rhythm and effort [Auscultation Breath Sounds / Voice Sounds] : lungs were clear to auscultation bilaterally [Chest Palpation] : palpation of the chest revealed no abnormalities [Heart Rate And Rhythm] : heart rate was normal and rhythm regular [Heart Sounds] : normal S1 and S2 [Heart Sounds Gallop] : no gallops [Edema] : there was no peripheral edema [Bowel Sounds] : normal bowel sounds [Abdomen Soft] : soft [Abdomen Tenderness] : non-tender [Cervical Lymph Nodes Enlarged Posterior Bilaterally] : posterior cervical [Cervical Lymph Nodes Enlarged Anterior Bilaterally] : anterior cervical [Supraclavicular Lymph Nodes Enlarged Bilaterally] : supraclavicular [No CVA Tenderness] : no ~M costovertebral angle tenderness [Abnormal Walk] : normal gait [Nail Clubbing] : no clubbing  or cyanosis of the fingernails [Musculoskeletal - Swelling] : no joint swelling seen [Skin Color & Pigmentation] : normal skin color and pigmentation [Skin Turgor] : normal skin turgor [] : no rash [Cranial Nerves] : cranial nerves 2-12 were intact [No Focal Deficits] : no focal deficits [Oriented To Time, Place, And Person] : oriented to person, place, and time [Impaired Insight] : insight and judgment were intact [Affect] : the affect was normal [Mood] : the mood was normal

## 2019-11-04 NOTE — ASSESSMENT
[FreeTextEntry1] : 1.  Renal transplant - Pt with DGF but seems to be resolving.  Will check renal function today and decide is pt needs further dialysis.  \par 2.  Immunosuppression - simulect induction, cont tac for target 7-10,  MMF 1gm BID and pred 5.\par 3.  HTN - controlled\par 4.  DM1 - Increase novolog to 7 units with meals\par 5.  Loose stools - stopped all softeners.  Will monitor, may need to reduce MMF. \par 6.  HCV - Awaiting medication start.  \par \par Pt will return for f/u next week.

## 2019-11-07 ENCOUNTER — APPOINTMENT (OUTPATIENT)
Dept: TRANSPLANT | Facility: CLINIC | Age: 39
End: 2019-11-07

## 2019-11-07 ENCOUNTER — APPOINTMENT (OUTPATIENT)
Dept: NEPHROLOGY | Facility: CLINIC | Age: 39
End: 2019-11-07

## 2019-11-08 LAB
ALBUMIN SERPL ELPH-MCNC: 3.8 G/DL
ALP BLD-CCNC: 119 U/L
ALT SERPL-CCNC: 10 U/L
ANION GAP SERPL CALC-SCNC: 10 MMOL/L
APPEARANCE: CLEAR
AST SERPL-CCNC: 12 U/L
BACTERIA: NEGATIVE
BASOPHILS # BLD AUTO: 0.04 K/UL
BASOPHILS NFR BLD AUTO: 0.6 %
BILIRUB SERPL-MCNC: 0.2 MG/DL
BILIRUBIN URINE: NEGATIVE
BKV DNA SPEC QL NAA+PROBE: NOT DETECTED COPIES/ML
BLOOD URINE: NEGATIVE
BUN SERPL-MCNC: 30 MG/DL
CALCIUM SERPL-MCNC: 9.2 MG/DL
CHLORIDE SERPL-SCNC: 111 MMOL/L
CO2 SERPL-SCNC: 20 MMOL/L
COLOR: NORMAL
CREAT SERPL-MCNC: 2.08 MG/DL
CREAT SPEC-SCNC: 91 MG/DL
CREAT/PROT UR: 0.4 RATIO
EOSINOPHIL # BLD AUTO: 0.08 K/UL
EOSINOPHIL NFR BLD AUTO: 1.2 %
GLUCOSE QUALITATIVE U: NEGATIVE
GLUCOSE SERPL-MCNC: 133 MG/DL
HCT VFR BLD CALC: 24 %
HGB BLD-MCNC: 7.5 G/DL
HYALINE CASTS: 1 /LPF
IMM GRANULOCYTES NFR BLD AUTO: 0.4 %
KETONES URINE: NEGATIVE
LDH SERPL-CCNC: 172 U/L
LEUKOCYTE ESTERASE URINE: NEGATIVE
LYMPHOCYTES # BLD AUTO: 0.88 K/UL
LYMPHOCYTES NFR BLD AUTO: 12.8 %
MAGNESIUM SERPL-MCNC: 1.6 MG/DL
MAN DIFF?: NORMAL
MCHC RBC-ENTMCNC: 31 PG
MCHC RBC-ENTMCNC: 31.3 GM/DL
MCV RBC AUTO: 99.2 FL
MICROSCOPIC-UA: NORMAL
MONOCYTES # BLD AUTO: 0.07 K/UL
MONOCYTES NFR BLD AUTO: 1 %
NEUTROPHILS # BLD AUTO: 5.8 K/UL
NEUTROPHILS NFR BLD AUTO: 84 %
NITRITE URINE: NEGATIVE
PH URINE: 6
PHOSPHATE SERPL-MCNC: 2.3 MG/DL
PLATELET # BLD AUTO: 282 K/UL
POTASSIUM SERPL-SCNC: 4.9 MMOL/L
PROT SERPL-MCNC: 6.1 G/DL
PROT UR-MCNC: 33 MG/DL
PROTEIN URINE: ABNORMAL
RBC # BLD: 2.42 M/UL
RBC # FLD: 13.8 %
RED BLOOD CELLS URINE: 3 /HPF
SODIUM SERPL-SCNC: 141 MMOL/L
SPECIFIC GRAVITY URINE: 1.01
SQUAMOUS EPITHELIAL CELLS: 1 /HPF
TACROLIMUS SERPL-MCNC: 6.1 NG/ML
URATE SERPL-MCNC: 7.3 MG/DL
UROBILINOGEN URINE: NORMAL
WBC # FLD AUTO: 6.9 K/UL
WHITE BLOOD CELLS URINE: 3 /HPF

## 2019-11-11 LAB
ALBUMIN SERPL ELPH-MCNC: 4.2 G/DL
ALP BLD-CCNC: 109 U/L
ALT SERPL-CCNC: 10 U/L
ANION GAP SERPL CALC-SCNC: 14 MMOL/L
APPEARANCE: CLEAR
AST SERPL-CCNC: 11 U/L
BACTERIA: NEGATIVE
BASOPHILS # BLD AUTO: 0.05 K/UL
BASOPHILS NFR BLD AUTO: 0.6 %
BILIRUB SERPL-MCNC: 0.3 MG/DL
BILIRUBIN URINE: NEGATIVE
BKV DNA SPEC QL NAA+PROBE: NOT DETECTED COPIES/ML
BLOOD URINE: ABNORMAL
BUN SERPL-MCNC: 51 MG/DL
CALCIUM SERPL-MCNC: 9.3 MG/DL
CHLORIDE SERPL-SCNC: 107 MMOL/L
CMV DNA SPEC QL NAA+PROBE: NOT DETECTED
CMVPCR LOG: NOT DETECTED LOGIU/ML
CO2 SERPL-SCNC: 21 MMOL/L
COLOR: NORMAL
CREAT SERPL-MCNC: 3.39 MG/DL
CREAT SPEC-SCNC: 80 MG/DL
CREAT/PROT UR: 0.5 RATIO
EOSINOPHIL # BLD AUTO: 0.13 K/UL
EOSINOPHIL NFR BLD AUTO: 1.5 %
FERRITIN SERPL-MCNC: 653 NG/ML
FOLATE SERPL-MCNC: 19.9 NG/ML
GLUCOSE QUALITATIVE U: NORMAL
GLUCOSE SERPL-MCNC: 157 MG/DL
HCT VFR BLD CALC: 25.9 %
HGB BLD-MCNC: 8.2 G/DL
HYALINE CASTS: 0 /LPF
IMM GRANULOCYTES NFR BLD AUTO: 0.5 %
IRON SATN MFR SERPL: 21 %
IRON SERPL-MCNC: 50 UG/DL
KETONES URINE: NEGATIVE
LDH SERPL-CCNC: 209 U/L
LEUKOCYTE ESTERASE URINE: NEGATIVE
LYMPHOCYTES # BLD AUTO: 1.33 K/UL
LYMPHOCYTES NFR BLD AUTO: 15.1 %
MAGNESIUM SERPL-MCNC: 1.7 MG/DL
MAN DIFF?: NORMAL
MCHC RBC-ENTMCNC: 31.1 PG
MCHC RBC-ENTMCNC: 31.7 GM/DL
MCV RBC AUTO: 98.1 FL
MICROSCOPIC-UA: NORMAL
MONOCYTES # BLD AUTO: 0.1 K/UL
MONOCYTES NFR BLD AUTO: 1.1 %
NEUTROPHILS # BLD AUTO: 7.17 K/UL
NEUTROPHILS NFR BLD AUTO: 81.2 %
NITRITE URINE: NEGATIVE
PH URINE: 6
PHOSPHATE SERPL-MCNC: 3.6 MG/DL
PLATELET # BLD AUTO: 328 K/UL
POTASSIUM SERPL-SCNC: 5.2 MMOL/L
PROT SERPL-MCNC: 6.9 G/DL
PROT UR-MCNC: 36 MG/DL
PROTEIN URINE: ABNORMAL
RBC # BLD: 2.64 M/UL
RBC # FLD: 13.5 %
RED BLOOD CELLS URINE: 32 /HPF
SODIUM SERPL-SCNC: 142 MMOL/L
SPECIFIC GRAVITY URINE: 1.01
SQUAMOUS EPITHELIAL CELLS: 0 /HPF
TACROLIMUS SERPL-MCNC: 7 NG/ML
TIBC SERPL-MCNC: 237 UG/DL
UIBC SERPL-MCNC: 187 UG/DL
UROBILINOGEN URINE: NORMAL
VIT B12 SERPL-MCNC: 614 PG/ML
WBC # FLD AUTO: 8.82 K/UL
WHITE BLOOD CELLS URINE: 3 /HPF

## 2019-11-12 PROCEDURE — 85730 THROMBOPLASTIN TIME PARTIAL: CPT

## 2019-11-12 PROCEDURE — 99261: CPT

## 2019-11-12 PROCEDURE — 86850 RBC ANTIBODY SCREEN: CPT

## 2019-11-12 PROCEDURE — 82330 ASSAY OF CALCIUM: CPT

## 2019-11-12 PROCEDURE — C1889: CPT

## 2019-11-12 PROCEDURE — 82947 ASSAY GLUCOSE BLOOD QUANT: CPT

## 2019-11-12 PROCEDURE — 84132 ASSAY OF SERUM POTASSIUM: CPT

## 2019-11-12 PROCEDURE — 83605 ASSAY OF LACTIC ACID: CPT

## 2019-11-12 PROCEDURE — 82962 GLUCOSE BLOOD TEST: CPT

## 2019-11-12 PROCEDURE — 85027 COMPLETE CBC AUTOMATED: CPT

## 2019-11-12 PROCEDURE — 83036 HEMOGLOBIN GLYCOSYLATED A1C: CPT

## 2019-11-12 PROCEDURE — 85014 HEMATOCRIT: CPT

## 2019-11-12 PROCEDURE — 82248 BILIRUBIN DIRECT: CPT

## 2019-11-12 PROCEDURE — 83735 ASSAY OF MAGNESIUM: CPT

## 2019-11-12 PROCEDURE — 82803 BLOOD GASES ANY COMBINATION: CPT

## 2019-11-12 PROCEDURE — 84100 ASSAY OF PHOSPHORUS: CPT

## 2019-11-12 PROCEDURE — 84295 ASSAY OF SERUM SODIUM: CPT

## 2019-11-12 PROCEDURE — 80197 ASSAY OF TACROLIMUS: CPT

## 2019-11-12 PROCEDURE — 80053 COMPREHEN METABOLIC PANEL: CPT

## 2019-11-12 PROCEDURE — 85610 PROTHROMBIN TIME: CPT

## 2019-11-12 PROCEDURE — 86900 BLOOD TYPING SEROLOGIC ABO: CPT

## 2019-11-12 PROCEDURE — 80048 BASIC METABOLIC PNL TOTAL CA: CPT

## 2019-11-12 PROCEDURE — 76776 US EXAM K TRANSPL W/DOPPLER: CPT

## 2019-11-12 PROCEDURE — 80074 ACUTE HEPATITIS PANEL: CPT

## 2019-11-12 PROCEDURE — 86901 BLOOD TYPING SEROLOGIC RH(D): CPT

## 2019-11-12 PROCEDURE — C2617: CPT

## 2019-11-12 PROCEDURE — 93005 ELECTROCARDIOGRAM TRACING: CPT

## 2019-11-12 PROCEDURE — 82435 ASSAY OF BLOOD CHLORIDE: CPT

## 2019-11-12 PROCEDURE — 71045 X-RAY EXAM CHEST 1 VIEW: CPT

## 2019-11-12 PROCEDURE — 87902 NFCT AGT GNTYP ALYS HEP C: CPT

## 2019-11-12 PROCEDURE — 87522 HEPATITIS C REVRS TRNSCRPJ: CPT

## 2019-11-12 PROCEDURE — C1769: CPT

## 2019-11-13 ENCOUNTER — APPOINTMENT (OUTPATIENT)
Dept: TRANSPLANT | Facility: CLINIC | Age: 39
End: 2019-11-13

## 2019-11-13 ENCOUNTER — APPOINTMENT (OUTPATIENT)
Dept: TRANSPLANT | Facility: CLINIC | Age: 39
End: 2019-11-13
Payer: MEDICARE

## 2019-11-13 VITALS
RESPIRATION RATE: 14 BRPM | HEART RATE: 87 BPM | SYSTOLIC BLOOD PRESSURE: 152 MMHG | OXYGEN SATURATION: 100 % | HEIGHT: 64 IN | DIASTOLIC BLOOD PRESSURE: 87 MMHG | TEMPERATURE: 98.2 F

## 2019-11-13 LAB
ALBUMIN SERPL ELPH-MCNC: 4 G/DL
ALP BLD-CCNC: 236 U/L
ALT SERPL-CCNC: 7 U/L
ANION GAP SERPL CALC-SCNC: 9 MMOL/L
APPEARANCE: CLEAR
AST SERPL-CCNC: 9 U/L
BACTERIA: NEGATIVE
BASOPHILS # BLD AUTO: 0.04 K/UL
BASOPHILS NFR BLD AUTO: 0.7 %
BILIRUB SERPL-MCNC: 0.2 MG/DL
BILIRUBIN URINE: NEGATIVE
BLOOD URINE: NEGATIVE
BUN SERPL-MCNC: 15 MG/DL
CALCIUM SERPL-MCNC: 9.5 MG/DL
CALCIUM SERPL-MCNC: 9.5 MG/DL
CHLORIDE SERPL-SCNC: 110 MMOL/L
CO2 SERPL-SCNC: 22 MMOL/L
COLOR: NORMAL
CREAT SERPL-MCNC: 1.56 MG/DL
CREAT SPEC-SCNC: 56 MG/DL
CREAT/PROT UR: 0.3 RATIO
EOSINOPHIL # BLD AUTO: 0.03 K/UL
EOSINOPHIL NFR BLD AUTO: 0.6 %
GLUCOSE QUALITATIVE U: NEGATIVE
GLUCOSE SERPL-MCNC: 122 MG/DL
HCT VFR BLD CALC: 24.9 %
HGB BLD-MCNC: 7.7 G/DL
HYALINE CASTS: 0 /LPF
IMM GRANULOCYTES NFR BLD AUTO: 0.2 %
KETONES URINE: NEGATIVE
LDH SERPL-CCNC: 199 U/L
LEUKOCYTE ESTERASE URINE: NEGATIVE
LYMPHOCYTES # BLD AUTO: 1.01 K/UL
LYMPHOCYTES NFR BLD AUTO: 18.9 %
MAGNESIUM SERPL-MCNC: 1.6 MG/DL
MAN DIFF?: NORMAL
MCHC RBC-ENTMCNC: 30.9 GM/DL
MCHC RBC-ENTMCNC: 31.3 PG
MCV RBC AUTO: 101.2 FL
MICROSCOPIC-UA: NORMAL
MONOCYTES # BLD AUTO: 0.24 K/UL
MONOCYTES NFR BLD AUTO: 4.5 %
NEUTROPHILS # BLD AUTO: 4.01 K/UL
NEUTROPHILS NFR BLD AUTO: 75.1 %
NITRITE URINE: NEGATIVE
PARATHYROID HORMONE INTACT: 211 PG/ML
PH URINE: 6.5
PHOSPHATE SERPL-MCNC: 2.2 MG/DL
PLATELET # BLD AUTO: 362 K/UL
POTASSIUM SERPL-SCNC: 5.4 MMOL/L
PROT SERPL-MCNC: 6.1 G/DL
PROT UR-MCNC: 19 MG/DL
PROTEIN URINE: NORMAL
RBC # BLD: 2.46 M/UL
RBC # FLD: 15.3 %
RED BLOOD CELLS URINE: 1 /HPF
SODIUM SERPL-SCNC: 141 MMOL/L
SPECIFIC GRAVITY URINE: 1.01
SQUAMOUS EPITHELIAL CELLS: 1 /HPF
TACROLIMUS SERPL-MCNC: 12.1 NG/ML
URATE SERPL-MCNC: 4.4 MG/DL
UROBILINOGEN URINE: NORMAL
WBC # FLD AUTO: 5.34 K/UL
WHITE BLOOD CELLS URINE: 1 /HPF

## 2019-11-13 PROCEDURE — 99024 POSTOP FOLLOW-UP VISIT: CPT

## 2019-11-13 RX ORDER — FUROSEMIDE 40 MG/1
40 TABLET ORAL DAILY
Qty: 90 | Refills: 0 | Status: DISCONTINUED | COMMUNITY
Start: 2019-10-24 | End: 2019-11-13

## 2019-11-13 NOTE — HISTORY OF PRESENT ILLNESS
[ Donor] :  donor [Basiliximab] : basiliximab [Positive/Positive] : Donor Positive/Recipient Positive [] : Yes [PHS Increased Risk] : [facility] increased risk [Hepatitis C] : hepatitis c [Brain Death] : brain death [ABO Incompatible] : ABO incompatible [TextBox_34] : Delayed graft function [Received on Pump] : organ received on pump [de-identified] : Interval Events: 39 male, hx DM1, HTN on dialysis since 2013. did well post transplant but had DGF. \par \par He is ambulating well, with no complaints.  He reports feeling well with a good appetite.  No fevers, coughs, night sweats.\par He is urinating over 2L per day. \par Blood sugars ok in morning but sometimes in 170-180 i pre-dinner. \par Blood pressure is labile, recording systolic BP of 170s and a few episodes of sBP~80 - reports feeling dizzy and tired at those times.\par \par

## 2019-11-13 NOTE — PHYSICAL EXAM
[Normal] : normal [Soft] : soft [Non-tender] : non-tender [] : right femoral palpable [Clean] : clean [Dry] : dry [Healing Well] : healing well [Foul Odor] : no foul smell [Purulent Drainage] : no purulent drainage [Serosanguinous Drainage] : no serosanguinous drainage [Erythema] : not erythematous [Warm] : not warm

## 2019-11-13 NOTE — ASSESSMENT
[FreeTextEntry1] : Doing well post DDRT, requiring some optimization of BP meds.\par Labs last week - anemia Hb 75, Hct 24%, MCV N, MCHC 31L, Ferritin 653H, TIBC 237 L/norm, \par \par

## 2019-11-13 NOTE — PLAN
[FreeTextEntry1] : 1)await labs from today 2) Iron tabs and procrit for anemia 3) decrease nifedipine XR from 60mg > 30mg  4) continue monitoring BP, UOP and blood sugar levels (continue novalog 7u with meals) 5) needs to have cystoscopy and ureteric stent removal scheduled for next couple weeks 6) immunosuppressant envarsus target 7-10, MMF 1mg BID, prednisone 5 7) await HCV genotyping to start medication

## 2019-11-14 LAB — BKV DNA SPEC QL NAA+PROBE: NOT DETECTED COPIES/ML

## 2019-11-18 ENCOUNTER — APPOINTMENT (OUTPATIENT)
Dept: NEPHROLOGY | Facility: CLINIC | Age: 39
End: 2019-11-18
Payer: MEDICARE

## 2019-11-18 DIAGNOSIS — Z76.82 AWAITING ORGAN TRANSPLANT STATUS: ICD-10-CM

## 2019-11-18 LAB
HCV GENTYP BLD NAA+PROBE: NORMAL
HCV RNA SERPL NAA DL=5-ACNC: ABNORMAL IU/ML
HCV RNA SERPL NAA+PROBE-LOG IU: 4.38 LOGIU/ML

## 2019-11-18 PROCEDURE — 99214 OFFICE O/P EST MOD 30 MIN: CPT

## 2019-11-18 RX ORDER — DOCUSATE SODIUM 100 MG/1
100 CAPSULE ORAL TWICE DAILY
Qty: 60 | Refills: 2 | Status: DISCONTINUED | COMMUNITY
Start: 2019-10-24 | End: 2019-11-18

## 2019-11-18 RX ORDER — SENNOSIDES 8.6 MG TABLETS 8.6 MG/1
8.6 TABLET ORAL
Qty: 30 | Refills: 3 | Status: DISCONTINUED | COMMUNITY
Start: 2019-10-24 | End: 2019-11-18

## 2019-11-18 NOTE — ASSESSMENT
[FreeTextEntry1] : 1.  Renal transplant - Pt with DGF which has resolved.  Last creatinine 1.5 and excellent UO.  Stopped lasix. \par 2.  Immunosuppression - simulect induction, cont tac for target 7-10,  MMF 500mgs BID and pred 5.  PT does not tolerate higher dose of MMF due to diarrhea.  \par 3.  HTN - controlled\par 4.  DM1 - sent in new test strips.  Pt encouraged to monitor blood sugars closely.  \par 5.  Loose stools - resolved on MMF 500mgs BID. \par 6.  HCV - Awaiting medication start.  \par 7.  Anemia - pt on iron and Rx sent for procrit.  \par \par Pt will return for f/u next week.

## 2019-11-18 NOTE — HISTORY OF PRESENT ILLNESS
[ Donor] :  donor [Basiliximab] : basiliximab [Positive/Positive] : Donor Positive/Recipient Positive [PHS Increased Risk] : [facility] Health Service increased risk [Hepatitis C] : hepatitis c [Brain Death] : brain death [Received on Pump] : organ received on pump [KDPI: ____] : Kidney Donor Profile Index: [unfilled] [Cold Ischemic Time: ____] : Cold Ischemic Time: [unfilled] [ABO Incompatible] : ABO compatible [TextBox_34] : DGF requiring HD for hyperkalemia [de-identified] : 39 male, hx DM1, HTN on dialysis since 2013.  did well post transplant but had DGF.  \par since last visit DGF resolved.  Pts last creatinine 1.5.  He feels well, ran out of test strips for 2 days.  Blood pressure is controlled, skipped nifedipine because BP was slightly low.  Urinating well.  Had diarrhea for about 2 weeks - MMF was reduced to 500mgs TID but did not resolve so further reduced to 500mgs BID.  Currently no further diarrhea.

## 2019-11-18 NOTE — REASON FOR VISIT
[Spouse] : spouse [Follow-Up] : a follow-up visit [FreeTextEntry3] :  donor kidney transplant [FreeTextEntry5] : October 22, 2019

## 2019-11-18 NOTE — PHYSICAL EXAM
[General Appearance - Alert] : alert [General Appearance - In No Acute Distress] : in no acute distress [General Appearance - Well Nourished] : well nourished [Sclera] : the sclera and conjunctiva were normal [Outer Ear] : the ears and nose were normal in appearance [Extraocular Movements] : extraocular movements were intact [Examination Of The Oral Cavity] : the lips and gums were normal [Hearing Threshold Finger Rub Not [place]] : hearing was normal [Neck Appearance] : the appearance of the neck was normal [Neck Cervical Mass (___cm)] : no neck mass was observed [Jugular Venous Distention Increased] : there was no jugular-venous distention [Thyroid Diffuse Enlargement] : the thyroid was not enlarged [Respiration, Rhythm And Depth] : normal respiratory rhythm and effort [Auscultation Breath Sounds / Voice Sounds] : lungs were clear to auscultation bilaterally [Chest Palpation] : palpation of the chest revealed no abnormalities [Heart Rate And Rhythm] : heart rate was normal and rhythm regular [Heart Sounds Gallop] : no gallops [Heart Sounds] : normal S1 and S2 [Edema] : there was no peripheral edema [Bowel Sounds] : normal bowel sounds [Abdomen Soft] : soft [Abdomen Tenderness] : non-tender [Cervical Lymph Nodes Enlarged Posterior Bilaterally] : posterior cervical [Cervical Lymph Nodes Enlarged Anterior Bilaterally] : anterior cervical [No CVA Tenderness] : no ~M costovertebral angle tenderness [Supraclavicular Lymph Nodes Enlarged Bilaterally] : supraclavicular [Abnormal Walk] : normal gait [Nail Clubbing] : no clubbing  or cyanosis of the fingernails [Musculoskeletal - Swelling] : no joint swelling seen [Skin Color & Pigmentation] : normal skin color and pigmentation [Skin Turgor] : normal skin turgor [] : no rash [Cranial Nerves] : cranial nerves 2-12 were intact [No Focal Deficits] : no focal deficits [Oriented To Time, Place, And Person] : oriented to person, place, and time [Impaired Insight] : insight and judgment were intact [Mood] : the mood was normal [Affect] : the affect was normal

## 2019-11-19 LAB
ALBUMIN SERPL ELPH-MCNC: 3.9 G/DL
ALP BLD-CCNC: 304 U/L
ALT SERPL-CCNC: 8 U/L
ANION GAP SERPL CALC-SCNC: 9 MMOL/L
APPEARANCE: CLEAR
AST SERPL-CCNC: 11 U/L
BACTERIA: NEGATIVE
BASOPHILS # BLD AUTO: 0.03 K/UL
BASOPHILS NFR BLD AUTO: 0.6 %
BILIRUB SERPL-MCNC: 0.2 MG/DL
BILIRUBIN URINE: NEGATIVE
BLOOD URINE: NEGATIVE
BUN SERPL-MCNC: 15 MG/DL
CALCIUM SERPL-MCNC: 9.9 MG/DL
CHLORIDE SERPL-SCNC: 108 MMOL/L
CMV DNA SPEC QL NAA+PROBE: NOT DETECTED
CMVPCR LOG: NOT DETECTED LOGIU/ML
CO2 SERPL-SCNC: 22 MMOL/L
COLOR: NORMAL
CREAT SERPL-MCNC: 1.49 MG/DL
CREAT SPEC-SCNC: 120 MG/DL
CREAT/PROT UR: 0.4 RATIO
EOSINOPHIL # BLD AUTO: 0.02 K/UL
EOSINOPHIL NFR BLD AUTO: 0.4 %
GLUCOSE QUALITATIVE U: ABNORMAL
GLUCOSE SERPL-MCNC: 260 MG/DL
HCT VFR BLD CALC: 27.4 %
HGB BLD-MCNC: 8.4 G/DL
HYALINE CASTS: 7 /LPF
IMM GRANULOCYTES NFR BLD AUTO: 1.1 %
KETONES URINE: NEGATIVE
LDH SERPL-CCNC: 189 U/L
LEUKOCYTE ESTERASE URINE: NEGATIVE
LYMPHOCYTES # BLD AUTO: 0.46 K/UL
LYMPHOCYTES NFR BLD AUTO: 9.7 %
MAGNESIUM SERPL-MCNC: 1.7 MG/DL
MAN DIFF?: NORMAL
MCHC RBC-ENTMCNC: 30.7 GM/DL
MCHC RBC-ENTMCNC: 30.8 PG
MCV RBC AUTO: 100.4 FL
MICROSCOPIC-UA: NORMAL
MONOCYTES # BLD AUTO: 0.32 K/UL
MONOCYTES NFR BLD AUTO: 6.8 %
NEUTROPHILS # BLD AUTO: 3.84 K/UL
NEUTROPHILS NFR BLD AUTO: 81.4 %
NITRITE URINE: NEGATIVE
PH URINE: 6
PHOSPHATE SERPL-MCNC: 2 MG/DL
PLATELET # BLD AUTO: 524 K/UL
POTASSIUM SERPL-SCNC: 6 MMOL/L
PROT SERPL-MCNC: 6.2 G/DL
PROT UR-MCNC: 45 MG/DL
PROTEIN URINE: ABNORMAL
RBC # BLD: 2.73 M/UL
RBC # FLD: 15.7 %
RED BLOOD CELLS URINE: 2 /HPF
SODIUM SERPL-SCNC: 139 MMOL/L
SPECIFIC GRAVITY URINE: 1.02
SQUAMOUS EPITHELIAL CELLS: 2 /HPF
TACROLIMUS SERPL-MCNC: 5.5 NG/ML
URATE SERPL-MCNC: 4.3 MG/DL
UROBILINOGEN URINE: NORMAL
WBC # FLD AUTO: 4.72 K/UL
WHITE BLOOD CELLS URINE: 8 /HPF

## 2019-11-19 RX ORDER — SULFAMETHOXAZOLE AND TRIMETHOPRIM 400; 80 MG/1; MG/1
400-80 TABLET ORAL
Qty: 90 | Refills: 3 | Status: DISCONTINUED | COMMUNITY
Start: 2019-10-24 | End: 2019-11-19

## 2019-11-20 LAB — BKV DNA SPEC QL NAA+PROBE: NOT DETECTED COPIES/ML

## 2019-11-22 ENCOUNTER — APPOINTMENT (OUTPATIENT)
Dept: TRANSPLANT | Facility: CLINIC | Age: 39
End: 2019-11-22

## 2019-11-25 LAB
ALBUMIN SERPL ELPH-MCNC: 4.3 G/DL
ALP BLD-CCNC: 338 U/L
ALT SERPL-CCNC: 7 U/L
ANION GAP SERPL CALC-SCNC: 10 MMOL/L
AST SERPL-CCNC: 9 U/L
BASOPHILS # BLD AUTO: 0.04 K/UL
BASOPHILS NFR BLD AUTO: 1 %
BILIRUB SERPL-MCNC: 0.2 MG/DL
BUN SERPL-MCNC: 18 MG/DL
CALCIUM SERPL-MCNC: 10.3 MG/DL
CALCIUM SERPL-MCNC: 10.3 MG/DL
CHLORIDE SERPL-SCNC: 109 MMOL/L
CO2 SERPL-SCNC: 24 MMOL/L
CREAT SERPL-MCNC: 1.43 MG/DL
EOSINOPHIL # BLD AUTO: 0.05 K/UL
EOSINOPHIL NFR BLD AUTO: 1.2 %
GLUCOSE SERPL-MCNC: 131 MG/DL
HCT VFR BLD CALC: 30.3 %
HGB BLD-MCNC: 9.4 G/DL
IMM GRANULOCYTES NFR BLD AUTO: 3.6 %
LDH SERPL-CCNC: 209 U/L
LYMPHOCYTES # BLD AUTO: 1.28 K/UL
LYMPHOCYTES NFR BLD AUTO: 31 %
MAGNESIUM SERPL-MCNC: 1.6 MG/DL
MAN DIFF?: NORMAL
MCHC RBC-ENTMCNC: 30.6 PG
MCHC RBC-ENTMCNC: 31 GM/DL
MCV RBC AUTO: 98.7 FL
MONOCYTES # BLD AUTO: 0.39 K/UL
MONOCYTES NFR BLD AUTO: 9.4 %
NEUTROPHILS # BLD AUTO: 2.22 K/UL
NEUTROPHILS NFR BLD AUTO: 53.8 %
PARATHYROID HORMONE INTACT: 211 PG/ML
PHOSPHATE SERPL-MCNC: 3.1 MG/DL
PLATELET # BLD AUTO: 548 K/UL
POTASSIUM SERPL-SCNC: 5.7 MMOL/L
PROT SERPL-MCNC: 6.5 G/DL
RBC # BLD: 3.07 M/UL
RBC # FLD: 15.7 %
SODIUM SERPL-SCNC: 143 MMOL/L
TACROLIMUS SERPL-MCNC: 7.9 NG/ML
URATE SERPL-MCNC: 4.9 MG/DL
WBC # FLD AUTO: 4.13 K/UL

## 2019-11-26 ENCOUNTER — APPOINTMENT (OUTPATIENT)
Dept: TRANSPLANT | Facility: CLINIC | Age: 39
End: 2019-11-26
Payer: MEDICARE

## 2019-11-26 VITALS
HEIGHT: 64 IN | HEART RATE: 85 BPM | RESPIRATION RATE: 14 BRPM | DIASTOLIC BLOOD PRESSURE: 71 MMHG | WEIGHT: 123.1 LBS | BODY MASS INDEX: 21.02 KG/M2 | TEMPERATURE: 97.8 F | SYSTOLIC BLOOD PRESSURE: 121 MMHG | OXYGEN SATURATION: 100 %

## 2019-11-26 PROCEDURE — 99214 OFFICE O/P EST MOD 30 MIN: CPT | Mod: 24

## 2019-11-27 ENCOUNTER — MOBILE ON CALL (OUTPATIENT)
Age: 39
End: 2019-11-27

## 2019-11-27 LAB
ALBUMIN SERPL ELPH-MCNC: 4.2 G/DL
ALP BLD-CCNC: 287 U/L
ALT SERPL-CCNC: 6 U/L
ANION GAP SERPL CALC-SCNC: 12 MMOL/L
APPEARANCE: CLEAR
AST SERPL-CCNC: 11 U/L
BACTERIA: NEGATIVE
BASOPHILS # BLD AUTO: 0.05 K/UL
BASOPHILS NFR BLD AUTO: 0.9 %
BILIRUB SERPL-MCNC: 0.2 MG/DL
BILIRUBIN URINE: NEGATIVE
BLOOD URINE: NEGATIVE
BUN SERPL-MCNC: 20 MG/DL
CALCIUM SERPL-MCNC: 10.3 MG/DL
CALCIUM SERPL-MCNC: 10.3 MG/DL
CHLORIDE SERPL-SCNC: 109 MMOL/L
CO2 SERPL-SCNC: 23 MMOL/L
COLOR: YELLOW
CREAT SERPL-MCNC: 1.57 MG/DL
CREAT SPEC-SCNC: 135 MG/DL
CREAT/PROT UR: 0.3 RATIO
EOSINOPHIL # BLD AUTO: 0.03 K/UL
EOSINOPHIL NFR BLD AUTO: 0.5 %
GLUCOSE QUALITATIVE U: NORMAL
GLUCOSE SERPL-MCNC: 137 MG/DL
HCT VFR BLD CALC: 30.7 %
HGB BLD-MCNC: 9.7 G/DL
HYALINE CASTS: 1 /LPF
IMM GRANULOCYTES NFR BLD AUTO: 1.5 %
KETONES URINE: NEGATIVE
LDH SERPL-CCNC: 210 U/L
LEUKOCYTE ESTERASE URINE: NEGATIVE
LYMPHOCYTES # BLD AUTO: 1.13 K/UL
LYMPHOCYTES NFR BLD AUTO: 19.3 %
MAGNESIUM SERPL-MCNC: 1.6 MG/DL
MAN DIFF?: NORMAL
MCHC RBC-ENTMCNC: 31.6 GM/DL
MCHC RBC-ENTMCNC: 31.6 PG
MCV RBC AUTO: 100 FL
MICROSCOPIC-UA: NORMAL
MONOCYTES # BLD AUTO: 0.37 K/UL
MONOCYTES NFR BLD AUTO: 6.3 %
NEUTROPHILS # BLD AUTO: 4.18 K/UL
NEUTROPHILS NFR BLD AUTO: 71.5 %
NITRITE URINE: NEGATIVE
PARATHYROID HORMONE INTACT: 193 PG/ML
PH URINE: 5.5
PHOSPHATE SERPL-MCNC: 3 MG/DL
PLATELET # BLD AUTO: 443 K/UL
POTASSIUM SERPL-SCNC: 5.5 MMOL/L
PROT SERPL-MCNC: 6 G/DL
PROT UR-MCNC: 39 MG/DL
PROTEIN URINE: ABNORMAL
RBC # BLD: 3.07 M/UL
RBC # FLD: 16.1 %
RED BLOOD CELLS URINE: 1 /HPF
SODIUM SERPL-SCNC: 144 MMOL/L
SPECIFIC GRAVITY URINE: 1.02
SQUAMOUS EPITHELIAL CELLS: 1 /HPF
TACROLIMUS SERPL-MCNC: 9.2 NG/ML
URATE SERPL-MCNC: 7 MG/DL
UROBILINOGEN URINE: NORMAL
WBC # FLD AUTO: 5.85 K/UL
WHITE BLOOD CELLS URINE: 2 /HPF

## 2019-12-02 LAB — BKV DNA SPEC QL NAA+PROBE: NOT DETECTED COPIES/ML

## 2019-12-05 ENCOUNTER — APPOINTMENT (OUTPATIENT)
Dept: NEPHROLOGY | Facility: CLINIC | Age: 39
End: 2019-12-05
Payer: MEDICARE

## 2019-12-05 VITALS
HEIGHT: 64 IN | SYSTOLIC BLOOD PRESSURE: 162 MMHG | DIASTOLIC BLOOD PRESSURE: 104 MMHG | OXYGEN SATURATION: 100 % | HEART RATE: 83 BPM | TEMPERATURE: 97.6 F | RESPIRATION RATE: 14 BRPM | WEIGHT: 126 LBS | BODY MASS INDEX: 21.51 KG/M2

## 2019-12-05 PROCEDURE — 99214 OFFICE O/P EST MOD 30 MIN: CPT

## 2019-12-05 RX ORDER — FUROSEMIDE 40 MG/1
40 TABLET ORAL DAILY
Qty: 3 | Refills: 0 | Status: DISCONTINUED | COMMUNITY
Start: 2019-11-19 | End: 2019-12-05

## 2019-12-05 RX ORDER — ERYTHROPOIETIN 10000 [IU]/ML
10000 INJECTION, SOLUTION INTRAVENOUS; SUBCUTANEOUS
Qty: 4 | Refills: 1 | Status: DISCONTINUED | COMMUNITY
Start: 2019-11-08 | End: 2019-12-05

## 2019-12-05 NOTE — ASSESSMENT
[FreeTextEntry1] : 1.  Renal transplant - Pt with DGF which has resolved.  Last creatinine 1.5.   \par 2.  Immunosuppression - simulect induction, cont tac for target 7-10,  MMF 500mgs BID and pred 5.  PT does not tolerate higher dose of MMF due to diarrhea.  No bactrim due to hyperkalemia, start mepron 1500mgs daily.  \par 3.  HTN - Resume nifedipine ER 30mgs daily. \par 4.  DM1 - increase novolog to 10 units TID.  Will schedule appt with endocrine. \par 5.  Hyperkalmemia - start lokelma 10gms daily in the afternoon.  Pt believes he has it at home. \par 6.  HCV - started Epclusa\par 7.  Anemia - pt on iron and procrit.  Gave 1 dose today. \par \par Pt will return for f/u next week.

## 2019-12-05 NOTE — PHYSICAL EXAM
[General Appearance - Alert] : alert [General Appearance - Well Nourished] : well nourished [General Appearance - In No Acute Distress] : in no acute distress [Extraocular Movements] : extraocular movements were intact [Sclera] : the sclera and conjunctiva were normal [Outer Ear] : the ears and nose were normal in appearance [Hearing Threshold Finger Rub Not [place]] : hearing was normal [Examination Of The Oral Cavity] : the lips and gums were normal [Neck Appearance] : the appearance of the neck was normal [Neck Cervical Mass (___cm)] : no neck mass was observed [Thyroid Diffuse Enlargement] : the thyroid was not enlarged [Jugular Venous Distention Increased] : there was no jugular-venous distention [Respiration, Rhythm And Depth] : normal respiratory rhythm and effort [Auscultation Breath Sounds / Voice Sounds] : lungs were clear to auscultation bilaterally [Chest Palpation] : palpation of the chest revealed no abnormalities [Heart Rate And Rhythm] : heart rate was normal and rhythm regular [Edema] : there was no peripheral edema [Heart Sounds] : normal S1 and S2 [Heart Sounds Gallop] : no gallops [Abdomen Soft] : soft [Bowel Sounds] : normal bowel sounds [Cervical Lymph Nodes Enlarged Posterior Bilaterally] : posterior cervical [Abdomen Tenderness] : non-tender [Cervical Lymph Nodes Enlarged Anterior Bilaterally] : anterior cervical [Supraclavicular Lymph Nodes Enlarged Bilaterally] : supraclavicular [No CVA Tenderness] : no ~M costovertebral angle tenderness [Abnormal Walk] : normal gait [Skin Color & Pigmentation] : normal skin color and pigmentation [Nail Clubbing] : no clubbing  or cyanosis of the fingernails [Musculoskeletal - Swelling] : no joint swelling seen [] : no rash [Skin Turgor] : normal skin turgor [No Focal Deficits] : no focal deficits [Cranial Nerves] : cranial nerves 2-12 were intact [Impaired Insight] : insight and judgment were intact [Oriented To Time, Place, And Person] : oriented to person, place, and time [Affect] : the affect was normal [Mood] : the mood was normal

## 2019-12-05 NOTE — HISTORY OF PRESENT ILLNESS
[ Donor] :  donor [Positive/Positive] : Donor Positive/Recipient Positive [Basiliximab] : basiliximab [PHS Increased Risk] : [facility] Health Service increased risk [Hepatitis C] : hepatitis c [Brain Death] : brain death [Received on Pump] : organ received on pump [KDPI: ____] : Kidney Donor Profile Index: [unfilled] [Cold Ischemic Time: ____] : Cold Ischemic Time: [unfilled] [ABO Incompatible] : ABO compatible [TextBox_34] : DGF requiring HD for hyperkalemia [de-identified] : 39 male, hx DM1, HTN on dialysis since 2013.  did well post transplant but had DGF.  \par \par Interval events: \par Potassium has been elevated.  Currently no diarrhea.  Blood sugars high in the evening, ok in the morning.  Takes nifedipine occasionally.  Recieved procrit - last dose last week.

## 2019-12-06 LAB
ALBUMIN SERPL ELPH-MCNC: 4.3 G/DL
ALP BLD-CCNC: 241 U/L
ALT SERPL-CCNC: 8 U/L
ANION GAP SERPL CALC-SCNC: 11 MMOL/L
APPEARANCE: CLEAR
AST SERPL-CCNC: 12 U/L
BACTERIA: NEGATIVE
BASOPHILS # BLD AUTO: 0.06 K/UL
BASOPHILS NFR BLD AUTO: 0.8 %
BILIRUB SERPL-MCNC: 0.2 MG/DL
BILIRUBIN URINE: NEGATIVE
BKV DNA SPEC QL NAA+PROBE: NOT DETECTED COPIES/ML
BLOOD URINE: ABNORMAL
BUN SERPL-MCNC: 25 MG/DL
CALCIUM SERPL-MCNC: 10.3 MG/DL
CALCIUM SERPL-MCNC: 10.3 MG/DL
CHLORIDE SERPL-SCNC: 109 MMOL/L
CO2 SERPL-SCNC: 22 MMOL/L
COLOR: YELLOW
CREAT SERPL-MCNC: 1.39 MG/DL
CREAT SPEC-SCNC: 139 MG/DL
CREAT/PROT UR: 0.4 RATIO
EOSINOPHIL # BLD AUTO: 0.05 K/UL
EOSINOPHIL NFR BLD AUTO: 0.7 %
GLUCOSE QUALITATIVE U: NEGATIVE
GLUCOSE SERPL-MCNC: 169 MG/DL
HCT VFR BLD CALC: 36.5 %
HGB BLD-MCNC: 11.1 G/DL
HYALINE CASTS: 8 /LPF
IMM GRANULOCYTES NFR BLD AUTO: 1.1 %
KETONES URINE: NEGATIVE
LDH SERPL-CCNC: 230 U/L
LEUKOCYTE ESTERASE URINE: NEGATIVE
LYMPHOCYTES # BLD AUTO: 1.01 K/UL
LYMPHOCYTES NFR BLD AUTO: 13.6 %
MAGNESIUM SERPL-MCNC: 1.9 MG/DL
MAN DIFF?: NORMAL
MCHC RBC-ENTMCNC: 30.4 GM/DL
MCHC RBC-ENTMCNC: 30.9 PG
MCV RBC AUTO: 101.7 FL
MICROSCOPIC-UA: NORMAL
MONOCYTES # BLD AUTO: 0.3 K/UL
MONOCYTES NFR BLD AUTO: 4 %
NEUTROPHILS # BLD AUTO: 5.92 K/UL
NEUTROPHILS NFR BLD AUTO: 79.8 %
NITRITE URINE: NEGATIVE
PARATHYROID HORMONE INTACT: 192 PG/ML
PH URINE: 5.5
PHOSPHATE SERPL-MCNC: 3.3 MG/DL
PLATELET # BLD AUTO: 252 K/UL
POTASSIUM SERPL-SCNC: 5.5 MMOL/L
PROT SERPL-MCNC: 6.6 G/DL
PROT UR-MCNC: 48 MG/DL
PROTEIN URINE: ABNORMAL
RBC # BLD: 3.59 M/UL
RBC # FLD: 17.2 %
RED BLOOD CELLS URINE: 27 /HPF
SODIUM SERPL-SCNC: 142 MMOL/L
SPECIFIC GRAVITY URINE: 1.02
SQUAMOUS EPITHELIAL CELLS: 2 /HPF
TACROLIMUS SERPL-MCNC: 11.9 NG/ML
URATE SERPL-MCNC: 5.9 MG/DL
UROBILINOGEN URINE: NORMAL
WBC # FLD AUTO: 7.42 K/UL
WHITE BLOOD CELLS URINE: 3 /HPF

## 2019-12-10 ENCOUNTER — OTHER (OUTPATIENT)
Age: 39
End: 2019-12-10

## 2019-12-10 ENCOUNTER — APPOINTMENT (OUTPATIENT)
Dept: TRANSPLANT | Facility: CLINIC | Age: 39
End: 2019-12-10

## 2019-12-10 ENCOUNTER — APPOINTMENT (OUTPATIENT)
Dept: NEPHROLOGY | Facility: CLINIC | Age: 39
End: 2019-12-10
Payer: MEDICARE

## 2019-12-10 VITALS
TEMPERATURE: 98.3 F | WEIGHT: 126 LBS | HEIGHT: 64 IN | OXYGEN SATURATION: 98 % | RESPIRATION RATE: 14 BRPM | SYSTOLIC BLOOD PRESSURE: 156 MMHG | HEART RATE: 80 BPM | DIASTOLIC BLOOD PRESSURE: 98 MMHG | BODY MASS INDEX: 21.51 KG/M2

## 2019-12-10 LAB
ALBUMIN SERPL ELPH-MCNC: 4.4 G/DL
ALP BLD-CCNC: 208 U/L
ALT SERPL-CCNC: 8 U/L
ANION GAP SERPL CALC-SCNC: 10 MMOL/L
AST SERPL-CCNC: 14 U/L
BASOPHILS # BLD AUTO: 0.06 K/UL
BASOPHILS NFR BLD AUTO: 0.9 %
BILIRUB SERPL-MCNC: 0.3 MG/DL
BUN SERPL-MCNC: 21 MG/DL
CALCIUM SERPL-MCNC: 9.7 MG/DL
CHLORIDE SERPL-SCNC: 111 MMOL/L
CO2 SERPL-SCNC: 20 MMOL/L
CREAT SERPL-MCNC: 1.18 MG/DL
EOSINOPHIL # BLD AUTO: 0.1 K/UL
EOSINOPHIL NFR BLD AUTO: 1.5 %
GLUCOSE SERPL-MCNC: 179 MG/DL
HCT VFR BLD CALC: 37 %
HGB BLD-MCNC: 11.5 G/DL
IMM GRANULOCYTES NFR BLD AUTO: 0.7 %
LDH SERPL-CCNC: 219 U/L
LYMPHOCYTES # BLD AUTO: 0.87 K/UL
LYMPHOCYTES NFR BLD AUTO: 13 %
MAGNESIUM SERPL-MCNC: 1.7 MG/DL
MAN DIFF?: NORMAL
MCHC RBC-ENTMCNC: 31.1 GM/DL
MCHC RBC-ENTMCNC: 31.7 PG
MCV RBC AUTO: 101.9 FL
MONOCYTES # BLD AUTO: 0.33 K/UL
MONOCYTES NFR BLD AUTO: 4.9 %
NEUTROPHILS # BLD AUTO: 5.27 K/UL
NEUTROPHILS NFR BLD AUTO: 79 %
PHOSPHATE SERPL-MCNC: 3.1 MG/DL
PLATELET # BLD AUTO: 219 K/UL
POTASSIUM SERPL-SCNC: 5.3 MMOL/L
PROT SERPL-MCNC: 6.4 G/DL
RBC # BLD: 3.63 M/UL
RBC # FLD: 17.4 %
SODIUM SERPL-SCNC: 141 MMOL/L
TACROLIMUS SERPL-MCNC: 6.3 NG/ML
URATE SERPL-MCNC: 5.1 MG/DL
WBC # FLD AUTO: 6.68 K/UL

## 2019-12-10 PROCEDURE — 99214 OFFICE O/P EST MOD 30 MIN: CPT

## 2019-12-10 RX ORDER — SODIUM ZIRCONIUM CYCLOSILICATE 10 G/10G
10 POWDER, FOR SUSPENSION ORAL DAILY
Qty: 30 | Refills: 0 | Status: DISCONTINUED | COMMUNITY
Start: 2019-12-05 | End: 2019-12-10

## 2019-12-10 NOTE — ASSESSMENT
[FreeTextEntry1] : 1.  Renal transplant - Pt with DGF which has resolved.  Creatinine about 1.3 to 1.5.  \par 2.  Immunosuppression - simulect induction, cont tac for target 7-10,  MMF 500mgs BID and pred 5.  PT does not tolerate higher dose of MMF due to diarrhea.  No bactrim due to hyperkalemia, mepron 1500mgs daily.  \par 3.  HTN - R nifedipine ER 30mgs daily. \par 4.  DM1 - increase novolog to 12 units TID.  Will schedule appt with endocrine. \par 5.  Hyperkalmemia - start veltassa 8.6gms daily.  \par 6.  HCV - started Epclusa\par 7.  Anemia - improving.  Stop procrit.  \par \par Pt will return for f/u for blood next week and visit in 2 weeks.

## 2019-12-10 NOTE — PHYSICAL EXAM
[General Appearance - Alert] : alert [General Appearance - In No Acute Distress] : in no acute distress [General Appearance - Well Nourished] : well nourished [Sclera] : the sclera and conjunctiva were normal [Hearing Threshold Finger Rub Not [place]] : hearing was normal [Outer Ear] : the ears and nose were normal in appearance [Extraocular Movements] : extraocular movements were intact [Examination Of The Oral Cavity] : the lips and gums were normal [Neck Appearance] : the appearance of the neck was normal [Neck Cervical Mass (___cm)] : no neck mass was observed [Thyroid Diffuse Enlargement] : the thyroid was not enlarged [Jugular Venous Distention Increased] : there was no jugular-venous distention [Auscultation Breath Sounds / Voice Sounds] : lungs were clear to auscultation bilaterally [Respiration, Rhythm And Depth] : normal respiratory rhythm and effort [Heart Rate And Rhythm] : heart rate was normal and rhythm regular [Heart Sounds] : normal S1 and S2 [Chest Palpation] : palpation of the chest revealed no abnormalities [Bowel Sounds] : normal bowel sounds [Heart Sounds Gallop] : no gallops [Edema] : there was no peripheral edema [Abdomen Soft] : soft [Abdomen Tenderness] : non-tender [Cervical Lymph Nodes Enlarged Anterior Bilaterally] : anterior cervical [Cervical Lymph Nodes Enlarged Posterior Bilaterally] : posterior cervical [Supraclavicular Lymph Nodes Enlarged Bilaterally] : supraclavicular [Abnormal Walk] : normal gait [No CVA Tenderness] : no ~M costovertebral angle tenderness [Nail Clubbing] : no clubbing  or cyanosis of the fingernails [Skin Color & Pigmentation] : normal skin color and pigmentation [Musculoskeletal - Swelling] : no joint swelling seen [] : no rash [Cranial Nerves] : cranial nerves 2-12 were intact [Skin Turgor] : normal skin turgor [No Focal Deficits] : no focal deficits [Oriented To Time, Place, And Person] : oriented to person, place, and time [Mood] : the mood was normal [Affect] : the affect was normal [Impaired Insight] : insight and judgment were intact

## 2019-12-11 LAB
APPEARANCE: CLEAR
BACTERIA: NEGATIVE
BILIRUBIN URINE: NEGATIVE
BKV DNA SPEC QL NAA+PROBE: ABNORMAL COPIES/ML
BLOOD URINE: NEGATIVE
CMV DNA SPEC QL NAA+PROBE: NOT DETECTED
CMVPCR LOG: NOT DETECTED LOG10IU/ML
COLOR: NORMAL
CREAT SPEC-SCNC: 79 MG/DL
CREAT/PROT UR: 0.4 RATIO
GLUCOSE QUALITATIVE U: NORMAL
HYALINE CASTS: 1 /LPF
KETONES URINE: NEGATIVE
LEUKOCYTE ESTERASE URINE: NEGATIVE
MICROSCOPIC-UA: NORMAL
NITRITE URINE: NEGATIVE
PH URINE: 6
PROT UR-MCNC: 34 MG/DL
PROTEIN URINE: ABNORMAL
RED BLOOD CELLS URINE: 2 /HPF
SPECIFIC GRAVITY URINE: 1.02
SQUAMOUS EPITHELIAL CELLS: 1 /HPF
UROBILINOGEN URINE: NORMAL
WHITE BLOOD CELLS URINE: 1 /HPF

## 2019-12-19 ENCOUNTER — APPOINTMENT (OUTPATIENT)
Dept: TRANSPLANT | Facility: CLINIC | Age: 39
End: 2019-12-19

## 2019-12-19 ENCOUNTER — APPOINTMENT (OUTPATIENT)
Dept: NEPHROLOGY | Facility: CLINIC | Age: 39
End: 2019-12-19

## 2019-12-19 LAB
ALBUMIN SERPL ELPH-MCNC: 4.3 G/DL
ALP BLD-CCNC: 197 U/L
ALT SERPL-CCNC: 10 U/L
ANION GAP SERPL CALC-SCNC: 12 MMOL/L
APPEARANCE: CLEAR
AST SERPL-CCNC: 13 U/L
BACTERIA: NEGATIVE
BASOPHILS # BLD AUTO: 0.05 K/UL
BASOPHILS NFR BLD AUTO: 0.8 %
BILIRUB SERPL-MCNC: 0.3 MG/DL
BILIRUBIN URINE: NEGATIVE
BLOOD URINE: NORMAL
BUN SERPL-MCNC: 23 MG/DL
CALCIUM SERPL-MCNC: 10.2 MG/DL
CHLORIDE SERPL-SCNC: 110 MMOL/L
CO2 SERPL-SCNC: 19 MMOL/L
COLOR: NORMAL
CREAT SERPL-MCNC: 1.18 MG/DL
CREAT SPEC-SCNC: 87 MG/DL
CREAT/PROT UR: 0.4 RATIO
EOSINOPHIL # BLD AUTO: 0.13 K/UL
EOSINOPHIL NFR BLD AUTO: 2.1 %
GLUCOSE QUALITATIVE U: ABNORMAL
GLUCOSE SERPL-MCNC: 192 MG/DL
HCT VFR BLD CALC: 39.6 %
HGB BLD-MCNC: 12.4 G/DL
HYALINE CASTS: 0 /LPF
IMM GRANULOCYTES NFR BLD AUTO: 0.3 %
KETONES URINE: NEGATIVE
LDH SERPL-CCNC: 226 U/L
LEUKOCYTE ESTERASE URINE: NEGATIVE
LYMPHOCYTES # BLD AUTO: 1.09 K/UL
LYMPHOCYTES NFR BLD AUTO: 17.6 %
MAGNESIUM SERPL-MCNC: 1.6 MG/DL
MAN DIFF?: NORMAL
MCHC RBC-ENTMCNC: 31.3 GM/DL
MCHC RBC-ENTMCNC: 31.7 PG
MCV RBC AUTO: 101.3 FL
MICROSCOPIC-UA: NORMAL
MONOCYTES # BLD AUTO: 0.34 K/UL
MONOCYTES NFR BLD AUTO: 5.5 %
NEUTROPHILS # BLD AUTO: 4.56 K/UL
NEUTROPHILS NFR BLD AUTO: 73.7 %
NITRITE URINE: NEGATIVE
PH URINE: 6
PHOSPHATE SERPL-MCNC: 2.8 MG/DL
PLATELET # BLD AUTO: 213 K/UL
POTASSIUM SERPL-SCNC: 4.7 MMOL/L
PROT SERPL-MCNC: 6.5 G/DL
PROT UR-MCNC: 34 MG/DL
PROTEIN URINE: ABNORMAL
RBC # BLD: 3.91 M/UL
RBC # FLD: 16.6 %
RED BLOOD CELLS URINE: 4 /HPF
SODIUM SERPL-SCNC: 141 MMOL/L
SPECIFIC GRAVITY URINE: 1.02
SQUAMOUS EPITHELIAL CELLS: 1 /HPF
TACROLIMUS SERPL-MCNC: 8.4 NG/ML
URATE SERPL-MCNC: 4.8 MG/DL
UROBILINOGEN URINE: NORMAL
WBC # FLD AUTO: 6.19 K/UL
WHITE BLOOD CELLS URINE: 1 /HPF

## 2019-12-24 ENCOUNTER — APPOINTMENT (OUTPATIENT)
Dept: TRANSPLANT | Facility: CLINIC | Age: 39
End: 2019-12-24
Payer: MEDICARE

## 2019-12-24 VITALS
RESPIRATION RATE: 14 BRPM | DIASTOLIC BLOOD PRESSURE: 83 MMHG | SYSTOLIC BLOOD PRESSURE: 131 MMHG | TEMPERATURE: 97.3 F | HEIGHT: 64 IN | OXYGEN SATURATION: 97 % | WEIGHT: 127 LBS | HEART RATE: 80 BPM | BODY MASS INDEX: 21.68 KG/M2

## 2019-12-24 LAB — BKV DNA SPEC QL NAA+PROBE: NOT DETECTED COPIES/ML

## 2019-12-24 PROCEDURE — 99214 OFFICE O/P EST MOD 30 MIN: CPT | Mod: 24

## 2019-12-24 RX ORDER — PEN NEEDLE, DIABETIC 29 G X1/2"
32G X 4 MM NEEDLE, DISPOSABLE MISCELLANEOUS
Qty: 1 | Refills: 11 | Status: ACTIVE | COMMUNITY
Start: 2019-12-24 | End: 1900-01-01

## 2019-12-24 NOTE — REASON FOR VISIT
[Spouse] : spouse [FreeTextEntry3] :  donor kidney transplant [FreeTextEntry5] : October 22, 2019 [Follow-Up] : a follow-up visit

## 2019-12-24 NOTE — HISTORY OF PRESENT ILLNESS
[ Donor] :  donor [Basiliximab] : basiliximab [Positive/Positive] : Donor Positive/Recipient Positive [PHS Increased Risk] : [facility] Health Service increased risk [Hepatitis C] : hepatitis c [ABO Incompatible] : ABO compatible [Brain Death] : brain death [Received on Pump] : organ received on pump [KDPI: ____] : Kidney Donor Profile Index: [unfilled] [Cold Ischemic Time: ____] : Cold Ischemic Time: [unfilled] [TextBox_34] : DGF requiring HD for hyperkalemia [de-identified] : Blood sugars improved over last week. Feels well.  Urinating normally.  No medication issues. \par \par

## 2019-12-24 NOTE — ASSESSMENT
[FreeTextEntry1] : 1.  Renal transplant - Pt with DGF which has resolved.  \par \par 2.  Immunosuppression - simulect induction, cont tac for target 7-10,  MMF 500mgs BID (switch to Myfortic 720mg BID) and pred 5.  \par \par 3. PPx - No Bactrim 2/2 hyperK.  On Mepron. Needs G6PD testing. Valcyte.  Nystatin.  \par \par 3.  HTN - Nifedipine ER 30mgs daily. \par \par 4.  DM1 - inc control. \par \par 5.  Hyperkalmemia - on veltassa\par \par 6.  HCV - started Epclusa\par \par \par

## 2019-12-24 NOTE — PHYSICAL EXAM
[General Appearance - Alert] : alert [General Appearance - In No Acute Distress] : in no acute distress [Auscultation Breath Sounds / Voice Sounds] : lungs were clear to auscultation bilaterally [Chest Palpation] : palpation of the chest revealed no abnormalities [Heart Rate And Rhythm] : heart rate was normal and rhythm regular [Edema] : there was no peripheral edema [Abdomen Soft] : soft [Supraclavicular Lymph Nodes Enlarged Bilaterally] : supraclavicular [No Focal Deficits] : no focal deficits [Oriented To Time, Place, And Person] : oriented to person, place, and time

## 2019-12-25 LAB
ALBUMIN SERPL ELPH-MCNC: 4.5 G/DL
ALP BLD-CCNC: 181 U/L
ALT SERPL-CCNC: 8 U/L
ANION GAP SERPL CALC-SCNC: 11 MMOL/L
APPEARANCE: CLEAR
AST SERPL-CCNC: 15 U/L
BACTERIA: NEGATIVE
BASOPHILS # BLD AUTO: 0.06 K/UL
BASOPHILS NFR BLD AUTO: 1.2 %
BILIRUB SERPL-MCNC: 0.3 MG/DL
BILIRUBIN URINE: NEGATIVE
BLOOD URINE: NEGATIVE
BUN SERPL-MCNC: 20 MG/DL
CALCIUM SERPL-MCNC: 10.3 MG/DL
CALCIUM SERPL-MCNC: 10.3 MG/DL
CHLORIDE SERPL-SCNC: 105 MMOL/L
CO2 SERPL-SCNC: 24 MMOL/L
COLOR: NORMAL
CREAT SERPL-MCNC: 1.11 MG/DL
CREAT SPEC-SCNC: 74 MG/DL
CREAT/PROT UR: 0.3 RATIO
EOSINOPHIL # BLD AUTO: 0.13 K/UL
EOSINOPHIL NFR BLD AUTO: 2.6 %
GLUCOSE QUALITATIVE U: ABNORMAL
GLUCOSE SERPL-MCNC: 222 MG/DL
HCT VFR BLD CALC: 43.5 %
HGB BLD-MCNC: 13.5 G/DL
HYALINE CASTS: 1 /LPF
IMM GRANULOCYTES NFR BLD AUTO: 0.4 %
KETONES URINE: NEGATIVE
LDH SERPL-CCNC: 214 U/L
LEUKOCYTE ESTERASE URINE: NEGATIVE
LYMPHOCYTES # BLD AUTO: 0.96 K/UL
LYMPHOCYTES NFR BLD AUTO: 19.5 %
MAGNESIUM SERPL-MCNC: 1.7 MG/DL
MAN DIFF?: NORMAL
MCHC RBC-ENTMCNC: 31 GM/DL
MCHC RBC-ENTMCNC: 31.1 PG
MCV RBC AUTO: 100.2 FL
MICROSCOPIC-UA: NORMAL
MONOCYTES # BLD AUTO: 0.28 K/UL
MONOCYTES NFR BLD AUTO: 5.7 %
NEUTROPHILS # BLD AUTO: 3.48 K/UL
NEUTROPHILS NFR BLD AUTO: 70.6 %
NITRITE URINE: NEGATIVE
PARATHYROID HORMONE INTACT: 172 PG/ML
PH URINE: 6
PHOSPHATE SERPL-MCNC: 2.8 MG/DL
PLATELET # BLD AUTO: 282 K/UL
POTASSIUM SERPL-SCNC: 4.7 MMOL/L
PROT SERPL-MCNC: 6.7 G/DL
PROT UR-MCNC: 24 MG/DL
PROTEIN URINE: ABNORMAL
RBC # BLD: 4.34 M/UL
RBC # FLD: 15.9 %
RED BLOOD CELLS URINE: 2 /HPF
SODIUM SERPL-SCNC: 140 MMOL/L
SPECIFIC GRAVITY URINE: 1.02
SQUAMOUS EPITHELIAL CELLS: 1 /HPF
TACROLIMUS SERPL-MCNC: 5.9 NG/ML
URATE SERPL-MCNC: 4.4 MG/DL
UROBILINOGEN URINE: NORMAL
WBC # FLD AUTO: 4.93 K/UL
WHITE BLOOD CELLS URINE: 1 /HPF

## 2019-12-30 LAB — BKV DNA SPEC QL NAA+PROBE: NOT DETECTED COPIES/ML

## 2020-01-02 ENCOUNTER — LABORATORY RESULT (OUTPATIENT)
Age: 40
End: 2020-01-02

## 2020-01-02 ENCOUNTER — APPOINTMENT (OUTPATIENT)
Dept: NEPHROLOGY | Facility: CLINIC | Age: 40
End: 2020-01-02
Payer: MEDICARE

## 2020-01-02 ENCOUNTER — OTHER (OUTPATIENT)
Age: 40
End: 2020-01-02

## 2020-01-02 VITALS
RESPIRATION RATE: 14 BRPM | OXYGEN SATURATION: 99 % | HEART RATE: 91 BPM | HEIGHT: 64 IN | TEMPERATURE: 98.6 F | DIASTOLIC BLOOD PRESSURE: 73 MMHG | BODY MASS INDEX: 21.68 KG/M2 | WEIGHT: 127 LBS | SYSTOLIC BLOOD PRESSURE: 116 MMHG

## 2020-01-02 PROCEDURE — 99214 OFFICE O/P EST MOD 30 MIN: CPT

## 2020-01-02 RX ORDER — FERROUS SULFATE 325(65) MG
325 (65 FE) TABLET ORAL TWICE DAILY
Qty: 180 | Refills: 3 | Status: DISCONTINUED | COMMUNITY
Start: 2019-11-13 | End: 2020-01-02

## 2020-01-02 RX ORDER — NIFEDIPINE 30 MG/1
30 TABLET, EXTENDED RELEASE ORAL
Qty: 30 | Refills: 3 | Status: DISCONTINUED | COMMUNITY
Start: 2019-12-05 | End: 2020-01-02

## 2020-01-02 RX ORDER — NIFEDIPINE 30 MG/1
30 TABLET, EXTENDED RELEASE ORAL DAILY
Qty: 90 | Refills: 3 | Status: DISCONTINUED | COMMUNITY
Start: 2019-10-25 | End: 2020-01-02

## 2020-01-02 NOTE — ASSESSMENT
[FreeTextEntry1] : 1.  Renal transplant - Pt with DGF which has resolved.  Creatinine last 1.1\par 2.  Immunosuppression - simulect induction, cont tac for target 7-10,  MMF 500mgs BID and pred 5.  PT does not tolerate higher dose of MMF due to diarrhea - will try to change to myfortic 720mgs BID.  No bactrim due to hyperkalemia, mepron 1500mgs daily.  Pt took tacro before bloodwork today. \par 3.  HTN - change nifedipine to norvasc 5mgs daily  \par 4.  DM1 - control improved.  No changes. \par 5.  Hyperkalmemia - improved, continue veltassa 8.6gms daily.  \par 6.  HCV - cont Epclusa\par \par Pt will return for f/u in 2 weeks. \par check lipid panel next visit\par Stop iron.

## 2020-01-02 NOTE — REASON FOR VISIT
[Follow-Up] : a follow-up visit  [Spouse] : spouse [FreeTextEntry5] : October 22, 2019 [FreeTextEntry3] :  donor kidney transplant

## 2020-01-02 NOTE — PHYSICAL EXAM
[General Appearance - Alert] : alert [General Appearance - In No Acute Distress] : in no acute distress [Sclera] : the sclera and conjunctiva were normal [General Appearance - Well Nourished] : well nourished [Extraocular Movements] : extraocular movements were intact [Outer Ear] : the ears and nose were normal in appearance [Hearing Threshold Finger Rub Not [place]] : hearing was normal [Examination Of The Oral Cavity] : the lips and gums were normal [Neck Appearance] : the appearance of the neck was normal [Neck Cervical Mass (___cm)] : no neck mass was observed [Jugular Venous Distention Increased] : there was no jugular-venous distention [Thyroid Diffuse Enlargement] : the thyroid was not enlarged [Respiration, Rhythm And Depth] : normal respiratory rhythm and effort [Chest Palpation] : palpation of the chest revealed no abnormalities [Auscultation Breath Sounds / Voice Sounds] : lungs were clear to auscultation bilaterally [Heart Rate And Rhythm] : heart rate was normal and rhythm regular [Heart Sounds] : normal S1 and S2 [Heart Sounds Gallop] : no gallops [Edema] : there was no peripheral edema [Bowel Sounds] : normal bowel sounds [Abdomen Soft] : soft [Abdomen Tenderness] : non-tender [Cervical Lymph Nodes Enlarged Posterior Bilaterally] : posterior cervical [Cervical Lymph Nodes Enlarged Anterior Bilaterally] : anterior cervical [No CVA Tenderness] : no ~M costovertebral angle tenderness [Supraclavicular Lymph Nodes Enlarged Bilaterally] : supraclavicular [Abnormal Walk] : normal gait [Nail Clubbing] : no clubbing  or cyanosis of the fingernails [Musculoskeletal - Swelling] : no joint swelling seen [Skin Color & Pigmentation] : normal skin color and pigmentation [Skin Turgor] : normal skin turgor [] : no rash [Cranial Nerves] : cranial nerves 2-12 were intact [Impaired Insight] : insight and judgment were intact [Oriented To Time, Place, And Person] : oriented to person, place, and time [No Focal Deficits] : no focal deficits [Affect] : the affect was normal [Mood] : the mood was normal

## 2020-01-02 NOTE — HISTORY OF PRESENT ILLNESS
[ Donor] :  donor [Basiliximab] : basiliximab [Positive/Positive] : Donor Positive/Recipient Positive [PHS Increased Risk] : Public Health Service increased risk [Hepatitis C] : hepatitis c [Brain Death] : brain death [Received on Pump] : organ received on pump [KDPI: ____] : Kidney Donor Profile Index: [unfilled] [Cold Ischemic Time: ____] : Cold Ischemic Time: [unfilled] [TextBox_34] : DGF requiring HD for hyperkalemia [ABO Incompatible] : ABO compatible [de-identified] : 39 male, hx DM1, HTN on dialysis since 2013.  did well post transplant but had DGF.  \par \par Interval events: \par Potassium has improved, normal renal function.  Pt never started myfortic.  Taking nifedipine only when SBP > 150 - otherwise BP drops and he gets dizzy.

## 2020-01-03 LAB
ALBUMIN SERPL ELPH-MCNC: 4.4 G/DL
ALP BLD-CCNC: 170 U/L
ALT SERPL-CCNC: 7 U/L
ANION GAP SERPL CALC-SCNC: 12 MMOL/L
APPEARANCE: CLEAR
AST SERPL-CCNC: 15 U/L
BACTERIA: NEGATIVE
BASOPHILS # BLD AUTO: 0.04 K/UL
BASOPHILS NFR BLD AUTO: 0.7 %
BILIRUB SERPL-MCNC: 0.3 MG/DL
BILIRUBIN URINE: NEGATIVE
BLOOD URINE: NEGATIVE
BUN SERPL-MCNC: 23 MG/DL
CALCIUM SERPL-MCNC: 10.1 MG/DL
CALCIUM SERPL-MCNC: 10.1 MG/DL
CHLORIDE SERPL-SCNC: 103 MMOL/L
CHOLEST SERPL-MCNC: 187 MG/DL
CHOLEST/HDLC SERPL: 2.2 RATIO
CO2 SERPL-SCNC: 23 MMOL/L
COLOR: NORMAL
CREAT SERPL-MCNC: 1.35 MG/DL
CREAT SPEC-SCNC: 77 MG/DL
CREAT/PROT UR: 0.4 RATIO
EOSINOPHIL # BLD AUTO: 0.01 K/UL
EOSINOPHIL NFR BLD AUTO: 0.2 %
GLUCOSE QUALITATIVE U: ABNORMAL
GLUCOSE SERPL-MCNC: 388 MG/DL
HCT VFR BLD CALC: 44.5 %
HDLC SERPL-MCNC: 85 MG/DL
HGB BLD-MCNC: 13.9 G/DL
HYALINE CASTS: 0 /LPF
IMM GRANULOCYTES NFR BLD AUTO: 0.3 %
KETONES URINE: NEGATIVE
LDH SERPL-CCNC: 255 U/L
LDLC SERPL CALC-MCNC: 75 MG/DL
LEUKOCYTE ESTERASE URINE: NEGATIVE
LYMPHOCYTES # BLD AUTO: 0.47 K/UL
LYMPHOCYTES NFR BLD AUTO: 8 %
MAGNESIUM SERPL-MCNC: 1.9 MG/DL
MAN DIFF?: NORMAL
MCHC RBC-ENTMCNC: 31.2 GM/DL
MCHC RBC-ENTMCNC: 31.4 PG
MCV RBC AUTO: 100.5 FL
MICROSCOPIC-UA: NORMAL
MONOCYTES # BLD AUTO: 0.21 K/UL
MONOCYTES NFR BLD AUTO: 3.6 %
NEUTROPHILS # BLD AUTO: 5.14 K/UL
NEUTROPHILS NFR BLD AUTO: 87.2 %
NITRITE URINE: NEGATIVE
PARATHYROID HORMONE INTACT: 149 PG/ML
PH URINE: 6
PHOSPHATE SERPL-MCNC: 2.5 MG/DL
PLATELET # BLD AUTO: 314 K/UL
POTASSIUM SERPL-SCNC: 5.2 MMOL/L
PROT SERPL-MCNC: 6.7 G/DL
PROT UR-MCNC: 29 MG/DL
PROTEIN URINE: ABNORMAL
RBC # BLD: 4.43 M/UL
RBC # FLD: 14.6 %
RED BLOOD CELLS URINE: 5 /HPF
SODIUM SERPL-SCNC: 138 MMOL/L
SPECIFIC GRAVITY URINE: 1.02
SQUAMOUS EPITHELIAL CELLS: 1 /HPF
TACROLIMUS SERPL-MCNC: 41.5 NG/ML
TRIGL SERPL-MCNC: 137 MG/DL
URATE SERPL-MCNC: 5.6 MG/DL
UROBILINOGEN URINE: NORMAL
WBC # FLD AUTO: 5.89 K/UL
WHITE BLOOD CELLS URINE: 2 /HPF

## 2020-01-07 ENCOUNTER — APPOINTMENT (OUTPATIENT)
Dept: TRANSPLANT | Facility: CLINIC | Age: 40
End: 2020-01-07
Payer: MEDICARE

## 2020-01-07 VITALS
DIASTOLIC BLOOD PRESSURE: 107 MMHG | TEMPERATURE: 97.9 F | OXYGEN SATURATION: 98 % | RESPIRATION RATE: 16 BRPM | SYSTOLIC BLOOD PRESSURE: 155 MMHG | HEART RATE: 83 BPM

## 2020-01-07 LAB
ALBUMIN SERPL ELPH-MCNC: 4.3 G/DL
ALP BLD-CCNC: 171 U/L
ALT SERPL-CCNC: 11 U/L
ANION GAP SERPL CALC-SCNC: 12 MMOL/L
APPEARANCE: CLEAR
AST SERPL-CCNC: 16 U/L
BACTERIA: NEGATIVE
BASOPHILS # BLD AUTO: 0.04 K/UL
BASOPHILS NFR BLD AUTO: 0.6 %
BILIRUB SERPL-MCNC: 0.3 MG/DL
BILIRUBIN URINE: NEGATIVE
BKV DNA SPEC QL NAA+PROBE: NOT DETECTED COPIES/ML
BLOOD URINE: ABNORMAL
BUN SERPL-MCNC: 23 MG/DL
CALCIUM SERPL-MCNC: 10.8 MG/DL
CHLORIDE SERPL-SCNC: 108 MMOL/L
CO2 SERPL-SCNC: 24 MMOL/L
COLOR: NORMAL
CREAT SERPL-MCNC: 1.64 MG/DL
CREAT SPEC-SCNC: 151 MG/DL
CREAT/PROT UR: 0.3 RATIO
EOSINOPHIL # BLD AUTO: 0.08 K/UL
EOSINOPHIL NFR BLD AUTO: 1.2 %
GLUCOSE QUALITATIVE U: NEGATIVE
GLUCOSE SERPL-MCNC: 178 MG/DL
HCT VFR BLD CALC: 45.6 %
HCV RNA SERPL NAA DL=5-ACNC: NOT DETECTED IU/ML
HCV RNA SERPL NAA+PROBE-LOG IU: NOT DETECTED LOG10IU/ML
HGB BLD-MCNC: 14.1 G/DL
HYALINE CASTS: 3 /LPF
IMM GRANULOCYTES NFR BLD AUTO: 0.9 %
KETONES URINE: NEGATIVE
LDH SERPL-CCNC: 244 U/L
LEUKOCYTE ESTERASE URINE: NEGATIVE
LYMPHOCYTES # BLD AUTO: 0.99 K/UL
LYMPHOCYTES NFR BLD AUTO: 15.3 %
MAGNESIUM SERPL-MCNC: 1.9 MG/DL
MAN DIFF?: NORMAL
MCHC RBC-ENTMCNC: 30.9 GM/DL
MCHC RBC-ENTMCNC: 30.9 PG
MCV RBC AUTO: 99.8 FL
MICROSCOPIC-UA: NORMAL
MONOCYTES # BLD AUTO: 0.33 K/UL
MONOCYTES NFR BLD AUTO: 5.1 %
NEUTROPHILS # BLD AUTO: 4.99 K/UL
NEUTROPHILS NFR BLD AUTO: 76.9 %
NITRITE URINE: NEGATIVE
PH URINE: 6
PHOSPHATE SERPL-MCNC: 3.4 MG/DL
PLATELET # BLD AUTO: 302 K/UL
POTASSIUM SERPL-SCNC: 5.5 MMOL/L
PROT SERPL-MCNC: 6.6 G/DL
PROT UR-MCNC: 51 MG/DL
PROTEIN URINE: ABNORMAL
RBC # BLD: 4.57 M/UL
RBC # FLD: 14.6 %
RED BLOOD CELLS URINE: 6 /HPF
SODIUM SERPL-SCNC: 144 MMOL/L
SPECIFIC GRAVITY URINE: 1.02
SQUAMOUS EPITHELIAL CELLS: 2 /HPF
TACROLIMUS SERPL-MCNC: 13 NG/ML
UROBILINOGEN URINE: NORMAL
WBC # FLD AUTO: 6.49 K/UL
WHITE BLOOD CELLS URINE: 4 /HPF

## 2020-01-07 PROCEDURE — 99024 POSTOP FOLLOW-UP VISIT: CPT

## 2020-01-08 NOTE — PLAN
[FreeTextEntry1] : 1) decrease envarsus 15 > 12mg daily\par 2) monitor Cr next week, if increasing again, and tac level 8-10, arrange US +/- biopsy\par 2) ureteric stent - scheduled to be removed\par 3) finish course of epclusa

## 2020-01-08 NOTE — ASSESSMENT
[FreeTextEntry1] : Post DDRT 2.5 months\par \par Issues:\par 1) Kidney function - increasing Cr (1.6 today, 1.3 last week)\par -possibly related to elevated tac levels (40 last week, 13 today)\par 2) Immunosuppression - envarsus (on 13mg daily), myfortic, pred 5\par 3) HT - BP high today 150/100 but did not take BP meds\par 4) DM - improving blood sugars\par 5) prophylaxis - valgancyclovir, atovaquone, nystatin\par 6) ureteric stent in situ\par 7) HCV - PCR not detected, on epclusa\par

## 2020-01-08 NOTE — PHYSICAL EXAM
[In Left Forearm] : fistula/graft in left forearm [] : left dorsalis pedis palpable [Normal] : normal [FreeTextEntry1] : fully healed

## 2020-01-08 NOTE — REASON FOR VISIT
[Follow-Up] : a follow-up visit  [FreeTextEntry3] : Kidney Transplant -  Donor [FreeTextEntry5] : October 22, 2019

## 2020-01-08 NOTE — HISTORY OF PRESENT ILLNESS
[ Donor] :  donor [Basiliximab] : basiliximab [Positive/Positive] : Donor Positive/Recipient Positive [] : Yes [Other: ___] : [unfilled] [PHS Increased Risk] : Public Health Service increased risk [Hepatitis C] : hepatitis c [Brain Death] : brain death [Received on Pump] : organ received on pump [Cold Ischemic Time: ____] : Cold Ischemic Time: [unfilled] [KDPI: ____] : Kidney Donor Profile Index: [unfilled] [TextBox_34] : Donor kidney had dilated renal pelvis, therefore an anastomosis was constructed between the donor renal pelvis and the recipient bladder over a double-J ureteric stent. [de-identified] : Doing well\par good urine output\par improved blood sugar levels\par taking/tolerating epclusa\par BP meds changed from last visit but has not received norvasc from pharmacy yet\par \par Needs to have ureteric stent removed

## 2020-01-10 LAB
BKV DNA SPEC QL NAA+PROBE: NOT DETECTED COPIES/ML
CMV DNA SPEC QL NAA+PROBE: NOT DETECTED
CMVPCR LOG: NOT DETECTED LOG10IU/ML

## 2020-01-16 ENCOUNTER — APPOINTMENT (OUTPATIENT)
Dept: NEPHROLOGY | Facility: CLINIC | Age: 40
End: 2020-01-16
Payer: MEDICARE

## 2020-01-16 VITALS
HEART RATE: 83 BPM | OXYGEN SATURATION: 100 % | RESPIRATION RATE: 16 BRPM | SYSTOLIC BLOOD PRESSURE: 147 MMHG | DIASTOLIC BLOOD PRESSURE: 97 MMHG | TEMPERATURE: 98 F | BODY MASS INDEX: 22.2 KG/M2 | WEIGHT: 130 LBS | HEIGHT: 64 IN

## 2020-01-16 PROCEDURE — 99214 OFFICE O/P EST MOD 30 MIN: CPT

## 2020-01-16 RX ORDER — MYCOPHENOLATE MOFETIL 500 MG/1
500 TABLET ORAL
Qty: 180 | Refills: 3 | Status: DISCONTINUED | COMMUNITY
Start: 2019-10-24 | End: 2020-01-16

## 2020-01-16 RX ORDER — TACROLIMUS 1 MG/1
1 TABLET, EXTENDED RELEASE ORAL DAILY
Qty: 90 | Refills: 11 | Status: DISCONTINUED | COMMUNITY
Start: 2019-11-08 | End: 2020-01-16

## 2020-01-16 RX ORDER — EPOETIN ALFA-EPBX 10000 [IU]/ML
10000 INJECTION, SOLUTION INTRAVENOUS; SUBCUTANEOUS WEEKLY
Qty: 5 | Refills: 1 | Status: DISCONTINUED | COMMUNITY
Start: 2019-11-18 | End: 2020-01-16

## 2020-01-16 NOTE — ASSESSMENT
[FreeTextEntry1] : 1.  Renal transplant - Pt with DGF which has resolved.  Creatinine last 1.1\par 2.  Immunosuppression - simulect induction, cont tac for target 7-10,  myfortic 720mgs BID and pred 5.  No bactrim due to hyperkalemia, mepron 1500mgs daily.  Pt took tacro before bloodwork today. \par 3.  HTN - increase norvasc to 10mgs.  BP still high. \par 4.  DM1 - control improved.  No changes. \par 5.  Hyperkalmemia - improved, continue veltassa 8.6gms daily.  \par 6.  HCV - cont Epclusa\par \par Pt will return for f/u in 2 weeks. \par

## 2020-01-16 NOTE — HISTORY OF PRESENT ILLNESS
[ Donor] :  donor [Basiliximab] : basiliximab [PHS Increased Risk] : Public Health Service increased risk [Positive/Positive] : Donor Positive/Recipient Positive [Hepatitis C] : hepatitis c [Brain Death] : brain death [KDPI: ____] : Kidney Donor Profile Index: [unfilled] [Received on Pump] : organ received on pump [Cold Ischemic Time: ____] : Cold Ischemic Time: [unfilled] [ABO Incompatible] : ABO compatible [TextBox_34] : DGF requiring HD for hyperkalemia [de-identified] : 39 male, hx DM1, HTN on dialysis since 2013.  did well post transplant but had DGF.  \par \par Interval events: \par Potassium has improved, normal renal function.  Pt now on myfortic.  Blood pressure still high at home on norvasc 5mgs daily.

## 2020-01-16 NOTE — PHYSICAL EXAM
[General Appearance - Alert] : alert [General Appearance - In No Acute Distress] : in no acute distress [General Appearance - Well Nourished] : well nourished [Sclera] : the sclera and conjunctiva were normal [Outer Ear] : the ears and nose were normal in appearance [Extraocular Movements] : extraocular movements were intact [Hearing Threshold Finger Rub Not [place]] : hearing was normal [Examination Of The Oral Cavity] : the lips and gums were normal [Neck Appearance] : the appearance of the neck was normal [Jugular Venous Distention Increased] : there was no jugular-venous distention [Neck Cervical Mass (___cm)] : no neck mass was observed [Thyroid Diffuse Enlargement] : the thyroid was not enlarged [Respiration, Rhythm And Depth] : normal respiratory rhythm and effort [Auscultation Breath Sounds / Voice Sounds] : lungs were clear to auscultation bilaterally [Chest Palpation] : palpation of the chest revealed no abnormalities [Heart Rate And Rhythm] : heart rate was normal and rhythm regular [Heart Sounds] : normal S1 and S2 [Heart Sounds Gallop] : no gallops [Edema] : there was no peripheral edema [Bowel Sounds] : normal bowel sounds [Abdomen Soft] : soft [Abdomen Tenderness] : non-tender [Cervical Lymph Nodes Enlarged Posterior Bilaterally] : posterior cervical [Cervical Lymph Nodes Enlarged Anterior Bilaterally] : anterior cervical [Supraclavicular Lymph Nodes Enlarged Bilaterally] : supraclavicular [No CVA Tenderness] : no ~M costovertebral angle tenderness [Abnormal Walk] : normal gait [Musculoskeletal - Swelling] : no joint swelling seen [Skin Color & Pigmentation] : normal skin color and pigmentation [Nail Clubbing] : no clubbing  or cyanosis of the fingernails [Skin Turgor] : normal skin turgor [] : no rash [Cranial Nerves] : cranial nerves 2-12 were intact [No Focal Deficits] : no focal deficits [Impaired Insight] : insight and judgment were intact [Oriented To Time, Place, And Person] : oriented to person, place, and time [Affect] : the affect was normal [Mood] : the mood was normal

## 2020-01-17 LAB
25(OH)D3 SERPL-MCNC: 28.7 NG/ML
ALBUMIN SERPL ELPH-MCNC: 4.4 G/DL
ALP BLD-CCNC: 146 U/L
ALT SERPL-CCNC: 12 U/L
ANION GAP SERPL CALC-SCNC: 11 MMOL/L
APPEARANCE: CLEAR
AST SERPL-CCNC: 16 U/L
BACTERIA: NEGATIVE
BASOPHILS # BLD AUTO: 0.03 K/UL
BASOPHILS NFR BLD AUTO: 0.6 %
BILIRUB SERPL-MCNC: 0.3 MG/DL
BILIRUBIN URINE: NEGATIVE
BLOOD URINE: NORMAL
BUN SERPL-MCNC: 18 MG/DL
CALCIUM SERPL-MCNC: 10.5 MG/DL
CALCIUM SERPL-MCNC: 10.5 MG/DL
CHLORIDE SERPL-SCNC: 105 MMOL/L
CHOLEST SERPL-MCNC: 183 MG/DL
CHOLEST/HDLC SERPL: 2.4 RATIO
CMV DNA SPEC QL NAA+PROBE: NOT DETECTED
CMVPCR LOG: NOT DETECTED LOG10IU/ML
CO2 SERPL-SCNC: 26 MMOL/L
COLOR: YELLOW
CREAT SERPL-MCNC: 1.38 MG/DL
CREAT SPEC-SCNC: 156 MG/DL
CREAT/PROT UR: 0.4 RATIO
EOSINOPHIL # BLD AUTO: 0.06 K/UL
EOSINOPHIL NFR BLD AUTO: 1.2 %
ESTIMATED AVERAGE GLUCOSE: 148 MG/DL
GLUCOSE QUALITATIVE U: ABNORMAL
GLUCOSE SERPL-MCNC: 237 MG/DL
HBA1C MFR BLD HPLC: 6.8 %
HCT VFR BLD CALC: 49 %
HDLC SERPL-MCNC: 77 MG/DL
HGB BLD-MCNC: 14.9 G/DL
HYALINE CASTS: 3 /LPF
IMM GRANULOCYTES NFR BLD AUTO: 0.6 %
KETONES URINE: NEGATIVE
LDH SERPL-CCNC: 256 U/L
LDLC SERPL CALC-MCNC: 79 MG/DL
LEUKOCYTE ESTERASE URINE: NEGATIVE
LYMPHOCYTES # BLD AUTO: 0.72 K/UL
LYMPHOCYTES NFR BLD AUTO: 14.6 %
MAGNESIUM SERPL-MCNC: 1.7 MG/DL
MAN DIFF?: NORMAL
MCHC RBC-ENTMCNC: 30.4 GM/DL
MCHC RBC-ENTMCNC: 30.9 PG
MCV RBC AUTO: 101.7 FL
MICROSCOPIC-UA: NORMAL
MONOCYTES # BLD AUTO: 0.27 K/UL
MONOCYTES NFR BLD AUTO: 5.5 %
NEUTROPHILS # BLD AUTO: 3.82 K/UL
NEUTROPHILS NFR BLD AUTO: 77.5 %
NITRITE URINE: NEGATIVE
PARATHYROID HORMONE INTACT: 148 PG/ML
PH URINE: 6
PHOSPHATE SERPL-MCNC: 2.7 MG/DL
PLATELET # BLD AUTO: 247 K/UL
POTASSIUM SERPL-SCNC: 5.1 MMOL/L
PROT SERPL-MCNC: 6.4 G/DL
PROT UR-MCNC: 54 MG/DL
PROTEIN URINE: ABNORMAL
RBC # BLD: 4.82 M/UL
RBC # FLD: 14.4 %
RED BLOOD CELLS URINE: 8 /HPF
SODIUM SERPL-SCNC: 141 MMOL/L
SPECIFIC GRAVITY URINE: 1.02
SQUAMOUS EPITHELIAL CELLS: 2 /HPF
TACROLIMUS SERPL-MCNC: 14 NG/ML
TRIGL SERPL-MCNC: 131 MG/DL
URATE SERPL-MCNC: 5.8 MG/DL
UROBILINOGEN URINE: NORMAL
WBC # FLD AUTO: 4.93 K/UL
WHITE BLOOD CELLS URINE: 2 /HPF

## 2020-01-21 ENCOUNTER — APPOINTMENT (OUTPATIENT)
Dept: TRANSPLANT | Facility: CLINIC | Age: 40
End: 2020-01-21

## 2020-01-21 ENCOUNTER — APPOINTMENT (OUTPATIENT)
Dept: TRANSPLANT | Facility: CLINIC | Age: 40
End: 2020-01-21
Payer: MEDICARE

## 2020-01-21 VITALS
BODY MASS INDEX: 22.2 KG/M2 | HEIGHT: 64 IN | HEART RATE: 80 BPM | SYSTOLIC BLOOD PRESSURE: 143 MMHG | RESPIRATION RATE: 16 BRPM | WEIGHT: 130 LBS | DIASTOLIC BLOOD PRESSURE: 91 MMHG | TEMPERATURE: 97.4 F | OXYGEN SATURATION: 100 %

## 2020-01-21 LAB — BKV DNA SPEC QL NAA+PROBE: 179 COPIES/ML

## 2020-01-21 PROCEDURE — 99214 OFFICE O/P EST MOD 30 MIN: CPT

## 2020-01-21 NOTE — REASON FOR VISIT
[Follow-Up] : follow-up visit [FreeTextEntry3] : Kidney transplant -  donor [FreeTextEntry5] : Oct 22, 2019

## 2020-01-21 NOTE — ASSESSMENT
[FreeTextEntry1] : 1) Post DDRT, Good Cr initially (maureen 1.1), but last couple of weeks had raised Cr.  Today Cr 1.4, but Tac is also 14.4 so likely due to high tac levels. \par -for decrease Tac (12->10mg)\par -recheck Cr next week\par -for US next week\par 2) immunosuppression: envarsus 10mg, myfortic, pred 5\par 3) Ureteric stent - scheduled to be removed\par 4) epclusa\par 5) BP - better controlled (135/85 today)\par 6) DM - good blood sugar control\par 7) prophylaxis - valgancyclovir, atovaquone, nystatin

## 2020-01-21 NOTE — HISTORY OF PRESENT ILLNESS
[de-identified] : Transplant Type:  donor \par Induction Agent(s): basiliximab \par CMV Status: Donor Positive/Recipient Positive \par Ureteral Stent: Yes \par Transplant Complications:  \par Donor kidney had dilated renal pelvis, therefore an anastomosis was constructed between the donor renal pelvis and the recipient bladder over a double-J ureteric stent. \par Donor Characteristics: [facility] increased risk, hepatitis c, brain death, organ received on pump \par Kidney Donor Profile Index: 44 \par Cold Ischemic Time: 30 \par \par Interval Events: \par \par Doing well\par Blood pressure better controlled with 10mg norvasc\par Cr lowest 1.1 but recently 1.6 > 1.3 Envarsus levels were high and decreased (currently 12mg)\par otherwise no issues

## 2020-01-24 LAB
ALBUMIN SERPL ELPH-MCNC: 4.5 G/DL
ALP BLD-CCNC: 151 U/L
ALT SERPL-CCNC: 14 U/L
ANION GAP SERPL CALC-SCNC: 13 MMOL/L
APPEARANCE: CLEAR
AST SERPL-CCNC: 16 U/L
BACTERIA: NEGATIVE
BASOPHILS # BLD AUTO: 0.04 K/UL
BASOPHILS NFR BLD AUTO: 0.9 %
BILIRUB SERPL-MCNC: 0.4 MG/DL
BILIRUBIN URINE: NEGATIVE
BKV DNA SPEC QL NAA+PROBE: 83 COPIES/ML
BLOOD URINE: NEGATIVE
BUN SERPL-MCNC: 16 MG/DL
CALCIUM SERPL-MCNC: 10.4 MG/DL
CALCIUM SERPL-MCNC: 10.4 MG/DL
CHLORIDE SERPL-SCNC: 105 MMOL/L
CO2 SERPL-SCNC: 24 MMOL/L
COLOR: NORMAL
CREAT SERPL-MCNC: 1.42 MG/DL
CREAT SPEC-SCNC: 103 MG/DL
CREAT/PROT UR: 0.4 RATIO
EOSINOPHIL # BLD AUTO: 0.08 K/UL
EOSINOPHIL NFR BLD AUTO: 1.7 %
GLUCOSE QUALITATIVE U: NEGATIVE
GLUCOSE SERPL-MCNC: 162 MG/DL
HCT VFR BLD CALC: 52.3 %
HGB BLD-MCNC: 15.6 G/DL
HYALINE CASTS: 1 /LPF
IMM GRANULOCYTES NFR BLD AUTO: 0.6 %
KETONES URINE: NEGATIVE
LDH SERPL-CCNC: 247 U/L
LEUKOCYTE ESTERASE URINE: NEGATIVE
LYMPHOCYTES # BLD AUTO: 1.09 K/UL
LYMPHOCYTES NFR BLD AUTO: 23.4 %
MAGNESIUM SERPL-MCNC: 1.6 MG/DL
MAN DIFF?: NORMAL
MCHC RBC-ENTMCNC: 29.8 GM/DL
MCHC RBC-ENTMCNC: 29.8 PG
MCV RBC AUTO: 99.8 FL
MICROSCOPIC-UA: NORMAL
MONOCYTES # BLD AUTO: 0.35 K/UL
MONOCYTES NFR BLD AUTO: 7.5 %
NEUTROPHILS # BLD AUTO: 3.07 K/UL
NEUTROPHILS NFR BLD AUTO: 65.9 %
NITRITE URINE: NEGATIVE
PARATHYROID HORMONE INTACT: 153 PG/ML
PH URINE: 6
PHOSPHATE SERPL-MCNC: 2.9 MG/DL
PLATELET # BLD AUTO: 250 K/UL
POTASSIUM SERPL-SCNC: 4.8 MMOL/L
PROT SERPL-MCNC: 6.7 G/DL
PROT UR-MCNC: 38 MG/DL
PROTEIN URINE: ABNORMAL
RBC # BLD: 5.24 M/UL
RBC # FLD: 13.8 %
RED BLOOD CELLS URINE: 2 /HPF
SODIUM SERPL-SCNC: 142 MMOL/L
SPECIFIC GRAVITY URINE: 1.02
SQUAMOUS EPITHELIAL CELLS: 1 /HPF
TACROLIMUS SERPL-MCNC: 14.4 NG/ML
URATE SERPL-MCNC: 5.6 MG/DL
UROBILINOGEN URINE: NORMAL
WBC # FLD AUTO: 4.66 K/UL
WHITE BLOOD CELLS URINE: 1 /HPF

## 2020-01-28 ENCOUNTER — FORM ENCOUNTER (OUTPATIENT)
Age: 40
End: 2020-01-28

## 2020-01-29 ENCOUNTER — OUTPATIENT (OUTPATIENT)
Dept: OUTPATIENT SERVICES | Facility: HOSPITAL | Age: 40
LOS: 1 days | End: 2020-01-29
Payer: MEDICARE

## 2020-01-29 ENCOUNTER — APPOINTMENT (OUTPATIENT)
Dept: TRANSPLANT | Facility: CLINIC | Age: 40
End: 2020-01-29

## 2020-01-29 ENCOUNTER — APPOINTMENT (OUTPATIENT)
Dept: ULTRASOUND IMAGING | Facility: HOSPITAL | Age: 40
End: 2020-01-29
Payer: MEDICARE

## 2020-01-29 DIAGNOSIS — Z94.0 KIDNEY TRANSPLANT STATUS: ICD-10-CM

## 2020-01-29 DIAGNOSIS — I77.0 ARTERIOVENOUS FISTULA, ACQUIRED: Chronic | ICD-10-CM

## 2020-01-29 DIAGNOSIS — Z98.89 OTHER SPECIFIED POSTPROCEDURAL STATES: Chronic | ICD-10-CM

## 2020-01-29 DIAGNOSIS — Z00.00 ENCOUNTER FOR GENERAL ADULT MEDICAL EXAMINATION WITHOUT ABNORMAL FINDINGS: ICD-10-CM

## 2020-01-29 PROCEDURE — 76776 US EXAM K TRANSPL W/DOPPLER: CPT | Mod: 26,RT

## 2020-01-29 PROCEDURE — 76776 US EXAM K TRANSPL W/DOPPLER: CPT

## 2020-01-30 ENCOUNTER — OTHER (OUTPATIENT)
Age: 40
End: 2020-01-30

## 2020-01-30 LAB
ALBUMIN SERPL ELPH-MCNC: 4.6 G/DL
ALP BLD-CCNC: 146 U/L
ALT SERPL-CCNC: 9 U/L
ANION GAP SERPL CALC-SCNC: 12 MMOL/L
APPEARANCE: CLEAR
AST SERPL-CCNC: 17 U/L
BACTERIA: NEGATIVE
BASOPHILS # BLD AUTO: 0.06 K/UL
BASOPHILS NFR BLD AUTO: 1.7 %
BILIRUB SERPL-MCNC: 0.4 MG/DL
BILIRUBIN URINE: NEGATIVE
BLOOD URINE: NORMAL
BUN SERPL-MCNC: 15 MG/DL
CALCIUM SERPL-MCNC: 10.4 MG/DL
CHLORIDE SERPL-SCNC: 107 MMOL/L
CMV DNA SPEC QL NAA+PROBE: NOT DETECTED
CMVPCR LOG: NOT DETECTED LOG10IU/ML
CO2 SERPL-SCNC: 24 MMOL/L
COLOR: NORMAL
CREAT SERPL-MCNC: 1.22 MG/DL
CREAT SPEC-SCNC: 98 MG/DL
CREAT/PROT UR: 0.4 RATIO
EOSINOPHIL # BLD AUTO: 0.08 K/UL
EOSINOPHIL NFR BLD AUTO: 2.2 %
GLUCOSE QUALITATIVE U: NEGATIVE
GLUCOSE SERPL-MCNC: 166 MG/DL
HCT VFR BLD CALC: 50.2 %
HGB BLD-MCNC: 15.7 G/DL
HYALINE CASTS: 0 /LPF
IMM GRANULOCYTES NFR BLD AUTO: 0.8 %
KETONES URINE: NEGATIVE
LDH SERPL-CCNC: 257 U/L
LEUKOCYTE ESTERASE URINE: NEGATIVE
LYMPHOCYTES # BLD AUTO: 1.02 K/UL
LYMPHOCYTES NFR BLD AUTO: 28.6 %
MAGNESIUM SERPL-MCNC: 1.6 MG/DL
MAN DIFF?: NORMAL
MCHC RBC-ENTMCNC: 30.4 PG
MCHC RBC-ENTMCNC: 31.3 GM/DL
MCV RBC AUTO: 97.3 FL
MICROSCOPIC-UA: NORMAL
MONOCYTES # BLD AUTO: 0.26 K/UL
MONOCYTES NFR BLD AUTO: 7.3 %
NEUTROPHILS # BLD AUTO: 2.12 K/UL
NEUTROPHILS NFR BLD AUTO: 59.4 %
NITRITE URINE: NEGATIVE
PH URINE: 6
PHOSPHATE SERPL-MCNC: 2.3 MG/DL
PLATELET # BLD AUTO: 243 K/UL
POTASSIUM SERPL-SCNC: 4.7 MMOL/L
PROT SERPL-MCNC: 6.8 G/DL
PROT UR-MCNC: 42 MG/DL
PROTEIN URINE: ABNORMAL
RBC # BLD: 5.16 M/UL
RBC # FLD: 13.7 %
RED BLOOD CELLS URINE: 9 /HPF
SODIUM SERPL-SCNC: 144 MMOL/L
SPECIFIC GRAVITY URINE: 1.02
SQUAMOUS EPITHELIAL CELLS: 1 /HPF
TACROLIMUS SERPL-MCNC: 10.4 NG/ML
URATE SERPL-MCNC: 5 MG/DL
UROBILINOGEN URINE: NORMAL
WBC # FLD AUTO: 3.57 K/UL
WHITE BLOOD CELLS URINE: 1 /HPF

## 2020-02-03 LAB — BKV DNA SPEC QL NAA+PROBE: 500 COPIES/ML

## 2020-02-05 ENCOUNTER — TRANSCRIPTION ENCOUNTER (OUTPATIENT)
Age: 40
End: 2020-02-05

## 2020-02-06 ENCOUNTER — OUTPATIENT (OUTPATIENT)
Dept: OUTPATIENT SERVICES | Facility: HOSPITAL | Age: 40
LOS: 1 days | End: 2020-02-06
Payer: MEDICARE

## 2020-02-06 ENCOUNTER — APPOINTMENT (OUTPATIENT)
Dept: TRANSPLANT | Facility: HOSPITAL | Age: 40
End: 2020-02-06

## 2020-02-06 VITALS
OXYGEN SATURATION: 100 % | TEMPERATURE: 97 F | RESPIRATION RATE: 18 BRPM | DIASTOLIC BLOOD PRESSURE: 87 MMHG | HEART RATE: 93 BPM | SYSTOLIC BLOOD PRESSURE: 147 MMHG

## 2020-02-06 VITALS
OXYGEN SATURATION: 98 % | HEART RATE: 93 BPM | TEMPERATURE: 98 F | DIASTOLIC BLOOD PRESSURE: 80 MMHG | RESPIRATION RATE: 16 BRPM | SYSTOLIC BLOOD PRESSURE: 127 MMHG

## 2020-02-06 DIAGNOSIS — Z94.0 KIDNEY TRANSPLANT STATUS: ICD-10-CM

## 2020-02-06 DIAGNOSIS — I77.0 ARTERIOVENOUS FISTULA, ACQUIRED: Chronic | ICD-10-CM

## 2020-02-06 DIAGNOSIS — Z98.89 OTHER SPECIFIED POSTPROCEDURAL STATES: Chronic | ICD-10-CM

## 2020-02-06 DIAGNOSIS — Z94.0 KIDNEY TRANSPLANT STATUS: Chronic | ICD-10-CM

## 2020-02-06 LAB
ANION GAP SERPL CALC-SCNC: 11 MMOL/L — SIGNIFICANT CHANGE UP (ref 5–17)
BUN SERPL-MCNC: 18 MG/DL — SIGNIFICANT CHANGE UP (ref 7–23)
CALCIUM SERPL-MCNC: 10 MG/DL — SIGNIFICANT CHANGE UP (ref 8.4–10.5)
CHLORIDE SERPL-SCNC: 104 MMOL/L — SIGNIFICANT CHANGE UP (ref 96–108)
CO2 SERPL-SCNC: 22 MMOL/L — SIGNIFICANT CHANGE UP (ref 22–31)
CREAT SERPL-MCNC: 1.44 MG/DL — HIGH (ref 0.5–1.3)
GLUCOSE SERPL-MCNC: 229 MG/DL — HIGH (ref 70–99)
HCT VFR BLD CALC: 50.9 % — HIGH (ref 39–50)
HGB BLD-MCNC: 15.8 G/DL — SIGNIFICANT CHANGE UP (ref 13–17)
MCHC RBC-ENTMCNC: 29.4 PG — SIGNIFICANT CHANGE UP (ref 27–34)
MCHC RBC-ENTMCNC: 31 GM/DL — LOW (ref 32–36)
MCV RBC AUTO: 94.6 FL — SIGNIFICANT CHANGE UP (ref 80–100)
NRBC # BLD: 0 /100 WBCS — SIGNIFICANT CHANGE UP (ref 0–0)
PLATELET # BLD AUTO: 250 K/UL — SIGNIFICANT CHANGE UP (ref 150–400)
POTASSIUM SERPL-MCNC: 4.5 MMOL/L — SIGNIFICANT CHANGE UP (ref 3.5–5.3)
POTASSIUM SERPL-SCNC: 4.5 MMOL/L — SIGNIFICANT CHANGE UP (ref 3.5–5.3)
RBC # BLD: 5.38 M/UL — SIGNIFICANT CHANGE UP (ref 4.2–5.8)
RBC # FLD: 13.2 % — SIGNIFICANT CHANGE UP (ref 10.3–14.5)
SODIUM SERPL-SCNC: 137 MMOL/L — SIGNIFICANT CHANGE UP (ref 135–145)
WBC # BLD: 3.48 K/UL — LOW (ref 3.8–10.5)
WBC # FLD AUTO: 3.48 K/UL — LOW (ref 3.8–10.5)

## 2020-02-06 PROCEDURE — 52310 CYSTOSCOPY AND TREATMENT: CPT

## 2020-02-06 PROCEDURE — 85027 COMPLETE CBC AUTOMATED: CPT

## 2020-02-06 PROCEDURE — 52310 CYSTOSCOPY AND TREATMENT: CPT | Mod: GC

## 2020-02-06 PROCEDURE — 82962 GLUCOSE BLOOD TEST: CPT

## 2020-02-06 PROCEDURE — 80048 BASIC METABOLIC PNL TOTAL CA: CPT

## 2020-02-06 RX ORDER — LATANOPROST 0.05 MG/ML
0 SOLUTION/ DROPS OPHTHALMIC; TOPICAL
Qty: 0 | Refills: 0 | DISCHARGE

## 2020-02-06 RX ORDER — DORZOLAMIDE HYDROCHLORIDE 20 MG/ML
1 SOLUTION/ DROPS OPHTHALMIC
Qty: 0 | Refills: 0 | DISCHARGE

## 2020-02-06 RX ORDER — DOCUSATE SODIUM 100 MG
100 CAPSULE ORAL
Qty: 0 | Refills: 0 | DISCHARGE

## 2020-02-06 RX ORDER — SODIUM CHLORIDE 9 MG/ML
3 INJECTION INTRAMUSCULAR; INTRAVENOUS; SUBCUTANEOUS EVERY 8 HOURS
Refills: 0 | Status: DISCONTINUED | OUTPATIENT
Start: 2020-02-06 | End: 2020-02-06

## 2020-02-06 RX ORDER — INSULIN DETEMIR 100/ML (3)
10 INSULIN PEN (ML) SUBCUTANEOUS
Qty: 0 | Refills: 0 | DISCHARGE

## 2020-02-06 RX ORDER — INSULIN ASPART 100 [IU]/ML
6 INJECTION, SOLUTION SUBCUTANEOUS
Qty: 0 | Refills: 0 | DISCHARGE

## 2020-02-06 RX ORDER — ONDANSETRON 8 MG/1
4 TABLET, FILM COATED ORAL ONCE
Refills: 0 | Status: DISCONTINUED | OUTPATIENT
Start: 2020-02-06 | End: 2020-02-06

## 2020-02-06 NOTE — ASU DISCHARGE PLAN (ADULT/PEDIATRIC) - CARE PROVIDER_API CALL
Anthony Gabriel)  Surgery  [address] [place] [address]  Phone: [phone]  Fax: [phone]  Established Patient  Follow Up Time: 2 weeks

## 2020-02-06 NOTE — H&P ADULT - NSHPPHYSICALEXAM_GEN_ALL_CORE
PHYSICAL EXAM:  Constitutional: Well developed / well nourished  Eyes: Anicteric, PERRLA  ENMT: nc/at  Neck: supple  Respiratory: CTA B/L  Cardiovascular: RRR  Gastrointestinal: Soft abdomen, non tender , ND  Genitourinary: oids spontaneously  Vascular: Palpable dp pulses bilaterally  Neurological: A&O x3  Skin: healed surgical scar  no erythema and evidence of infection noted  Musculoskeletal: Moving all extremities  Psychiatric: Responsive

## 2020-02-06 NOTE — H&P PST ADULT - ATTENDING COMMENTS
Patient is s/p DDRTx.  Ureteral stent placed at the time of transplant to prophylax against ureteral complications.  Patient now presents for routine removal.  We discussed the risks, benefits and alternatives of the surgery.  Outlined risks include, but are not limited to, bleeding, infection and damage to the transplant ureter, bladder or lower urinary tract.  The patient signed consent after all questions were satisfactorily answered.    Patient has had no changes to his clinical status since his last office visit.

## 2020-02-06 NOTE — H&P ADULT - NSHPREVIEWOFSYSTEMS_GEN_ALL_CORE
Review of systems  Gen: No weight changes, fatigue, fevers/chills, weakness  Skin: No rashes  Head/Eyes/Ears/Mouth: No headache; Normal hearing; Normal vision w/o blurriness; No sinus pain/discomfort, sore throat  Respiratory: No dyspnea, cough, wheezing, hemoptysis  CV: No chest pain, PND, orthopnea  GI: no abdominal pain, diarrhea, constipation, nausea, vomiting, melena, hematochezia  : No increased frequency, dysuria, hematuria, nocturia  MSK: No joint pain/swelling; no back pain; no edema  Neuro: No dizziness/lightheadedness, weakness, seizures, numbness, tingling  Heme: No easy bruising or bleeding  Endo: No heat/cold intolerance  Psych: No significant nervousness, anxiety, stress, depression  All other systems were reviewed and are negative, except as noted.

## 2020-02-06 NOTE — H&P ADULT - NSHPLABSRESULTS_GEN_ALL_CORE
39M with PMHx of IDDM (2000) on insulin since 2011, ESRD (on HD 2013 - 2019)  Via Left forearm AVF, HTN (2013), HLD, Retinopathy,  Cataract surgery and laser eye surgery s/p HCV+ DDRT with Simulect induction on 10/22, ureter stented.  Presents today for cysto stent removal.         cysto/ stent removal   -

## 2020-02-06 NOTE — H&P ADULT - HISTORY OF PRESENT ILLNESS
39M with PMHx of IDDM (2000) on insulin since 2011, ESRD (on HD 2013 - 2019)  Via Left forearm AVF, HTN (2013), HLD, Retinopathy,  Cataract surgery and laser eye surgery s/p HCV+ DDRT with Simulect induction on 10/22, ureter stented.  Presents today for cysto stent removal.

## 2020-02-06 NOTE — H&P ADULT - ASSESSMENT
39M with PMHx of IDDM (2000) on insulin since 2011, ESRD (on HD 2013 - 2019)  Via Left forearm AVF, HTN (2013), HLD, Retinopathy,  Cataract surgery and laser eye surgery s/p HCV+ DDRT with Simulect induction on 10/22, ureter stented.  Presents today for cysto stent removal.         cysto/ stent removal

## 2020-02-18 ENCOUNTER — APPOINTMENT (OUTPATIENT)
Dept: TRANSPLANT | Facility: CLINIC | Age: 40
End: 2020-02-18

## 2020-02-18 LAB
ALBUMIN SERPL ELPH-MCNC: 4.4 G/DL
ALP BLD-CCNC: 141 U/L
ALT SERPL-CCNC: 7 U/L
ANION GAP SERPL CALC-SCNC: 12 MMOL/L
APPEARANCE: CLEAR
AST SERPL-CCNC: 17 U/L
BACTERIA: NEGATIVE
BASOPHILS # BLD AUTO: 0.05 K/UL
BASOPHILS NFR BLD AUTO: 1.5 %
BILIRUB SERPL-MCNC: 0.3 MG/DL
BILIRUBIN URINE: NEGATIVE
BLOOD URINE: ABNORMAL
BUN SERPL-MCNC: 17 MG/DL
CALCIUM SERPL-MCNC: 10.1 MG/DL
CHLORIDE SERPL-SCNC: 106 MMOL/L
CO2 SERPL-SCNC: 25 MMOL/L
COLOR: NORMAL
CREAT SERPL-MCNC: 1.36 MG/DL
CREAT SPEC-SCNC: 70 MG/DL
CREAT/PROT UR: 0.6 RATIO
EOSINOPHIL # BLD AUTO: 0.04 K/UL
EOSINOPHIL NFR BLD AUTO: 1.2 %
GLUCOSE QUALITATIVE U: NORMAL
GLUCOSE SERPL-MCNC: 142 MG/DL
HCT VFR BLD CALC: 53 %
HGB BLD-MCNC: 16.9 G/DL
HYALINE CASTS: 0 /LPF
IMM GRANULOCYTES NFR BLD AUTO: 0.9 %
KETONES URINE: NEGATIVE
LDH SERPL-CCNC: 232 U/L
LEUKOCYTE ESTERASE URINE: NEGATIVE
LYMPHOCYTES # BLD AUTO: 0.76 K/UL
LYMPHOCYTES NFR BLD AUTO: 23.1 %
MAGNESIUM SERPL-MCNC: 1.6 MG/DL
MAN DIFF?: NORMAL
MCHC RBC-ENTMCNC: 30.3 PG
MCHC RBC-ENTMCNC: 31.9 GM/DL
MCV RBC AUTO: 95.2 FL
MICROSCOPIC-UA: NORMAL
MONOCYTES # BLD AUTO: 0.35 K/UL
MONOCYTES NFR BLD AUTO: 10.6 %
NEUTROPHILS # BLD AUTO: 2.06 K/UL
NEUTROPHILS NFR BLD AUTO: 62.7 %
NITRITE URINE: NEGATIVE
PH URINE: 6
PHOSPHATE SERPL-MCNC: 2.8 MG/DL
PLATELET # BLD AUTO: 256 K/UL
POTASSIUM SERPL-SCNC: 5.3 MMOL/L
PROT SERPL-MCNC: 6.4 G/DL
PROT UR-MCNC: 43 MG/DL
PROTEIN URINE: ABNORMAL
RBC # BLD: 5.57 M/UL
RBC # FLD: 13.2 %
RED BLOOD CELLS URINE: 86 /HPF
SODIUM SERPL-SCNC: 143 MMOL/L
SPECIFIC GRAVITY URINE: 1.01
SQUAMOUS EPITHELIAL CELLS: 1 /HPF
TACROLIMUS SERPL-MCNC: 9.6 NG/ML
URATE SERPL-MCNC: 5 MG/DL
UROBILINOGEN URINE: NORMAL
WBC # FLD AUTO: 3.29 K/UL
WHITE BLOOD CELLS URINE: 1 /HPF

## 2020-02-19 LAB
CMV DNA SPEC QL NAA+PROBE: NOT DETECTED
CMVPCR LOG: NOT DETECTED LOG10IU/ML

## 2020-02-20 LAB — BKV DNA SPEC QL NAA+PROBE: ABNORMAL COPIES/ML

## 2020-02-24 ENCOUNTER — APPOINTMENT (OUTPATIENT)
Dept: NEPHROLOGY | Facility: CLINIC | Age: 40
End: 2020-02-24
Payer: MEDICARE

## 2020-02-24 VITALS
RESPIRATION RATE: 16 BRPM | DIASTOLIC BLOOD PRESSURE: 79 MMHG | WEIGHT: 127 LBS | OXYGEN SATURATION: 98 % | TEMPERATURE: 97.6 F | HEART RATE: 87 BPM | BODY MASS INDEX: 21.68 KG/M2 | SYSTOLIC BLOOD PRESSURE: 133 MMHG | HEIGHT: 64 IN

## 2020-02-24 PROCEDURE — 99214 OFFICE O/P EST MOD 30 MIN: CPT

## 2020-02-24 RX ORDER — NYSTATIN 100000 [USP'U]/ML
100000 SUSPENSION ORAL 4 TIMES DAILY
Qty: 600 | Refills: 2 | Status: DISCONTINUED | COMMUNITY
Start: 2019-10-24 | End: 2020-02-24

## 2020-02-24 RX ORDER — VELPATASVIR AND SOFOSBUVIR 100; 400 MG/1; MG/1
400-100 TABLET, FILM COATED ORAL DAILY
Qty: 28 | Refills: 2 | Status: DISCONTINUED | COMMUNITY
Start: 2019-11-18 | End: 2020-02-24

## 2020-02-24 NOTE — HISTORY OF PRESENT ILLNESS
[ Donor] :  donor [Positive/Positive] : Donor Positive/Recipient Positive [Basiliximab] : basiliximab [PHS Increased Risk] : [facility] increased risk [Hepatitis C] : hepatitis c [KDPI: ____] : Kidney Donor Profile Index: [unfilled] [Brain Death] : brain death [Received on Pump] : organ received on pump [Cold Ischemic Time: ____] : Cold Ischemic Time: [unfilled] [TextBox_34] : DGF requiring HD for hyperkalemia [ABO Incompatible] : ABO compatible [de-identified] : 39 male, hx DM1, HTN on dialysis since 2013.  did well post transplant but had DGF.  \par \par Interval events: \par Potassium has improved, normal renal function.  Developed mild BP so myfortic was reduced to 360mgs BID and Envarsus dose reduced as well.  Was having some stomach cramps which resolved with reduction in myfortic.  Blood glucose has been controlled at home.

## 2020-02-24 NOTE — ASSESSMENT
[FreeTextEntry1] : 1.  Renal transplant - Pt with DGF which has resolved.  Creatinine nadired at 1. \par 2.  Immunosuppression - simulect induction, cont tac for target 7-10,  myfortic 360mgs BID and pred 5.  No bactrim due to hyperkalemia, mepron 1500mgs daily.  Lowered myfortic due to BK. \par 3.  HTN - controlled.  \par 4.  DM1 - control improved.  No changes. \par 5.  Hyperkalmemia - improved, continue veltassa 8.6gms daily.  \par 6.  HCV - completed therapy, last pcr ND\par 7.  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  Will monitor twice per week for now. \par \par Pt will return for f/u in 2 weeks for labs and 1 month for visit. \par

## 2020-02-24 NOTE — PHYSICAL EXAM
[General Appearance - In No Acute Distress] : in no acute distress [General Appearance - Alert] : alert [General Appearance - Well Nourished] : well nourished [Sclera] : the sclera and conjunctiva were normal [Extraocular Movements] : extraocular movements were intact [Examination Of The Oral Cavity] : the lips and gums were normal [Hearing Threshold Finger Rub Not [place]] : hearing was normal [Outer Ear] : the ears and nose were normal in appearance [Neck Appearance] : the appearance of the neck was normal [Jugular Venous Distention Increased] : there was no jugular-venous distention [Neck Cervical Mass (___cm)] : no neck mass was observed [Thyroid Diffuse Enlargement] : the thyroid was not enlarged [Respiration, Rhythm And Depth] : normal respiratory rhythm and effort [Auscultation Breath Sounds / Voice Sounds] : lungs were clear to auscultation bilaterally [Chest Palpation] : palpation of the chest revealed no abnormalities [Heart Rate And Rhythm] : heart rate was normal and rhythm regular [Edema] : there was no peripheral edema [Heart Sounds Gallop] : no gallops [Heart Sounds] : normal S1 and S2 [Abdomen Soft] : soft [Abdomen Tenderness] : non-tender [Bowel Sounds] : normal bowel sounds [Cervical Lymph Nodes Enlarged Posterior Bilaterally] : posterior cervical [Supraclavicular Lymph Nodes Enlarged Bilaterally] : supraclavicular [Cervical Lymph Nodes Enlarged Anterior Bilaterally] : anterior cervical [No CVA Tenderness] : no ~M costovertebral angle tenderness [Nail Clubbing] : no clubbing  or cyanosis of the fingernails [Abnormal Walk] : normal gait [Musculoskeletal - Swelling] : no joint swelling seen [Skin Turgor] : normal skin turgor [Skin Color & Pigmentation] : normal skin color and pigmentation [] : no rash [Cranial Nerves] : cranial nerves 2-12 were intact [Oriented To Time, Place, And Person] : oriented to person, place, and time [No Focal Deficits] : no focal deficits [Mood] : the mood was normal [Impaired Insight] : insight and judgment were intact [Affect] : the affect was normal

## 2020-02-28 LAB
25(OH)D3 SERPL-MCNC: 28.6 NG/ML
ALBUMIN SERPL ELPH-MCNC: 4.2 G/DL
ALP BLD-CCNC: 142 U/L
ALT SERPL-CCNC: 8 U/L
ANION GAP SERPL CALC-SCNC: 14 MMOL/L
APPEARANCE: CLEAR
AST SERPL-CCNC: 17 U/L
BACTERIA: NEGATIVE
BASOPHILS # BLD AUTO: 0.05 K/UL
BASOPHILS NFR BLD AUTO: 1.1 %
BILIRUB SERPL-MCNC: 0.2 MG/DL
BILIRUBIN URINE: NEGATIVE
BKV DNA SPEC QL NAA+PROBE: ABNORMAL COPIES/ML
BLOOD URINE: ABNORMAL
BUN SERPL-MCNC: 18 MG/DL
CALCIUM SERPL-MCNC: 10.1 MG/DL
CALCIUM SERPL-MCNC: 10.1 MG/DL
CHLORIDE SERPL-SCNC: 107 MMOL/L
CHOLEST SERPL-MCNC: 166 MG/DL
CHOLEST/HDLC SERPL: 2.8 RATIO
CMV DNA SPEC QL NAA+PROBE: NOT DETECTED
CO2 SERPL-SCNC: 22 MMOL/L
COLOR: NORMAL
CREAT SERPL-MCNC: 1.33 MG/DL
CREAT SPEC-SCNC: 81 MG/DL
CREAT/PROT UR: 0.5 RATIO
EOSINOPHIL # BLD AUTO: 0.03 K/UL
EOSINOPHIL NFR BLD AUTO: 0.7 %
ESTIMATED AVERAGE GLUCOSE: 174 MG/DL
GLUCOSE QUALITATIVE U: NEGATIVE
GLUCOSE SERPL-MCNC: 78 MG/DL
HBA1C MFR BLD HPLC: 7.7 %
HCT VFR BLD CALC: 52.8 %
HCV RNA SERPL NAA DL=5-ACNC: NOT DETECTED IU/ML
HCV RNA SERPL NAA+PROBE-LOG IU: NOT DETECTED LOG10IU/ML
HDLC SERPL-MCNC: 59 MG/DL
HGB BLD-MCNC: 16.4 G/DL
HYALINE CASTS: 0 /LPF
IMM GRANULOCYTES NFR BLD AUTO: 1.1 %
KETONES URINE: NEGATIVE
LDH SERPL-CCNC: 252 U/L
LDLC SERPL CALC-MCNC: 86 MG/DL
LEUKOCYTE ESTERASE URINE: NEGATIVE
LYMPHOCYTES # BLD AUTO: 0.58 K/UL
LYMPHOCYTES NFR BLD AUTO: 12.9 %
MAGNESIUM SERPL-MCNC: 1.6 MG/DL
MAN DIFF?: NORMAL
MCHC RBC-ENTMCNC: 30 PG
MCHC RBC-ENTMCNC: 31.1 GM/DL
MCV RBC AUTO: 96.5 FL
MICROSCOPIC-UA: NORMAL
MONOCYTES # BLD AUTO: 0.26 K/UL
MONOCYTES NFR BLD AUTO: 5.8 %
NEUTROPHILS # BLD AUTO: 3.51 K/UL
NEUTROPHILS NFR BLD AUTO: 78.4 %
NITRITE URINE: NEGATIVE
PARATHYROID HORMONE INTACT: 77 PG/ML
PH URINE: 6
PHOSPHATE SERPL-MCNC: 2.4 MG/DL
PLATELET # BLD AUTO: 266 K/UL
POTASSIUM SERPL-SCNC: 4.3 MMOL/L
PROT SERPL-MCNC: 6.4 G/DL
PROT UR-MCNC: 43 MG/DL
PROTEIN URINE: ABNORMAL
RBC # BLD: 5.47 M/UL
RBC # FLD: 13.2 %
RED BLOOD CELLS URINE: 150 /HPF
SODIUM SERPL-SCNC: 143 MMOL/L
SPECIFIC GRAVITY URINE: 1.02
SQUAMOUS EPITHELIAL CELLS: 0 /HPF
TACROLIMUS SERPL-MCNC: 10.5 NG/ML
TRIGL SERPL-MCNC: 103 MG/DL
URATE SERPL-MCNC: 5.7 MG/DL
UROBILINOGEN URINE: NORMAL
WBC # FLD AUTO: 4.48 K/UL
WHITE BLOOD CELLS URINE: 2 /HPF

## 2020-03-02 ENCOUNTER — APPOINTMENT (OUTPATIENT)
Dept: TRANSPLANT | Facility: CLINIC | Age: 40
End: 2020-03-02

## 2020-03-03 LAB
ALBUMIN SERPL ELPH-MCNC: 4.3 G/DL
ALP BLD-CCNC: 160 U/L
ALT SERPL-CCNC: 10 U/L
ANION GAP SERPL CALC-SCNC: 12 MMOL/L
APPEARANCE: ABNORMAL
AST SERPL-CCNC: 17 U/L
BACTERIA: NEGATIVE
BASOPHILS # BLD AUTO: 0.04 K/UL
BASOPHILS NFR BLD AUTO: 1.1 %
BILIRUB SERPL-MCNC: 0.3 MG/DL
BILIRUBIN URINE: NEGATIVE
BKV DNA SPEC QL NAA+PROBE: ABNORMAL COPIES/ML
BLOOD URINE: ABNORMAL
BUN SERPL-MCNC: 17 MG/DL
CALCIUM SERPL-MCNC: 10.3 MG/DL
CHLORIDE SERPL-SCNC: 106 MMOL/L
CO2 SERPL-SCNC: 25 MMOL/L
COLOR: YELLOW
CREAT SERPL-MCNC: 1.14 MG/DL
CREAT SPEC-SCNC: 82 MG/DL
CREAT/PROT UR: 0.4 RATIO
EOSINOPHIL # BLD AUTO: 0.03 K/UL
EOSINOPHIL NFR BLD AUTO: 0.8 %
GLUCOSE QUALITATIVE U: ABNORMAL
GLUCOSE SERPL-MCNC: 96 MG/DL
HCT VFR BLD CALC: 53.9 %
HGB BLD-MCNC: 16.3 G/DL
HYALINE CASTS: 0 /LPF
IMM GRANULOCYTES NFR BLD AUTO: 0.8 %
KETONES URINE: NEGATIVE
LDH SERPL-CCNC: 243 U/L
LEUKOCYTE ESTERASE URINE: NEGATIVE
LYMPHOCYTES # BLD AUTO: 0.49 K/UL
LYMPHOCYTES NFR BLD AUTO: 13.8 %
MAGNESIUM SERPL-MCNC: 1.7 MG/DL
MAN DIFF?: NORMAL
MCHC RBC-ENTMCNC: 28.6 PG
MCHC RBC-ENTMCNC: 30.2 GM/DL
MCV RBC AUTO: 94.7 FL
MICROSCOPIC-UA: NORMAL
MONOCYTES # BLD AUTO: 0.31 K/UL
MONOCYTES NFR BLD AUTO: 8.7 %
NEUTROPHILS # BLD AUTO: 2.66 K/UL
NEUTROPHILS NFR BLD AUTO: 74.8 %
NITRITE URINE: NEGATIVE
PH URINE: 7
PHOSPHATE SERPL-MCNC: 2.5 MG/DL
PLATELET # BLD AUTO: 279 K/UL
POTASSIUM SERPL-SCNC: 4.6 MMOL/L
PROT SERPL-MCNC: 6.3 G/DL
PROT UR-MCNC: 35 MG/DL
PROTEIN URINE: ABNORMAL
RBC # BLD: 5.69 M/UL
RBC # FLD: 13.1 %
RED BLOOD CELLS URINE: 70 /HPF
SODIUM SERPL-SCNC: 144 MMOL/L
SPECIFIC GRAVITY URINE: 1.01
SQUAMOUS EPITHELIAL CELLS: 1 /HPF
TACROLIMUS SERPL-MCNC: 7.2 NG/ML
URATE SERPL-MCNC: 3.9 MG/DL
UROBILINOGEN URINE: NORMAL
WBC # FLD AUTO: 3.56 K/UL
WHITE BLOOD CELLS URINE: 2 /HPF

## 2020-03-06 ENCOUNTER — FORM ENCOUNTER (OUTPATIENT)
Age: 40
End: 2020-03-06

## 2020-03-06 LAB
ALBUMIN SERPL ELPH-MCNC: 4.7 G/DL
ALP BLD-CCNC: 177 U/L
ALT SERPL-CCNC: 14 U/L
ANION GAP SERPL CALC-SCNC: 16 MMOL/L
APPEARANCE: CLEAR
AST SERPL-CCNC: 18 U/L
BACTERIA: NEGATIVE
BASOPHILS # BLD AUTO: 0.04 K/UL
BASOPHILS NFR BLD AUTO: 1.1 %
BILIRUB SERPL-MCNC: 0.4 MG/DL
BILIRUBIN URINE: NEGATIVE
BLOOD URINE: ABNORMAL
BUN SERPL-MCNC: 18 MG/DL
CALCIUM SERPL-MCNC: 10.3 MG/DL
CHLORIDE SERPL-SCNC: 101 MMOL/L
CO2 SERPL-SCNC: 22 MMOL/L
COLOR: NORMAL
CREAT SERPL-MCNC: 1.08 MG/DL
CREAT SPEC-SCNC: 44 MG/DL
CREAT/PROT UR: 0.6 RATIO
EOSINOPHIL # BLD AUTO: 0 K/UL
EOSINOPHIL NFR BLD AUTO: 0 %
GLUCOSE QUALITATIVE U: ABNORMAL
GLUCOSE SERPL-MCNC: 324 MG/DL
HCT VFR BLD CALC: 56.7 %
HGB BLD-MCNC: 17.1 G/DL
HYALINE CASTS: 1 /LPF
IMM GRANULOCYTES NFR BLD AUTO: 1.4 %
KETONES URINE: NORMAL
LDH SERPL-CCNC: 254 U/L
LEUKOCYTE ESTERASE URINE: NEGATIVE
LYMPHOCYTES # BLD AUTO: 0.47 K/UL
LYMPHOCYTES NFR BLD AUTO: 12.8 %
MAGNESIUM SERPL-MCNC: 1.6 MG/DL
MAN DIFF?: NORMAL
MCHC RBC-ENTMCNC: 29 PG
MCHC RBC-ENTMCNC: 30.2 GM/DL
MCV RBC AUTO: 96.1 FL
MICROSCOPIC-UA: NORMAL
MONOCYTES # BLD AUTO: 0.28 K/UL
MONOCYTES NFR BLD AUTO: 7.6 %
NEUTROPHILS # BLD AUTO: 2.83 K/UL
NEUTROPHILS NFR BLD AUTO: 77.1 %
NITRITE URINE: NEGATIVE
PH URINE: 6.5
PHOSPHATE SERPL-MCNC: 2.7 MG/DL
PLATELET # BLD AUTO: 298 K/UL
POTASSIUM SERPL-SCNC: 5.1 MMOL/L
PROT SERPL-MCNC: 7.2 G/DL
PROT UR-MCNC: 25 MG/DL
PROTEIN URINE: NORMAL
RBC # BLD: 5.9 M/UL
RBC # FLD: 13.1 %
RED BLOOD CELLS URINE: 45 /HPF
SODIUM SERPL-SCNC: 139 MMOL/L
SPECIFIC GRAVITY URINE: 1.01
SQUAMOUS EPITHELIAL CELLS: 1 /HPF
TACROLIMUS SERPL-MCNC: 20.5 NG/ML
URATE SERPL-MCNC: 3.9 MG/DL
UROBILINOGEN URINE: NORMAL
WBC # FLD AUTO: 3.67 K/UL
WHITE BLOOD CELLS URINE: 3 /HPF

## 2020-03-07 ENCOUNTER — APPOINTMENT (OUTPATIENT)
Dept: ULTRASOUND IMAGING | Facility: CLINIC | Age: 40
End: 2020-03-07
Payer: MEDICARE

## 2020-03-07 ENCOUNTER — OUTPATIENT (OUTPATIENT)
Dept: OUTPATIENT SERVICES | Facility: HOSPITAL | Age: 40
LOS: 1 days | End: 2020-03-07
Payer: MEDICARE

## 2020-03-07 ENCOUNTER — EMERGENCY (EMERGENCY)
Facility: HOSPITAL | Age: 40
LOS: 1 days | End: 2020-03-07
Admitting: EMERGENCY MEDICINE
Payer: MEDICARE

## 2020-03-07 DIAGNOSIS — I77.0 ARTERIOVENOUS FISTULA, ACQUIRED: Chronic | ICD-10-CM

## 2020-03-07 DIAGNOSIS — Z98.89 OTHER SPECIFIED POSTPROCEDURAL STATES: Chronic | ICD-10-CM

## 2020-03-07 DIAGNOSIS — Z94.0 KIDNEY TRANSPLANT STATUS: Chronic | ICD-10-CM

## 2020-03-07 DIAGNOSIS — Z94.0 KIDNEY TRANSPLANT STATUS: ICD-10-CM

## 2020-03-07 PROCEDURE — 99284 EMERGENCY DEPT VISIT MOD MDM: CPT

## 2020-03-07 PROCEDURE — 76857 US EXAM PELVIC LIMITED: CPT | Mod: 26

## 2020-03-07 PROCEDURE — 76857 US EXAM PELVIC LIMITED: CPT

## 2020-03-09 ENCOUNTER — APPOINTMENT (OUTPATIENT)
Dept: NEPHROLOGY | Facility: CLINIC | Age: 40
End: 2020-03-09
Payer: MEDICARE

## 2020-03-09 ENCOUNTER — APPOINTMENT (OUTPATIENT)
Dept: TRANSPLANT | Facility: CLINIC | Age: 40
End: 2020-03-09

## 2020-03-09 VITALS
TEMPERATURE: 98 F | SYSTOLIC BLOOD PRESSURE: 155 MMHG | OXYGEN SATURATION: 100 % | HEART RATE: 88 BPM | RESPIRATION RATE: 15 BRPM | BODY MASS INDEX: 22.71 KG/M2 | DIASTOLIC BLOOD PRESSURE: 100 MMHG | HEIGHT: 64 IN | WEIGHT: 133 LBS

## 2020-03-09 LAB
BACTERIA UR CULT: NORMAL
BKV DNA SPEC QL NAA+PROBE: ABNORMAL COPIES/ML

## 2020-03-09 PROCEDURE — 99214 OFFICE O/P EST MOD 30 MIN: CPT

## 2020-03-09 RX ORDER — CIPROFLOXACIN HYDROCHLORIDE 500 MG/1
500 TABLET, FILM COATED ORAL
Qty: 14 | Refills: 0 | Status: DISCONTINUED | COMMUNITY
Start: 2020-03-05 | End: 2020-03-09

## 2020-03-09 NOTE — ASSESSMENT
[FreeTextEntry1] : 1.  Renal transplant - Pt with DGF which has resolved.  Creatinine nadired at 1. \par 2.  Immunosuppression - simulect induction, cont tac for target 7-10,  myfortic 360mgs BID and pred 5.  No bactrim due to hyperkalemia, mepron 1500mgs daily.  Lowered myfortic due to BK. \par 3.  HTN - controlled.  \par 4.  DM1 - control improved.  No changes. \par 5.  Hyperkalmemia - improved, continue veltassa 8.6gms daily.  \par 6.  HCV - completed therapy, last pcr ND\par 7.  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  Will monitor every other week. \par 8.  Urinary retention - started flomax.  Pt has hightower, will send to urology.  Stop cipro.  \par \par Pt will return for f/u in 2 weeks for labs and 1 month for visit. \par

## 2020-03-09 NOTE — HISTORY OF PRESENT ILLNESS
[ Donor] :  donor [Basiliximab] : basiliximab [Positive/Positive] : Donor Positive/Recipient Positive [PHS Increased Risk] : [facility] [employer] [facility] increased risk [Hepatitis C] : hepatitis c [Brain Death] : brain death [Received on Pump] : organ received on pump [KDPI: ____] : Kidney Donor Profile Index: [unfilled] [Cold Ischemic Time: ____] : Cold Ischemic Time: [unfilled] [ABO Incompatible] : ABO compatible [TextBox_34] : DGF requiring HD for hyperkalemia [de-identified] : 39 male, hx DM1, HTN on dialysis since 2013.  did well post transplant but had DGF.  \par \par Interval events: \par End of last week pt had frequent urination.  Initially started cipro and checked urine culture.  Did not improve next day so flomax started.  Over weekend pt went to ER with trouble voiding and had hightower placed - 1.4 liters urine was in bladder.  Pt feels better.  had a large coffee today before coming in for visit. BP at home otherwise controlled.

## 2020-03-09 NOTE — PHYSICAL EXAM
[General Appearance - Alert] : alert [General Appearance - In No Acute Distress] : in no acute distress [General Appearance - Well Nourished] : well nourished [Sclera] : the sclera and conjunctiva were normal [Extraocular Movements] : extraocular movements were intact [Outer Ear] : the ears and nose were normal in appearance [Hearing Threshold Finger Rub Not [place]] : hearing was normal [Examination Of The Oral Cavity] : the lips and gums were normal [Neck Appearance] : the appearance of the neck was normal [Neck Cervical Mass (___cm)] : no neck mass was observed [Jugular Venous Distention Increased] : there was no jugular-venous distention [Thyroid Diffuse Enlargement] : the thyroid was not enlarged [Respiration, Rhythm And Depth] : normal respiratory rhythm and effort [Auscultation Breath Sounds / Voice Sounds] : lungs were clear to auscultation bilaterally [Chest Palpation] : palpation of the chest revealed no abnormalities [Heart Rate And Rhythm] : heart rate was normal and rhythm regular [Heart Sounds] : normal S1 and S2 [Heart Sounds Gallop] : no gallops [Edema] : there was no peripheral edema [Bowel Sounds] : normal bowel sounds [Abdomen Soft] : soft [Abdomen Tenderness] : non-tender [Cervical Lymph Nodes Enlarged Posterior Bilaterally] : posterior cervical [Cervical Lymph Nodes Enlarged Anterior Bilaterally] : anterior cervical [Supraclavicular Lymph Nodes Enlarged Bilaterally] : supraclavicular [No CVA Tenderness] : no ~M costovertebral angle tenderness [Abnormal Walk] : normal gait [Nail Clubbing] : no clubbing  or cyanosis of the fingernails [Musculoskeletal - Swelling] : no joint swelling seen [Skin Color & Pigmentation] : normal skin color and pigmentation [Skin Turgor] : normal skin turgor [] : no rash [Cranial Nerves] : cranial nerves 2-12 were intact [No Focal Deficits] : no focal deficits [Oriented To Time, Place, And Person] : oriented to person, place, and time [Impaired Insight] : insight and judgment were intact [Affect] : the affect was normal [Mood] : the mood was normal [FreeTextEntry1] : hightower catheter in place draining clear urine.

## 2020-03-10 LAB
ALBUMIN SERPL ELPH-MCNC: 4.3 G/DL
ALP BLD-CCNC: 149 U/L
ALT SERPL-CCNC: 10 U/L
ANION GAP SERPL CALC-SCNC: 14 MMOL/L
APPEARANCE: ABNORMAL
AST SERPL-CCNC: 15 U/L
BACTERIA: NEGATIVE
BASOPHILS # BLD AUTO: 0.05 K/UL
BASOPHILS NFR BLD AUTO: 1.3 %
BILIRUB SERPL-MCNC: 0.3 MG/DL
BILIRUBIN URINE: NEGATIVE
BLOOD URINE: ABNORMAL
BUN SERPL-MCNC: 15 MG/DL
CALCIUM SERPL-MCNC: 9.9 MG/DL
CHLORIDE SERPL-SCNC: 105 MMOL/L
CO2 SERPL-SCNC: 23 MMOL/L
COLOR: YELLOW
CREAT SERPL-MCNC: 1.32 MG/DL
CREAT SPEC-SCNC: 164 MG/DL
CREAT/PROT UR: 0.6 RATIO
EOSINOPHIL # BLD AUTO: 0.03 K/UL
EOSINOPHIL NFR BLD AUTO: 0.8 %
GLUCOSE QUALITATIVE U: ABNORMAL
GLUCOSE SERPL-MCNC: 207 MG/DL
HCT VFR BLD CALC: 53.8 %
HGB BLD-MCNC: 16.5 G/DL
HYALINE CASTS: 0 /LPF
IMM GRANULOCYTES NFR BLD AUTO: 0.8 %
KETONES URINE: NEGATIVE
LDH SERPL-CCNC: 233 U/L
LEUKOCYTE ESTERASE URINE: ABNORMAL
LYMPHOCYTES # BLD AUTO: 0.94 K/UL
LYMPHOCYTES NFR BLD AUTO: 23.9 %
MAGNESIUM SERPL-MCNC: 1.7 MG/DL
MAN DIFF?: NORMAL
MCHC RBC-ENTMCNC: 29.1 PG
MCHC RBC-ENTMCNC: 30.7 GM/DL
MCV RBC AUTO: 94.9 FL
MICROSCOPIC-UA: NORMAL
MONOCYTES # BLD AUTO: 0.62 K/UL
MONOCYTES NFR BLD AUTO: 15.7 %
NEUTROPHILS # BLD AUTO: 2.27 K/UL
NEUTROPHILS NFR BLD AUTO: 57.5 %
NITRITE URINE: NEGATIVE
PH URINE: 6
PHOSPHATE SERPL-MCNC: 3 MG/DL
PLATELET # BLD AUTO: 313 K/UL
POTASSIUM SERPL-SCNC: 4.3 MMOL/L
PROT SERPL-MCNC: 6.3 G/DL
PROT UR-MCNC: 90 MG/DL
PROTEIN URINE: ABNORMAL
RBC # BLD: 5.67 M/UL
RBC # FLD: 12.9 %
RED BLOOD CELLS URINE: 611 /HPF
SODIUM SERPL-SCNC: 142 MMOL/L
SPECIFIC GRAVITY URINE: 1.02
SQUAMOUS EPITHELIAL CELLS: 2 /HPF
TACROLIMUS SERPL-MCNC: 5.4 NG/ML
URATE SERPL-MCNC: 4.7 MG/DL
UROBILINOGEN URINE: NORMAL
WBC # FLD AUTO: 3.94 K/UL
WHITE BLOOD CELLS URINE: 23 /HPF

## 2020-03-10 RX ORDER — TACROLIMUS 1 MG/1
1 TABLET, EXTENDED RELEASE ORAL
Qty: 90 | Refills: 3 | Status: DISCONTINUED | COMMUNITY
Start: 2020-01-21 | End: 2020-03-10

## 2020-03-12 LAB
BKV DNA SPEC QL NAA+PROBE: ABNORMAL COPIES/ML
CMV DNA SPEC QL NAA+PROBE: NOT DETECTED
CMVPCR LOG: NOT DETECTED LOG10IU/ML

## 2020-03-13 ENCOUNTER — APPOINTMENT (OUTPATIENT)
Dept: UROLOGY | Facility: CLINIC | Age: 40
End: 2020-03-13
Payer: MEDICARE

## 2020-03-13 VITALS
BODY MASS INDEX: 23.57 KG/M2 | WEIGHT: 133 LBS | SYSTOLIC BLOOD PRESSURE: 142 MMHG | DIASTOLIC BLOOD PRESSURE: 92 MMHG | HEART RATE: 101 BPM | TEMPERATURE: 98 F | HEIGHT: 63 IN

## 2020-03-13 PROCEDURE — 99203 OFFICE O/P NEW LOW 30 MIN: CPT

## 2020-03-13 NOTE — HISTORY OF PRESENT ILLNESS
[FreeTextEntry1] : Mr. BRENTON ROSADO 39 year old  M  PMH DM, HTN, GERD,  glaucoma and PSH kidney transplant 10/23/19, bilat eye. Pt comes in bc of urinary retention. [place] [facility] [place] and pt was also started on flomax the day before bc he was having issues urinating. Pt comfortable and not in pain. Deneis fever, chills. This is the first time pt required hightower cath placement for urinary retenention. Denies [facility] of cancer. Nonsmoker. \par

## 2020-03-13 NOTE — PHYSICAL EXAM
[General Appearance - Well Developed] : well developed [General Appearance - Well Nourished] : well nourished [Normal Appearance] : normal appearance [Well Groomed] : well groomed [General Appearance - In No Acute Distress] : no acute distress [Edema] : no peripheral edema [Respiration, Rhythm And Depth] : normal respiratory rhythm and effort [Exaggerated Use Of Accessory Muscles For Inspiration] : no accessory muscle use [Abdomen Soft] : soft [Abdomen Tenderness] : non-tender [Costovertebral Angle Tenderness] : no ~M costovertebral angle tenderness [Urethral Meatus] : meatus normal [Penis Abnormality] : normal circumcised penis [Urinary Bladder Findings] : the bladder was normal on palpation [FreeTextEntry1] : hightower in place draining clear yellow urine [Normal Station and Gait] : the gait and station were normal for the patient's age [] : no rash [No Focal Deficits] : no focal deficits [Oriented To Time, Place, And Person] : oriented to person, place, and time [Affect] : the affect was normal [Mood] : the mood was normal [Not Anxious] : not anxious

## 2020-03-17 ENCOUNTER — APPOINTMENT (OUTPATIENT)
Dept: UROLOGY | Facility: CLINIC | Age: 40
End: 2020-03-17
Payer: MEDICARE

## 2020-03-17 VITALS
WEIGHT: 133 LBS | HEIGHT: 63 IN | TEMPERATURE: 98.2 F | HEART RATE: 99 BPM | SYSTOLIC BLOOD PRESSURE: 128 MMHG | BODY MASS INDEX: 23.57 KG/M2 | DIASTOLIC BLOOD PRESSURE: 82 MMHG

## 2020-03-17 PROCEDURE — 51700 IRRIGATION OF BLADDER: CPT

## 2020-03-17 NOTE — PHYSICAL EXAM
[General Appearance - Well Developed] : well developed [General Appearance - Well Nourished] : well nourished [Normal Appearance] : normal appearance [Well Groomed] : well groomed [General Appearance - In No Acute Distress] : no acute distress [Abdomen Soft] : soft [Abdomen Tenderness] : non-tender [Costovertebral Angle Tenderness] : no ~M costovertebral angle tenderness [Urethral Meatus] : meatus normal [Penis Abnormality] : normal circumcised penis [Urinary Bladder Findings] : the bladder was normal on palpation [FreeTextEntry1] : hightower in place draining clear yellow urine [Edema] : no peripheral edema [] : no respiratory distress [Respiration, Rhythm And Depth] : normal respiratory rhythm and effort [Exaggerated Use Of Accessory Muscles For Inspiration] : no accessory muscle use [Oriented To Time, Place, And Person] : oriented to person, place, and time [Affect] : the affect was normal [Mood] : the mood was normal [Not Anxious] : not anxious [Normal Station and Gait] : the gait and station were normal for the patient's age [No Focal Deficits] : no focal deficits

## 2020-03-17 NOTE — HISTORY OF PRESENT ILLNESS
[FreeTextEntry1] : Mr. BRENTON ROSADO 39 year old  M  PMH DM, HTN, GERD,  glaucoma and PSH kidney transplant 10/23/19, bilat eye. Pt comes in bc of urinary retention. [place] [facility] [place] and pt was also started on flomax the day before bc he was having issues urinating. Pt comfortable and not in pain. Deneis fever, chills. This is the first time pt required hightower cath placement for urinary retenention. Denies [facility] of cancer. Nonsmoker. \par \par Pt comes in today for TOV. Instilled 250 cc of sterile water pt urinated same.

## 2020-03-17 NOTE — ASSESSMENT
[FreeTextEntry1] : Pt comes in today for TOV. Instilled 250 cc of sterile water pt urinated same.\par \par Plan\par fu 1 month PVR

## 2020-03-19 LAB
HCV RNA SERPL NAA DL=5-ACNC: NOT DETECTED
HCV RNA SERPL NAA+PROBE-LOG IU: NOT DETECTED LOG10IU/ML

## 2020-03-30 ENCOUNTER — APPOINTMENT (OUTPATIENT)
Dept: NEPHROLOGY | Facility: CLINIC | Age: 40
End: 2020-03-30
Payer: MEDICARE

## 2020-03-30 PROCEDURE — 99214 OFFICE O/P EST MOD 30 MIN: CPT

## 2020-03-30 NOTE — PHYSICAL EXAM
[General Appearance - Alert] : alert [General Appearance - In No Acute Distress] : in no acute distress [General Appearance - Well Nourished] : well nourished [Sclera] : the sclera and conjunctiva were normal [Extraocular Movements] : extraocular movements were intact [Outer Ear] : the ears and nose were normal in appearance [Hearing Threshold Finger Rub Not [place]] : hearing was normal [Examination Of The Oral Cavity] : the lips and gums were normal [Neck Appearance] : the appearance of the neck was normal [Neck Cervical Mass (___cm)] : no neck mass was observed [Jugular Venous Distention Increased] : there was no jugular-venous distention [Thyroid Diffuse Enlargement] : the thyroid was not enlarged [Respiration, Rhythm And Depth] : normal respiratory rhythm and effort [Auscultation Breath Sounds / Voice Sounds] : lungs were clear to auscultation bilaterally [Chest Palpation] : palpation of the chest revealed no abnormalities [Heart Rate And Rhythm] : heart rate was normal and rhythm regular [Heart Sounds] : normal S1 and S2 [Heart Sounds Gallop] : no gallops [Edema] : there was no peripheral edema [Bowel Sounds] : normal bowel sounds [Abdomen Soft] : soft [Abdomen Tenderness] : non-tender [Cervical Lymph Nodes Enlarged Posterior Bilaterally] : posterior cervical [Cervical Lymph Nodes Enlarged Anterior Bilaterally] : anterior cervical [Supraclavicular Lymph Nodes Enlarged Bilaterally] : supraclavicular [No CVA Tenderness] : no ~M costovertebral angle tenderness [Abnormal Walk] : normal gait [Nail Clubbing] : no clubbing  or cyanosis of the fingernails [Musculoskeletal - Swelling] : no joint swelling seen [Skin Color & Pigmentation] : normal skin color and pigmentation [Skin Turgor] : normal skin turgor [] : no rash [Cranial Nerves] : cranial nerves 2-12 were intact [No Focal Deficits] : no focal deficits [Oriented To Time, Place, And Person] : oriented to person, place, and time [Impaired Insight] : insight and judgment were intact [Affect] : the affect was normal [Mood] : the mood was normal

## 2020-03-30 NOTE — ASSESSMENT
[FreeTextEntry1] : 1.  Renal transplant - Pt with DGF which has resolved.  Creatinine nadired at 1. Last value 1.3 - will check again now that hightower removed.  \par 2.  Immunosuppression - simulect induction, cont tac for target 7-10,  myfortic 360mgs BID and pred 5.  No bactrim due to hyperkalemia, mepron 1500mgs daily.  Lowered myfortic due to BK. \par 3.  HTN - controlled.  \par 4.  DM1 - control improved.  No changes. \par 5.  Hyperkalmemia - improved, continue veltassa 8.6gms daily.  \par 6.  HCV - completed therapy, last pcr ND\par 7.  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  Will monitor\par 8.  Urinary retention - on flomax and saw urology.  Will have f/u end of this month.    \par \par Pt will return for f/u in 1 month for labs. \par

## 2020-03-30 NOTE — HISTORY OF PRESENT ILLNESS
[ Donor] :  donor [Basiliximab] : basiliximab [Positive/Positive] : Donor Positive/Recipient Positive [PHS Increased Risk] : [facility] increased risk [Hepatitis C] : hepatitis c [Brain Death] : brain death [Received on Pump] : organ received on pump [KDPI: ____] : Kidney Donor Profile Index: [unfilled] [Cold Ischemic Time: ____] : Cold Ischemic Time: [unfilled] [ABO Incompatible] : ABO compatible [TextBox_34] : DGF requiring HD for hyperkalemia [de-identified] : 39 male, hx DM1, HTN on dialysis since 2013.  did well post transplant but had DGF.  \par \par 2/2020 pt developed urinary retention and required hightower catheter placement.  Saw urology and had catheter removed March 13th.  \par \par Interval events: \par Last week pt had some nausea and leg weakness.  Symptoms improved since then.  Pt had not been consistently taking food with meds but now is.  Also urinating ok but sometimes has mild pain at end of urinary stream.  blood pressure high today but pt did not take meds yet.  At home blood pressure much better.

## 2020-03-31 LAB
ALBUMIN SERPL ELPH-MCNC: 4 G/DL
ALP BLD-CCNC: 129 U/L
ALT SERPL-CCNC: 9 U/L
ANION GAP SERPL CALC-SCNC: 12 MMOL/L
APPEARANCE: CLEAR
AST SERPL-CCNC: 16 U/L
BACTERIA: NEGATIVE
BASOPHILS # BLD AUTO: 0.04 K/UL
BASOPHILS NFR BLD AUTO: 1.9 %
BILIRUB SERPL-MCNC: 0.5 MG/DL
BILIRUBIN URINE: NEGATIVE
BLOOD URINE: NORMAL
BUN SERPL-MCNC: 15 MG/DL
CALCIUM SERPL-MCNC: 10.5 MG/DL
CALCIUM SERPL-MCNC: 10.5 MG/DL
CHLORIDE SERPL-SCNC: 103 MMOL/L
CO2 SERPL-SCNC: 28 MMOL/L
COLOR: NORMAL
CREAT SERPL-MCNC: 1.09 MG/DL
CREAT SPEC-SCNC: 66 MG/DL
CREAT/PROT UR: 0.6 RATIO
EOSINOPHIL # BLD AUTO: 0.04 K/UL
EOSINOPHIL NFR BLD AUTO: 1.9 %
GLUCOSE QUALITATIVE U: ABNORMAL
GLUCOSE SERPL-MCNC: 202 MG/DL
HCT VFR BLD CALC: 50.7 %
HCV RNA SERPL NAA DL=5-ACNC: NOT DETECTED IU/ML
HCV RNA SERPL NAA+PROBE-LOG IU: NOT DETECTED LOG10IU/ML
HGB BLD-MCNC: 15.7 G/DL
HYALINE CASTS: 0 /LPF
IMM GRANULOCYTES NFR BLD AUTO: 0.9 %
KETONES URINE: NEGATIVE
LDH SERPL-CCNC: 333 U/L
LEUKOCYTE ESTERASE URINE: ABNORMAL
LYMPHOCYTES # BLD AUTO: 0.59 K/UL
LYMPHOCYTES NFR BLD AUTO: 27.3 %
MAGNESIUM SERPL-MCNC: 2 MG/DL
MAN DIFF?: NORMAL
MCHC RBC-ENTMCNC: 28.2 PG
MCHC RBC-ENTMCNC: 31 GM/DL
MCV RBC AUTO: 91 FL
MICROSCOPIC-UA: NORMAL
MONOCYTES # BLD AUTO: 0.42 K/UL
MONOCYTES NFR BLD AUTO: 19.4 %
NEUTROPHILS # BLD AUTO: 1.05 K/UL
NEUTROPHILS NFR BLD AUTO: 48.6 %
NITRITE URINE: NEGATIVE
PARATHYROID HORMONE INTACT: 162 PG/ML
PH URINE: 6.5
PHOSPHATE SERPL-MCNC: 2.5 MG/DL
PLATELET # BLD AUTO: 247 K/UL
POTASSIUM SERPL-SCNC: 4.9 MMOL/L
PROT SERPL-MCNC: 6.8 G/DL
PROT UR-MCNC: 42 MG/DL
PROTEIN URINE: ABNORMAL
RBC # BLD: 5.57 M/UL
RBC # FLD: 12.7 %
RED BLOOD CELLS URINE: 10 /HPF
SODIUM SERPL-SCNC: 143 MMOL/L
SPECIFIC GRAVITY URINE: 1.01
SQUAMOUS EPITHELIAL CELLS: 0 /HPF
TACROLIMUS SERPL-MCNC: 7 NG/ML
URATE SERPL-MCNC: 4.5 MG/DL
UROBILINOGEN URINE: NORMAL
WBC # FLD AUTO: 2.16 K/UL
WHITE BLOOD CELLS URINE: 22 /HPF

## 2020-04-01 DIAGNOSIS — N30.01 ACUTE CYSTITIS WITH HEMATURIA: ICD-10-CM

## 2020-04-01 DIAGNOSIS — Z87.440 PERSONAL HISTORY OF URINARY (TRACT) INFECTIONS: ICD-10-CM

## 2020-04-01 RX ORDER — MYCOPHENILIC ACID 180 MG/1
180 TABLET, DELAYED RELEASE ORAL
Qty: 60 | Refills: 11 | Status: DISCONTINUED | COMMUNITY
Start: 2019-12-24 | End: 2020-04-01

## 2020-04-02 LAB
BACTERIA UR CULT: ABNORMAL
BKV DNA SPEC QL NAA+PROBE: ABNORMAL COPIES/ML

## 2020-04-14 ENCOUNTER — APPOINTMENT (OUTPATIENT)
Dept: TRANSPLANT | Facility: CLINIC | Age: 40
End: 2020-04-14

## 2020-04-15 LAB
ALBUMIN SERPL ELPH-MCNC: 4.3 G/DL
ALP BLD-CCNC: 162 U/L
ALT SERPL-CCNC: 19 U/L
ANION GAP SERPL CALC-SCNC: 11 MMOL/L
APPEARANCE: CLEAR
AST SERPL-CCNC: 25 U/L
BACTERIA: NEGATIVE
BASOPHILS # BLD AUTO: 0.07 K/UL
BASOPHILS NFR BLD AUTO: 1.5 %
BILIRUB SERPL-MCNC: 0.4 MG/DL
BILIRUBIN URINE: NEGATIVE
BLOOD URINE: NORMAL
BUN SERPL-MCNC: 17 MG/DL
CALCIUM SERPL-MCNC: 10.5 MG/DL
CHLORIDE SERPL-SCNC: 103 MMOL/L
CO2 SERPL-SCNC: 27 MMOL/L
COLOR: NORMAL
CREAT SERPL-MCNC: 1.31 MG/DL
CREAT SPEC-SCNC: 79 MG/DL
CREAT/PROT UR: 0.6 RATIO
EOSINOPHIL # BLD AUTO: 0.06 K/UL
EOSINOPHIL NFR BLD AUTO: 1.3 %
GLUCOSE QUALITATIVE U: ABNORMAL
GLUCOSE SERPL-MCNC: 121 MG/DL
HCT VFR BLD CALC: 56.4 %
HGB BLD-MCNC: 16.9 G/DL
HYALINE CASTS: 2 /LPF
IMM GRANULOCYTES NFR BLD AUTO: 2 %
KETONES URINE: NEGATIVE
LDH SERPL-CCNC: 325 U/L
LEUKOCYTE ESTERASE URINE: NEGATIVE
LYMPHOCYTES # BLD AUTO: 1.35 K/UL
LYMPHOCYTES NFR BLD AUTO: 29.9 %
MAGNESIUM SERPL-MCNC: 2 MG/DL
MAN DIFF?: NORMAL
MCHC RBC-ENTMCNC: 27.5 PG
MCHC RBC-ENTMCNC: 30 GM/DL
MCV RBC AUTO: 91.7 FL
MICROSCOPIC-UA: NORMAL
MONOCYTES # BLD AUTO: 0.42 K/UL
MONOCYTES NFR BLD AUTO: 9.3 %
NEUTROPHILS # BLD AUTO: 2.53 K/UL
NEUTROPHILS NFR BLD AUTO: 56 %
NITRITE URINE: NEGATIVE
PH URINE: 6.5
PHOSPHATE SERPL-MCNC: 3.3 MG/DL
PLATELET # BLD AUTO: 301 K/UL
POTASSIUM SERPL-SCNC: 5.3 MMOL/L
PROT SERPL-MCNC: 6.9 G/DL
PROT UR-MCNC: 44 MG/DL
PROTEIN URINE: ABNORMAL
RBC # BLD: 6.15 M/UL
RBC # FLD: 14.1 %
RED BLOOD CELLS URINE: 6 /HPF
SODIUM SERPL-SCNC: 140 MMOL/L
SPECIFIC GRAVITY URINE: 1.02
SQUAMOUS EPITHELIAL CELLS: 0 /HPF
TACROLIMUS SERPL-MCNC: 7.4 NG/ML
URATE SERPL-MCNC: 5.6 MG/DL
UROBILINOGEN URINE: NORMAL
WBC # FLD AUTO: 4.52 K/UL
WHITE BLOOD CELLS URINE: 3 /HPF

## 2020-05-21 ENCOUNTER — APPOINTMENT (OUTPATIENT)
Dept: NEPHROLOGY | Facility: CLINIC | Age: 40
End: 2020-05-21
Payer: MEDICARE

## 2020-05-21 ENCOUNTER — APPOINTMENT (OUTPATIENT)
Dept: NEPHROLOGY | Facility: CLINIC | Age: 40
End: 2020-05-21

## 2020-05-21 ENCOUNTER — APPOINTMENT (OUTPATIENT)
Dept: TRANSPLANT | Facility: CLINIC | Age: 40
End: 2020-05-21

## 2020-05-21 VITALS
OXYGEN SATURATION: 100 % | TEMPERATURE: 97.5 F | HEART RATE: 88 BPM | DIASTOLIC BLOOD PRESSURE: 91 MMHG | HEIGHT: 63 IN | RESPIRATION RATE: 15 BRPM | WEIGHT: 142 LBS | BODY MASS INDEX: 25.16 KG/M2 | SYSTOLIC BLOOD PRESSURE: 140 MMHG

## 2020-05-21 LAB
ALBUMIN SERPL ELPH-MCNC: 4.6 G/DL
ALP BLD-CCNC: 187 U/L
ALT SERPL-CCNC: 20 U/L
ANION GAP SERPL CALC-SCNC: 9 MMOL/L
APPEARANCE: CLEAR
AST SERPL-CCNC: 21 U/L
BACTERIA: NEGATIVE
BASOPHILS # BLD AUTO: 0.03 K/UL
BASOPHILS NFR BLD AUTO: 0.8 %
BILIRUB SERPL-MCNC: 0.5 MG/DL
BILIRUBIN URINE: NEGATIVE
BLOOD URINE: NEGATIVE
BUN SERPL-MCNC: 18 MG/DL
CALCIUM SERPL-MCNC: 11 MG/DL
CHLORIDE SERPL-SCNC: 105 MMOL/L
CO2 SERPL-SCNC: 29 MMOL/L
COLOR: NORMAL
CREAT SERPL-MCNC: 1.16 MG/DL
CREAT SPEC-SCNC: 47 MG/DL
CREAT/PROT UR: 0.4 RATIO
EOSINOPHIL # BLD AUTO: 0.07 K/UL
EOSINOPHIL NFR BLD AUTO: 1.8 %
GLUCOSE QUALITATIVE U: ABNORMAL
GLUCOSE SERPL-MCNC: 116 MG/DL
HCT VFR BLD CALC: 53.8 %
HGB BLD-MCNC: 16.8 G/DL
HYALINE CASTS: 0 /LPF
IMM GRANULOCYTES NFR BLD AUTO: 1.3 %
KETONES URINE: NEGATIVE
LEUKOCYTE ESTERASE URINE: NEGATIVE
LYMPHOCYTES # BLD AUTO: 0.93 K/UL
LYMPHOCYTES NFR BLD AUTO: 23.7 %
MAGNESIUM SERPL-MCNC: 1.9 MG/DL
MAN DIFF?: NORMAL
MCHC RBC-ENTMCNC: 29.1 PG
MCHC RBC-ENTMCNC: 31.2 GM/DL
MCV RBC AUTO: 93.2 FL
MICROSCOPIC-UA: NORMAL
MONOCYTES # BLD AUTO: 0.43 K/UL
MONOCYTES NFR BLD AUTO: 10.9 %
NEUTROPHILS # BLD AUTO: 2.42 K/UL
NEUTROPHILS NFR BLD AUTO: 61.5 %
NITRITE URINE: NEGATIVE
PH URINE: 7
PHOSPHATE SERPL-MCNC: 2.7 MG/DL
PLATELET # BLD AUTO: 276 K/UL
POTASSIUM SERPL-SCNC: 5.1 MMOL/L
PROT SERPL-MCNC: 7.2 G/DL
PROT UR-MCNC: 19 MG/DL
PROTEIN URINE: NORMAL
RBC # BLD: 5.77 M/UL
RBC # FLD: 15.9 %
RED BLOOD CELLS URINE: 2 /HPF
SODIUM SERPL-SCNC: 142 MMOL/L
SPECIFIC GRAVITY URINE: 1.02
SQUAMOUS EPITHELIAL CELLS: 1 /HPF
UROBILINOGEN URINE: NORMAL
WBC # FLD AUTO: 3.93 K/UL
WHITE BLOOD CELLS URINE: 1 /HPF

## 2020-05-21 PROCEDURE — 99214 OFFICE O/P EST MOD 30 MIN: CPT

## 2020-05-21 RX ORDER — FAMOTIDINE 20 MG/1
20 TABLET, FILM COATED ORAL
Qty: 90 | Refills: 3 | Status: DISCONTINUED | COMMUNITY
Start: 2019-10-24 | End: 2020-05-21

## 2020-05-21 RX ORDER — AMOXICILLIN AND CLAVULANATE POTASSIUM 875; 125 MG/1; MG/1
875-125 TABLET, COATED ORAL
Qty: 14 | Refills: 0 | Status: DISCONTINUED | COMMUNITY
Start: 2020-04-01 | End: 2020-05-21

## 2020-05-21 RX ORDER — VALGANCICLOVIR HYDROCHLORIDE 450 MG/1
450 TABLET ORAL
Qty: 30 | Refills: 5 | Status: DISCONTINUED | COMMUNITY
Start: 2019-10-24 | End: 2020-05-21

## 2020-05-21 NOTE — HISTORY OF PRESENT ILLNESS
[ABO Incompatible] : ABO compatible [TextBox_34] : DGF requiring HD for hyperkalemia [de-identified] : 39 male, hx DM1, HTN on dialysis since 2013.  did well post transplant but had DGF.  \par \par 2/2020 pt developed urinary retention and required hightower catheter placement.  Saw urology and had catheter removed March 13th.  \par \par Interval events: \par Pt has 2 sisters and wife had COVID but he isolated and never developed symptoms.  all family now ok.  Pt is urinating normally.  Blood pressure ok at home - usually 120's.  Blood sugars has been elevated due to diet.    Has gained weight. \par

## 2020-05-21 NOTE — ASSESSMENT
[FreeTextEntry1] : 1.  Renal transplant - Pt with DGF which has resolved.  Creatinine nadired at 1. Last value 1.3.\par 2.  Immunosuppression - simulect induction, cont tac for target 4-6 and pred 5.  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   \par 3.  HTN - controlled.  \par 4.  DM1 - Blood glucose increasing, discussed improving diet. \par 5.  Hyperkalmemia - improved, continue veltassa 8.6gms daily.  \par 6.  HCV - completed therapy, last pcr ND\par 7.  BK - off of MMF.  Will repeat viral load. \par 8.  Urinary retention - on flomax and saw urology.  Now following with urology. \par \par Pt will return for f/u in 1 month for labs. \par stop valcyte and pepcid.

## 2020-05-27 LAB
BKV DNA SPEC QL NAA+PROBE: ABNORMAL COPIES/ML
CMV DNA SPEC QL NAA+PROBE: NOT DETECTED
CMVPCR LOG: NOT DETECTED LOG10IU/ML
LDH SERPL-CCNC: 331 U/L
TACROLIMUS SERPL-MCNC: 9 NG/ML

## 2020-06-10 ENCOUNTER — APPOINTMENT (OUTPATIENT)
Dept: UROLOGY | Facility: CLINIC | Age: 40
End: 2020-06-10
Payer: MEDICARE

## 2020-06-10 VITALS
SYSTOLIC BLOOD PRESSURE: 147 MMHG | HEIGHT: 63 IN | BODY MASS INDEX: 25.16 KG/M2 | HEART RATE: 92 BPM | TEMPERATURE: 98.1 F | WEIGHT: 142 LBS | DIASTOLIC BLOOD PRESSURE: 93 MMHG

## 2020-06-10 PROCEDURE — 51798 US URINE CAPACITY MEASURE: CPT

## 2020-06-10 PROCEDURE — 99213 OFFICE O/P EST LOW 20 MIN: CPT | Mod: 25

## 2020-06-10 NOTE — PHYSICAL EXAM
[General Appearance - Well Developed] : well developed [General Appearance - Well Nourished] : well nourished [Normal Appearance] : normal appearance [Well Groomed] : well groomed [General Appearance - In No Acute Distress] : no acute distress [Abdomen Tenderness] : non-tender [Abdomen Soft] : soft [Costovertebral Angle Tenderness] : no ~M costovertebral angle tenderness [Urinary Bladder Findings] : the bladder was normal on palpation [Edema] : no peripheral edema [] : no respiratory distress [Respiration, Rhythm And Depth] : normal respiratory rhythm and effort [Exaggerated Use Of Accessory Muscles For Inspiration] : no accessory muscle use [Affect] : the affect was normal [Oriented To Time, Place, And Person] : oriented to person, place, and time [Mood] : the mood was normal [Not Anxious] : not anxious [Normal Station and Gait] : the gait and station were normal for the patient's age [No Focal Deficits] : no focal deficits

## 2020-06-23 ENCOUNTER — APPOINTMENT (OUTPATIENT)
Dept: NEPHROLOGY | Facility: CLINIC | Age: 40
End: 2020-06-23
Payer: MEDICARE

## 2020-06-23 PROCEDURE — 99441: CPT | Mod: 95

## 2020-06-29 ENCOUNTER — APPOINTMENT (OUTPATIENT)
Dept: TRANSPLANT | Facility: CLINIC | Age: 40
End: 2020-06-29

## 2020-07-02 LAB
25(OH)D3 SERPL-MCNC: 21.5 NG/ML
ALBUMIN SERPL ELPH-MCNC: 4.6 G/DL
ALP BLD-CCNC: 135 U/L
ALT SERPL-CCNC: 15 U/L
ANION GAP SERPL CALC-SCNC: 11 MMOL/L
APPEARANCE: CLEAR
AST SERPL-CCNC: 20 U/L
BACTERIA: NEGATIVE
BASOPHILS # BLD AUTO: 0.07 K/UL
BASOPHILS NFR BLD AUTO: 0.9 %
BILIRUB SERPL-MCNC: 0.4 MG/DL
BILIRUBIN URINE: NEGATIVE
BLOOD URINE: NEGATIVE
BUN SERPL-MCNC: 18 MG/DL
CALCIUM SERPL-MCNC: 11 MG/DL
CALCIUM SERPL-MCNC: 11 MG/DL
CHLORIDE SERPL-SCNC: 108 MMOL/L
CHOLEST SERPL-MCNC: 207 MG/DL
CHOLEST/HDLC SERPL: 2.9 RATIO
CMV DNA SPEC QL NAA+PROBE: NOT DETECTED
CMVPCR LOG: NOT DETECTED LOG10IU/ML
CO2 SERPL-SCNC: 24 MMOL/L
COLOR: NORMAL
CREAT SERPL-MCNC: 1.09 MG/DL
CREAT SPEC-SCNC: 43 MG/DL
CREAT/PROT UR: 0.7 RATIO
EOSINOPHIL # BLD AUTO: 0.13 K/UL
EOSINOPHIL NFR BLD AUTO: 1.7 %
GLUCOSE QUALITATIVE U: NEGATIVE
GLUCOSE SERPL-MCNC: 138 MG/DL
HCT VFR BLD CALC: 52.5 %
HDLC SERPL-MCNC: 70 MG/DL
HGB BLD-MCNC: 16.8 G/DL
HYALINE CASTS: 0 /LPF
IMM GRANULOCYTES NFR BLD AUTO: 0.6 %
KETONES URINE: NEGATIVE
LDH SERPL-CCNC: 287 U/L
LDLC SERPL CALC-MCNC: 116 MG/DL
LEUKOCYTE ESTERASE URINE: NEGATIVE
LYMPHOCYTES # BLD AUTO: 1.19 K/UL
LYMPHOCYTES NFR BLD AUTO: 15.4 %
MAGNESIUM SERPL-MCNC: 1.9 MG/DL
MAN DIFF?: NORMAL
MCHC RBC-ENTMCNC: 29.5 PG
MCHC RBC-ENTMCNC: 32 GM/DL
MCV RBC AUTO: 92.1 FL
MICROSCOPIC-UA: NORMAL
MONOCYTES # BLD AUTO: 0.85 K/UL
MONOCYTES NFR BLD AUTO: 11 %
NEUTROPHILS # BLD AUTO: 5.46 K/UL
NEUTROPHILS NFR BLD AUTO: 70.4 %
NITRITE URINE: NEGATIVE
PARATHYROID HORMONE INTACT: 90 PG/ML
PH URINE: 6
PHOSPHATE SERPL-MCNC: 3.4 MG/DL
PLATELET # BLD AUTO: 211 K/UL
POTASSIUM SERPL-SCNC: 5.9 MMOL/L
PROT SERPL-MCNC: 7.1 G/DL
PROT UR-MCNC: 30 MG/DL
PROTEIN URINE: ABNORMAL
RBC # BLD: 5.7 M/UL
RBC # FLD: 15.7 %
RED BLOOD CELLS URINE: 1 /HPF
SODIUM SERPL-SCNC: 143 MMOL/L
SPECIFIC GRAVITY URINE: 1.01
SQUAMOUS EPITHELIAL CELLS: 0 /HPF
TACROLIMUS SERPL-MCNC: 5.5 NG/ML
TRIGL SERPL-MCNC: 100 MG/DL
URATE SERPL-MCNC: 5.4 MG/DL
UROBILINOGEN URINE: NORMAL
WBC # FLD AUTO: 7.75 K/UL
WHITE BLOOD CELLS URINE: 1 /HPF

## 2020-07-03 LAB — BKV DNA SPEC QL NAA+PROBE: ABNORMAL COPIES/ML

## 2020-07-23 RX ORDER — TACROLIMUS 4 MG/1
4 TABLET, EXTENDED RELEASE ORAL DAILY
Qty: 180 | Refills: 3 | Status: DISCONTINUED | COMMUNITY
Start: 2019-10-24 | End: 2020-07-23

## 2020-07-27 ENCOUNTER — APPOINTMENT (OUTPATIENT)
Dept: NEPHROLOGY | Facility: CLINIC | Age: 40
End: 2020-07-27
Payer: MEDICARE

## 2020-07-27 VITALS
DIASTOLIC BLOOD PRESSURE: 88 MMHG | HEART RATE: 87 BPM | BODY MASS INDEX: 26.4 KG/M2 | RESPIRATION RATE: 15 BRPM | SYSTOLIC BLOOD PRESSURE: 135 MMHG | TEMPERATURE: 98 F | WEIGHT: 149 LBS | HEIGHT: 63 IN | OXYGEN SATURATION: 97 %

## 2020-07-27 LAB
ALBUMIN SERPL ELPH-MCNC: 4.5 G/DL
ALP BLD-CCNC: 172 U/L
ALT SERPL-CCNC: 15 U/L
ANION GAP SERPL CALC-SCNC: 10 MMOL/L
APPEARANCE: CLEAR
AST SERPL-CCNC: 21 U/L
BACTERIA: NEGATIVE
BASOPHILS # BLD AUTO: 0.05 K/UL
BASOPHILS NFR BLD AUTO: 0.6 %
BILIRUB SERPL-MCNC: 0.4 MG/DL
BILIRUBIN URINE: NEGATIVE
BLOOD URINE: NORMAL
BUN SERPL-MCNC: 22 MG/DL
CALCIUM SERPL-MCNC: 10.7 MG/DL
CHLORIDE SERPL-SCNC: 107 MMOL/L
CO2 SERPL-SCNC: 28 MMOL/L
COLOR: YELLOW
CREAT SERPL-MCNC: 1.39 MG/DL
CREAT SPEC-SCNC: 253 MG/DL
CREAT/PROT UR: 0.3 RATIO
EOSINOPHIL # BLD AUTO: 0.19 K/UL
EOSINOPHIL NFR BLD AUTO: 2.2 %
GLUCOSE QUALITATIVE U: ABNORMAL
GLUCOSE SERPL-MCNC: 105 MG/DL
HCT VFR BLD CALC: 51.9 %
HGB BLD-MCNC: 16.6 G/DL
HYALINE CASTS: 0 /LPF
IMM GRANULOCYTES NFR BLD AUTO: 0.8 %
KETONES URINE: NORMAL
LDH SERPL-CCNC: 273 U/L
LEUKOCYTE ESTERASE URINE: NEGATIVE
LYMPHOCYTES # BLD AUTO: 0.93 K/UL
LYMPHOCYTES NFR BLD AUTO: 10.6 %
MAGNESIUM SERPL-MCNC: 1.8 MG/DL
MAN DIFF?: NORMAL
MCHC RBC-ENTMCNC: 29.8 PG
MCHC RBC-ENTMCNC: 32 GM/DL
MCV RBC AUTO: 93.2 FL
MICROSCOPIC-UA: NORMAL
MONOCYTES # BLD AUTO: 0.84 K/UL
MONOCYTES NFR BLD AUTO: 9.5 %
NEUTROPHILS # BLD AUTO: 6.73 K/UL
NEUTROPHILS NFR BLD AUTO: 76.3 %
NITRITE URINE: NEGATIVE
PH URINE: 6
PHOSPHATE SERPL-MCNC: 2.6 MG/DL
PLATELET # BLD AUTO: 272 K/UL
POTASSIUM SERPL-SCNC: 5.1 MMOL/L
PROT SERPL-MCNC: 7.1 G/DL
PROT UR-MCNC: 74 MG/DL
PROTEIN URINE: ABNORMAL
RBC # BLD: 5.57 M/UL
RBC # FLD: 14.6 %
RED BLOOD CELLS URINE: 4 /HPF
SODIUM SERPL-SCNC: 145 MMOL/L
SPECIFIC GRAVITY URINE: 1.03
SQUAMOUS EPITHELIAL CELLS: 2 /HPF
TACROLIMUS SERPL-MCNC: 6.6 NG/ML
URATE SERPL-MCNC: 5.7 MG/DL
UROBILINOGEN URINE: ABNORMAL
WBC # FLD AUTO: 8.81 K/UL
WHITE BLOOD CELLS URINE: 2 /HPF

## 2020-07-27 PROCEDURE — 99214 OFFICE O/P EST MOD 30 MIN: CPT

## 2020-07-27 NOTE — HISTORY OF PRESENT ILLNESS
[ Donor] :  donor [Basiliximab] : basiliximab [Positive/Positive] : Donor Positive/Recipient Positive [Hepatitis C] : hepatitis c [PHS Increased Risk] : [facility] increased risk [Brain Death] : brain death [KDPI: ____] : Kidney Donor Profile Index: [unfilled] [Received on Pump] : organ received on pump [Cold Ischemic Time: ____] : Cold Ischemic Time: [unfilled] [ABO Incompatible] : ABO compatible [TextBox_34] : DGF requiring HD for hyperkalemia [de-identified] : 39 male, hx DM1, HTN on dialysis since 2013.  did well post transplant but had DGF.  \par \par 2/2020 pt developed urinary retention and required hightower catheter placement.  Saw urology and had catheter removed March 13th.  \par \par Interval events: \par Pt feels well.  Blood pressure occasionally high in the evening.  Also never started losartan and taking veltassa only every other day.

## 2020-07-27 NOTE — PHYSICAL EXAM
[General Appearance - Alert] : alert [General Appearance - In No Acute Distress] : in no acute distress [General Appearance - Well Nourished] : well nourished [Sclera] : the sclera and conjunctiva were normal [Extraocular Movements] : extraocular movements were intact [Outer Ear] : the ears and nose were normal in appearance [Hearing Threshold Finger Rub Not [place]] : hearing was normal [Examination Of The Oral Cavity] : the lips and gums were normal [Neck Appearance] : the appearance of the neck was normal [Thyroid Diffuse Enlargement] : the thyroid was not enlarged [Jugular Venous Distention Increased] : there was no jugular-venous distention [Neck Cervical Mass (___cm)] : no neck mass was observed [Respiration, Rhythm And Depth] : normal respiratory rhythm and effort [Chest Palpation] : palpation of the chest revealed no abnormalities [Auscultation Breath Sounds / Voice Sounds] : lungs were clear to auscultation bilaterally [Heart Rate And Rhythm] : heart rate was normal and rhythm regular [Heart Sounds] : normal S1 and S2 [Heart Sounds Gallop] : no gallops [Bowel Sounds] : normal bowel sounds [Abdomen Soft] : soft [Edema] : there was no peripheral edema [Abdomen Tenderness] : non-tender [Cervical Lymph Nodes Enlarged Posterior Bilaterally] : posterior cervical [Cervical Lymph Nodes Enlarged Anterior Bilaterally] : anterior cervical [Supraclavicular Lymph Nodes Enlarged Bilaterally] : supraclavicular [No CVA Tenderness] : no ~M costovertebral angle tenderness [Abnormal Walk] : normal gait [Nail Clubbing] : no clubbing  or cyanosis of the fingernails [Musculoskeletal - Swelling] : no joint swelling seen [Skin Color & Pigmentation] : normal skin color and pigmentation [Skin Turgor] : normal skin turgor [] : no rash [Cranial Nerves] : cranial nerves 2-12 were intact [Oriented To Time, Place, And Person] : oriented to person, place, and time [No Focal Deficits] : no focal deficits [Impaired Insight] : insight and judgment were intact [Affect] : the affect was normal [Mood] : the mood was normal

## 2020-07-27 NOTE — ASSESSMENT
[FreeTextEntry1] : 1.  Renal transplant - Pt with DGF which has resolved.  Creatinine nadired at 1.  check renal function today. \par 2.  Immunosuppression - simulect induction, cont tac for target 4-6 and pred 5.  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   \par 3.  HTN - elevated at night.  Will start cozaar 25mgs and have pt take veltassa daily.  \par 4.  DM1 - Blood glucose increasing, discussed improving diet. \par 5.  Hyperkalmemia - increase veltassa to 8.6gms daily.  \par 6.  HCV - completed therapy, last pcr ND\par 7.  BK - off of MMF.  Vl decreasing.  \par 8.  Urinary retention - on flomax and saw urology.  Now following with urology. \par 9.  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 discussed low K diet and need to take veltassa daily. \par \par Pt will return for f/u in 1 month.  Labs in 2 weeks to check potassium.

## 2020-07-29 LAB
BKV DNA SPEC QL NAA+PROBE: ABNORMAL COPIES/ML
CMV DNA SPEC QL NAA+PROBE: ABNORMAL IU/ML
HCV RNA SERPL NAA DL=5-ACNC: NOT DETECTED
HCV RNA SERPL NAA+PROBE-LOG IU: NOT DETECTED LOG10IU/ML

## 2020-08-10 ENCOUNTER — APPOINTMENT (OUTPATIENT)
Dept: TRANSPLANT | Facility: CLINIC | Age: 40
End: 2020-08-10

## 2020-08-11 LAB
ALBUMIN SERPL ELPH-MCNC: 4.2 G/DL
ALP BLD-CCNC: 157 U/L
ALT SERPL-CCNC: 15 U/L
ANION GAP SERPL CALC-SCNC: 10 MMOL/L
APPEARANCE: CLEAR
AST SERPL-CCNC: 21 U/L
BACTERIA: NEGATIVE
BASOPHILS # BLD AUTO: 0.04 K/UL
BASOPHILS NFR BLD AUTO: 0.5 %
BILIRUB SERPL-MCNC: 0.5 MG/DL
BILIRUBIN URINE: NEGATIVE
BLOOD URINE: NEGATIVE
BUN SERPL-MCNC: 21 MG/DL
CALCIUM SERPL-MCNC: 9.9 MG/DL
CHLORIDE SERPL-SCNC: 110 MMOL/L
CO2 SERPL-SCNC: 24 MMOL/L
COLOR: NORMAL
CREAT SERPL-MCNC: 1.22 MG/DL
CREAT SPEC-SCNC: 142 MG/DL
CREAT/PROT UR: 0.3 RATIO
EOSINOPHIL # BLD AUTO: 0.12 K/UL
EOSINOPHIL NFR BLD AUTO: 1.5 %
GLUCOSE QUALITATIVE U: ABNORMAL
GLUCOSE SERPL-MCNC: 192 MG/DL
HCT VFR BLD CALC: 48.8 %
HGB BLD-MCNC: 15.7 G/DL
HYALINE CASTS: 2 /LPF
IMM GRANULOCYTES NFR BLD AUTO: 0.8 %
KETONES URINE: NEGATIVE
LDH SERPL-CCNC: 265 U/L
LEUKOCYTE ESTERASE URINE: NEGATIVE
LYMPHOCYTES # BLD AUTO: 1.29 K/UL
LYMPHOCYTES NFR BLD AUTO: 16.6 %
MAGNESIUM SERPL-MCNC: 1.6 MG/DL
MAN DIFF?: NORMAL
MCHC RBC-ENTMCNC: 30 PG
MCHC RBC-ENTMCNC: 32.2 GM/DL
MCV RBC AUTO: 93.1 FL
MICROSCOPIC-UA: NORMAL
MONOCYTES # BLD AUTO: 0.83 K/UL
MONOCYTES NFR BLD AUTO: 10.7 %
NEUTROPHILS # BLD AUTO: 5.44 K/UL
NEUTROPHILS NFR BLD AUTO: 69.9 %
NITRITE URINE: NEGATIVE
PH URINE: 6
PHOSPHATE SERPL-MCNC: 2.2 MG/DL
PLATELET # BLD AUTO: 229 K/UL
POTASSIUM SERPL-SCNC: 4.9 MMOL/L
PROT SERPL-MCNC: 6.7 G/DL
PROT UR-MCNC: 46 MG/DL
PROTEIN URINE: ABNORMAL
RBC # BLD: 5.24 M/UL
RBC # FLD: 14.1 %
RED BLOOD CELLS URINE: 5 /HPF
SODIUM SERPL-SCNC: 144 MMOL/L
SPECIFIC GRAVITY URINE: 1.02
SQUAMOUS EPITHELIAL CELLS: 0 /HPF
TACROLIMUS SERPL-MCNC: 5.2 NG/ML
URATE SERPL-MCNC: 5.1 MG/DL
UROBILINOGEN URINE: NORMAL
WBC # FLD AUTO: 7.78 K/UL
WHITE BLOOD CELLS URINE: 2 /HPF

## 2020-08-14 LAB
BKV DNA SPEC QL NAA+PROBE: ABNORMAL COPIES/ML
CMV DNA SPEC QL NAA+PROBE: 173 IU/ML

## 2020-08-31 ENCOUNTER — APPOINTMENT (OUTPATIENT)
Dept: NEPHROLOGY | Facility: CLINIC | Age: 40
End: 2020-08-31
Payer: MEDICARE

## 2020-08-31 VITALS — SYSTOLIC BLOOD PRESSURE: 140 MMHG | DIASTOLIC BLOOD PRESSURE: 85 MMHG

## 2020-08-31 VITALS
SYSTOLIC BLOOD PRESSURE: 167 MMHG | BODY MASS INDEX: 26.75 KG/M2 | HEART RATE: 98 BPM | TEMPERATURE: 97.9 F | WEIGHT: 151 LBS | OXYGEN SATURATION: 97 % | DIASTOLIC BLOOD PRESSURE: 111 MMHG | HEIGHT: 63 IN | RESPIRATION RATE: 15 BRPM

## 2020-08-31 LAB
ALBUMIN SERPL ELPH-MCNC: 4.7 G/DL
ALP BLD-CCNC: 149 U/L
ALT SERPL-CCNC: 17 U/L
ANION GAP SERPL CALC-SCNC: 14 MMOL/L
APPEARANCE: CLEAR
AST SERPL-CCNC: 22 U/L
BACTERIA: NEGATIVE
BASOPHILS # BLD AUTO: 0.04 K/UL
BASOPHILS NFR BLD AUTO: 0.4 %
BILIRUB SERPL-MCNC: 0.6 MG/DL
BILIRUBIN URINE: NEGATIVE
BLOOD URINE: NEGATIVE
BUN SERPL-MCNC: 16 MG/DL
CALCIUM OXALATE CRYSTALS: ABNORMAL
CALCIUM SERPL-MCNC: 10.8 MG/DL
CHLORIDE SERPL-SCNC: 106 MMOL/L
CO2 SERPL-SCNC: 26 MMOL/L
COLOR: YELLOW
CREAT SERPL-MCNC: 1.12 MG/DL
CREAT SPEC-SCNC: 166 MG/DL
CREAT/PROT UR: 0.3 RATIO
EOSINOPHIL # BLD AUTO: 0.1 K/UL
EOSINOPHIL NFR BLD AUTO: 1.1 %
GLUCOSE QUALITATIVE U: ABNORMAL
GLUCOSE SERPL-MCNC: 59 MG/DL
HCT VFR BLD CALC: 50 %
HGB BLD-MCNC: 16.2 G/DL
HYALINE CASTS: 0 /LPF
IMM GRANULOCYTES NFR BLD AUTO: 0.6 %
KETONES URINE: NEGATIVE
LDH SERPL-CCNC: 274 U/L
LEUKOCYTE ESTERASE URINE: NEGATIVE
LYMPHOCYTES # BLD AUTO: 1.6 K/UL
LYMPHOCYTES NFR BLD AUTO: 17.2 %
MAGNESIUM SERPL-MCNC: 1.8 MG/DL
MAN DIFF?: NORMAL
MCHC RBC-ENTMCNC: 30.3 PG
MCHC RBC-ENTMCNC: 32.4 GM/DL
MCV RBC AUTO: 93.6 FL
MICROSCOPIC-UA: NORMAL
MONOCYTES # BLD AUTO: 0.85 K/UL
MONOCYTES NFR BLD AUTO: 9.1 %
NEUTROPHILS # BLD AUTO: 6.65 K/UL
NEUTROPHILS NFR BLD AUTO: 71.6 %
NITRITE URINE: NEGATIVE
PH URINE: 6
PHOSPHATE SERPL-MCNC: 1.9 MG/DL
PLATELET # BLD AUTO: 256 K/UL
POTASSIUM SERPL-SCNC: 4.6 MMOL/L
PROT SERPL-MCNC: 7.1 G/DL
PROT UR-MCNC: 54 MG/DL
PROTEIN URINE: ABNORMAL
RBC # BLD: 5.34 M/UL
RBC # FLD: 14.1 %
RED BLOOD CELLS URINE: 2 /HPF
SODIUM SERPL-SCNC: 145 MMOL/L
SPECIFIC GRAVITY URINE: 1.02
SQUAMOUS EPITHELIAL CELLS: 0 /HPF
TACROLIMUS SERPL-MCNC: 10 NG/ML
URATE SERPL-MCNC: 5.9 MG/DL
UROBILINOGEN URINE: ABNORMAL
WBC # FLD AUTO: 9.3 K/UL
WHITE BLOOD CELLS URINE: 1 /HPF

## 2020-08-31 PROCEDURE — 99214 OFFICE O/P EST MOD 30 MIN: CPT

## 2020-08-31 RX ORDER — TAMSULOSIN HYDROCHLORIDE 0.4 MG/1
0.4 CAPSULE ORAL
Qty: 30 | Refills: 11 | Status: DISCONTINUED | COMMUNITY
Start: 2020-03-06 | End: 2020-08-31

## 2020-08-31 NOTE — ASSESSMENT
[FreeTextEntry1] : 1.  Renal transplant - Pt with DGF which has resolved.  Creatinine nadired at 1.  check renal function today.  \par 2.  Immunosuppression - simulect induction, cont tac for target 4-6 and pred 5.  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   Pt believe he took tacrolimus before bloodwork today.  \par 3.  HTN - now controlled\par 4.  DM1 - Blood glucose controlled\par 5.  Hyperkalmemia - cont veltassa - pt taking every other day.  K improved by cutting out avacados.    \par 6.  HCV - completed therapy, last pcr ND\par 7.  BK - off of MMF.  Last vl 3,500. \par 8.  Urinary retention - on flomax and saw urology.  Now following with urology. \par 9.  Post transplant erythrocytosis - improved on cozaar. \par \par Labs in 2 weeks, f/u in 1 month.

## 2020-08-31 NOTE — PHYSICAL EXAM
[General Appearance - Alert] : alert [General Appearance - In No Acute Distress] : in no acute distress [General Appearance - Well Nourished] : well nourished [Sclera] : the sclera and conjunctiva were normal [Extraocular Movements] : extraocular movements were intact [Outer Ear] : the ears and nose were normal in appearance [Hearing Threshold Finger Rub Not [place]] : hearing was normal [Examination Of The Oral Cavity] : the lips and gums were normal [Neck Appearance] : the appearance of the neck was normal [Neck Cervical Mass (___cm)] : no neck mass was observed [Jugular Venous Distention Increased] : there was no jugular-venous distention [Thyroid Diffuse Enlargement] : the thyroid was not enlarged [Respiration, Rhythm And Depth] : normal respiratory rhythm and effort [Auscultation Breath Sounds / Voice Sounds] : lungs were clear to auscultation bilaterally [Chest Palpation] : palpation of the chest revealed no abnormalities [Heart Rate And Rhythm] : heart rate was normal and rhythm regular [Heart Sounds] : normal S1 and S2 [Heart Sounds Gallop] : no gallops [Edema] : there was no peripheral edema [Bowel Sounds] : normal bowel sounds [Abdomen Soft] : soft [Abdomen Tenderness] : non-tender [Cervical Lymph Nodes Enlarged Posterior Bilaterally] : posterior cervical [Cervical Lymph Nodes Enlarged Anterior Bilaterally] : anterior cervical [Supraclavicular Lymph Nodes Enlarged Bilaterally] : supraclavicular [No CVA Tenderness] : no ~M costovertebral angle tenderness [Abnormal Walk] : normal gait [Nail Clubbing] : no clubbing  or cyanosis of the fingernails [Musculoskeletal - Swelling] : no joint swelling seen [Skin Color & Pigmentation] : normal skin color and pigmentation [Skin Turgor] : normal skin turgor [] : no rash [Cranial Nerves] : cranial nerves 2-12 were intact [No Focal Deficits] : no focal deficits [Oriented To Time, Place, And Person] : oriented to person, place, and time [Impaired Insight] : insight and judgment were intact [Affect] : the affect was normal [Mood] : the mood was normal

## 2020-08-31 NOTE — HISTORY OF PRESENT ILLNESS
[ Donor] :  donor [Basiliximab] : basiliximab [Positive/Positive] : Donor Positive/Recipient Positive [PHS Increased Risk] : Public Health Service increased risk [Hepatitis C] : hepatitis c [Brain Death] : brain death [Received on Pump] : organ received on pump [KDPI: ____] : Kidney Donor Profile Index: [unfilled] [Cold Ischemic Time: ____] : Cold Ischemic Time: [unfilled] [ABO Incompatible] : ABO compatible [TextBox_34] : DGF requiring HD for hyperkalemia [de-identified] : 39 male, hx DM1, HTN on dialysis since 2013.  did well post transplant but had DGF.  \par \par 2/2020 pt developed urinary retention and required hightower catheter placement.  Saw urology and had catheter removed March 13th.  \par \par Interval events: \par Pt feels well.  Blood pressure has been controlled.  Blood sugar also ok but today feels like he had a low - feels shaky but improved after having a candy.  He believes he passed a stone in his urine a month ago.  Now urinating well.

## 2020-09-03 LAB
BKV DNA SPEC QL NAA+PROBE: ABNORMAL COPIES/ML
CMV DNA SPEC QL NAA+PROBE: ABNORMAL IU/ML

## 2020-09-14 ENCOUNTER — APPOINTMENT (OUTPATIENT)
Dept: TRANSPLANT | Facility: CLINIC | Age: 40
End: 2020-09-14

## 2020-09-14 LAB
ALBUMIN SERPL ELPH-MCNC: 4.5 G/DL
ALP BLD-CCNC: 168 U/L
ALT SERPL-CCNC: 16 U/L
ANION GAP SERPL CALC-SCNC: 10 MMOL/L
APPEARANCE: CLEAR
AST SERPL-CCNC: 22 U/L
BACTERIA: NEGATIVE
BASOPHILS # BLD AUTO: 0.04 K/UL
BASOPHILS NFR BLD AUTO: 0.5 %
BILIRUB SERPL-MCNC: 0.5 MG/DL
BILIRUBIN URINE: NEGATIVE
BLOOD URINE: NEGATIVE
BUN SERPL-MCNC: 16 MG/DL
CALCIUM SERPL-MCNC: 10.4 MG/DL
CHLORIDE SERPL-SCNC: 105 MMOL/L
CO2 SERPL-SCNC: 26 MMOL/L
COLOR: NORMAL
CREAT SERPL-MCNC: 1.2 MG/DL
CREAT SPEC-SCNC: 105 MG/DL
CREAT/PROT UR: 0.4 RATIO
EOSINOPHIL # BLD AUTO: 0.09 K/UL
EOSINOPHIL NFR BLD AUTO: 1.2 %
GLUCOSE QUALITATIVE U: ABNORMAL
GLUCOSE SERPL-MCNC: 140 MG/DL
HCT VFR BLD CALC: 52.2 %
HGB BLD-MCNC: 17.1 G/DL
HYALINE CASTS: 1 /LPF
IMM GRANULOCYTES NFR BLD AUTO: 1.2 %
KETONES URINE: NEGATIVE
LDH SERPL-CCNC: 250 U/L
LEUKOCYTE ESTERASE URINE: NEGATIVE
LYMPHOCYTES # BLD AUTO: 1.44 K/UL
LYMPHOCYTES NFR BLD AUTO: 19.5 %
MAGNESIUM SERPL-MCNC: 1.6 MG/DL
MAN DIFF?: NORMAL
MCHC RBC-ENTMCNC: 30.9 PG
MCHC RBC-ENTMCNC: 32.8 GM/DL
MCV RBC AUTO: 94.4 FL
MICROSCOPIC-UA: NORMAL
MONOCYTES # BLD AUTO: 0.69 K/UL
MONOCYTES NFR BLD AUTO: 9.3 %
NEUTROPHILS # BLD AUTO: 5.04 K/UL
NEUTROPHILS NFR BLD AUTO: 68.3 %
NITRITE URINE: NEGATIVE
PH URINE: 6.5
PHOSPHATE SERPL-MCNC: 2.6 MG/DL
PLATELET # BLD AUTO: 235 K/UL
POTASSIUM SERPL-SCNC: 4.1 MMOL/L
PROT SERPL-MCNC: 6.8 G/DL
PROT UR-MCNC: 40 MG/DL
PROTEIN URINE: ABNORMAL
RBC # BLD: 5.53 M/UL
RBC # FLD: 14.1 %
RED BLOOD CELLS URINE: 3 /HPF
SODIUM SERPL-SCNC: 141 MMOL/L
SPECIFIC GRAVITY URINE: 1.02
SQUAMOUS EPITHELIAL CELLS: 0 /HPF
TACROLIMUS SERPL-MCNC: 7.5 NG/ML
URATE SERPL-MCNC: 5.4 MG/DL
UROBILINOGEN URINE: NORMAL
WBC # FLD AUTO: 7.39 K/UL
WHITE BLOOD CELLS URINE: 1 /HPF

## 2020-09-16 LAB — BKV DNA SPEC QL NAA+PROBE: ABNORMAL COPIES/ML

## 2020-09-17 LAB — CMV DNA SPEC QL NAA+PROBE: ABNORMAL IU/ML

## 2020-09-21 ENCOUNTER — OUTPATIENT (OUTPATIENT)
Dept: OUTPATIENT SERVICES | Facility: HOSPITAL | Age: 40
LOS: 1 days | End: 2020-09-21

## 2020-09-21 DIAGNOSIS — I77.0 ARTERIOVENOUS FISTULA, ACQUIRED: Chronic | ICD-10-CM

## 2020-09-21 DIAGNOSIS — Z98.89 OTHER SPECIFIED POSTPROCEDURAL STATES: Chronic | ICD-10-CM

## 2020-09-21 DIAGNOSIS — Z94.0 KIDNEY TRANSPLANT STATUS: Chronic | ICD-10-CM

## 2020-09-22 ENCOUNTER — APPOINTMENT (OUTPATIENT)
Dept: NEPHROLOGY | Facility: CLINIC | Age: 40
End: 2020-09-22

## 2020-10-01 PROCEDURE — G9005: CPT

## 2020-10-05 ENCOUNTER — APPOINTMENT (OUTPATIENT)
Dept: NEPHROLOGY | Facility: CLINIC | Age: 40
End: 2020-10-05
Payer: MEDICARE

## 2020-10-05 VITALS
HEART RATE: 90 BPM | SYSTOLIC BLOOD PRESSURE: 143 MMHG | HEIGHT: 63 IN | RESPIRATION RATE: 15 BRPM | TEMPERATURE: 97.9 F | BODY MASS INDEX: 26.93 KG/M2 | WEIGHT: 152 LBS | OXYGEN SATURATION: 96 % | DIASTOLIC BLOOD PRESSURE: 94 MMHG

## 2020-10-05 PROCEDURE — 99214 OFFICE O/P EST MOD 30 MIN: CPT

## 2020-10-05 NOTE — HISTORY OF PRESENT ILLNESS
[ Donor] :  donor [Basiliximab] : basiliximab [Positive/Positive] : Donor Positive/Recipient Positive [PHS Increased Risk] : Public Health Service increased risk [Hepatitis C] : hepatitis c [Brain Death] : brain death [Received on Pump] : organ received on pump [KDPI: ____] : Kidney Donor Profile Index: [unfilled] [Cold Ischemic Time: ____] : Cold Ischemic Time: [unfilled] [ABO Incompatible] : ABO compatible [TextBox_34] : DGF requiring HD for hyperkalemia [de-identified] : 40 male, hx DM1, HTN on dialysis since 2013.  did well post transplant but had DGF.  \par \par 2/2020 pt developed urinary retention and required hightower catheter placement.  Saw urology and had catheter removed March 13th.  \par \par Interval events: \par Pt feels well.  Blood pressure has been controlled.  Blood sugar has also improved.  He has cut out tortilla's from his diet.

## 2020-10-05 NOTE — PHYSICAL EXAM
[General Appearance - Alert] : alert [General Appearance - In No Acute Distress] : in no acute distress [General Appearance - Well Nourished] : well nourished [Sclera] : the sclera and conjunctiva were normal [Extraocular Movements] : extraocular movements were intact [Outer Ear] : the ears and nose were normal in appearance [Hearing Threshold Finger Rub Not [place]] : hearing was normal [Examination Of The Oral Cavity] : the lips and gums were normal [Neck Appearance] : the appearance of the neck was normal [Neck Cervical Mass (___cm)] : no neck mass was observed [Jugular Venous Distention Increased] : there was no jugular-venous distention [Thyroid Diffuse Enlargement] : the thyroid was not enlarged [Respiration, Rhythm And Depth] : normal respiratory rhythm and effort [Auscultation Breath Sounds / Voice Sounds] : lungs were clear to auscultation bilaterally [Chest Palpation] : palpation of the chest revealed no abnormalities [Heart Rate And Rhythm] : heart rate was normal and rhythm regular [Heart Sounds] : normal S1 and S2 [Heart Sounds Gallop] : no gallops [Edema] : there was no peripheral edema [Bowel Sounds] : normal bowel sounds [Abdomen Soft] : soft [Abdomen Tenderness] : non-tender [Cervical Lymph Nodes Enlarged Posterior Bilaterally] : posterior cervical [Cervical Lymph Nodes Enlarged Anterior Bilaterally] : anterior cervical [Supraclavicular Lymph Nodes Enlarged Bilaterally] : supraclavicular [No CVA Tenderness] : no ~M costovertebral angle tenderness [Abnormal Walk] : normal gait [Nail Clubbing] : no clubbing  or cyanosis of the fingernails [Musculoskeletal - Swelling] : no joint swelling seen [Skin Color & Pigmentation] : normal skin color and pigmentation [Skin Turgor] : normal skin turgor [] : no rash [Cranial Nerves] : cranial nerves 2-12 were intact [No Focal Deficits] : no focal deficits [Oriented To Time, Place, And Person] : oriented to person, place, and time [Impaired Insight] : insight and judgment were intact [Affect] : the affect was normal [Mood] : the mood was normal

## 2020-10-05 NOTE — ASSESSMENT
[FreeTextEntry1] : 1.  Renal transplant - Pt with DGF which has resolved.  Creatinine nadired at 1, runs 1 - 1.2.  check renal function today.  \par 2.  Immunosuppression - simulect induction, cont tac for target 4-6 and pred 5.  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   \par 3.  HTN - controlled\par 4.  DM1 - Blood glucose controlled\par 5.  Hyperkalmemia - cont veltassa - pt taking every other day.     \par 6.  HCV - completed therapy, last pcr ND\par 7.  BK - off of MMF.  Last vl 3,500. \par 8.  Urinary retention - on flomax and saw urology.  Now following with urology. \par 9.  Post transplant erythrocytosis - increased cozaar last visit.  Will reassess today. \par \par Labs in 2 months. \par Pt will schedule f/u with Dr. Randolph

## 2020-10-05 NOTE — CONSULT LETTER
[Dear  ___] : Dear  [unfilled], [Courtesy Letter:] : I had the pleasure of seeing your patient, [unfilled], in my office today. [Please see my note below.] : Please see my note below. [Consult Closing:] : Thank you very much for allowing me to participate in the care of this patient.  If you have any questions, please do not hesitate to contact me. [Sincerely,] : Sincerely, [FreeTextEntry3] : Jack Del Rio, DO

## 2020-10-06 LAB
25(OH)D3 SERPL-MCNC: 20.9 NG/ML
ALBUMIN SERPL ELPH-MCNC: 4.5 G/DL
ALP BLD-CCNC: 145 U/L
ALT SERPL-CCNC: 16 U/L
ANION GAP SERPL CALC-SCNC: 13 MMOL/L
APPEARANCE: CLEAR
AST SERPL-CCNC: 21 U/L
BACTERIA: NEGATIVE
BASOPHILS # BLD AUTO: 0.05 K/UL
BASOPHILS NFR BLD AUTO: 0.6 %
BILIRUB SERPL-MCNC: 0.4 MG/DL
BILIRUBIN URINE: NEGATIVE
BLOOD URINE: NEGATIVE
BUN SERPL-MCNC: 22 MG/DL
CALCIUM SERPL-MCNC: 10.4 MG/DL
CALCIUM SERPL-MCNC: 10.4 MG/DL
CHLORIDE SERPL-SCNC: 106 MMOL/L
CHOLEST SERPL-MCNC: 215 MG/DL
CHOLEST/HDLC SERPL: 3.5 RATIO
CO2 SERPL-SCNC: 25 MMOL/L
COLOR: YELLOW
CREAT SERPL-MCNC: 1.19 MG/DL
CREAT SPEC-SCNC: 143 MG/DL
CREAT/PROT UR: 0.4 RATIO
EOSINOPHIL # BLD AUTO: 0.1 K/UL
EOSINOPHIL NFR BLD AUTO: 1.1 %
ESTIMATED AVERAGE GLUCOSE: 169 MG/DL
GLUCOSE QUALITATIVE U: NEGATIVE
GLUCOSE SERPL-MCNC: 116 MG/DL
HBA1C MFR BLD HPLC: 7.5 %
HCT VFR BLD CALC: 53.2 %
HDLC SERPL-MCNC: 61 MG/DL
HGB BLD-MCNC: 16.8 G/DL
HYALINE CASTS: 0 /LPF
IMM GRANULOCYTES NFR BLD AUTO: 0.5 %
KETONES URINE: NEGATIVE
LDH SERPL-CCNC: 240 U/L
LDLC SERPL CALC-MCNC: 133 MG/DL
LEUKOCYTE ESTERASE URINE: NEGATIVE
LYMPHOCYTES # BLD AUTO: 1.41 K/UL
LYMPHOCYTES NFR BLD AUTO: 16 %
MAGNESIUM SERPL-MCNC: 1.7 MG/DL
MAN DIFF?: NORMAL
MCHC RBC-ENTMCNC: 29.8 PG
MCHC RBC-ENTMCNC: 31.6 GM/DL
MCV RBC AUTO: 94.5 FL
MICROSCOPIC-UA: NORMAL
MONOCYTES # BLD AUTO: 0.86 K/UL
MONOCYTES NFR BLD AUTO: 9.8 %
NEUTROPHILS # BLD AUTO: 6.34 K/UL
NEUTROPHILS NFR BLD AUTO: 72 %
NITRITE URINE: NEGATIVE
PARATHYROID HORMONE INTACT: 129 PG/ML
PH URINE: 6.5
PHOSPHATE SERPL-MCNC: 2.1 MG/DL
PLATELET # BLD AUTO: 261 K/UL
POTASSIUM SERPL-SCNC: 4.9 MMOL/L
PROT SERPL-MCNC: 6.8 G/DL
PROT UR-MCNC: 51 MG/DL
PROTEIN URINE: ABNORMAL
RBC # BLD: 5.63 M/UL
RBC # FLD: 13.8 %
RED BLOOD CELLS URINE: 2 /HPF
SODIUM SERPL-SCNC: 144 MMOL/L
SPECIFIC GRAVITY URINE: 1.02
SQUAMOUS EPITHELIAL CELLS: 0 /HPF
TACROLIMUS SERPL-MCNC: 5.1 NG/ML
TRIGL SERPL-MCNC: 103 MG/DL
URATE SERPL-MCNC: 5.1 MG/DL
UROBILINOGEN URINE: NORMAL
WBC # FLD AUTO: 8.8 K/UL
WHITE BLOOD CELLS URINE: 1 /HPF

## 2020-10-08 LAB
BKV DNA SPEC QL NAA+PROBE: ABNORMAL COPIES/ML
CMV DNA SPEC QL NAA+PROBE: ABNORMAL IU/ML

## 2020-11-09 ENCOUNTER — APPOINTMENT (OUTPATIENT)
Dept: TRANSPLANT | Facility: CLINIC | Age: 40
End: 2020-11-09

## 2020-11-10 LAB
ALBUMIN SERPL ELPH-MCNC: 4.4 G/DL
ALP BLD-CCNC: 156 U/L
ALT SERPL-CCNC: 12 U/L
ANION GAP SERPL CALC-SCNC: 14 MMOL/L
APPEARANCE: CLEAR
AST SERPL-CCNC: 19 U/L
BACTERIA: NEGATIVE
BASOPHILS # BLD AUTO: 0.05 K/UL
BASOPHILS NFR BLD AUTO: 0.7 %
BILIRUB SERPL-MCNC: 0.4 MG/DL
BILIRUBIN URINE: NEGATIVE
BLOOD URINE: NEGATIVE
BUN SERPL-MCNC: 24 MG/DL
CALCIUM SERPL-MCNC: 10.4 MG/DL
CALCIUM SERPL-MCNC: 10.4 MG/DL
CHLORIDE SERPL-SCNC: 108 MMOL/L
CO2 SERPL-SCNC: 19 MMOL/L
COLOR: YELLOW
CREAT SERPL-MCNC: 1.19 MG/DL
CREAT SPEC-SCNC: 140 MG/DL
CREAT/PROT UR: 0.4 RATIO
EOSINOPHIL # BLD AUTO: 0.2 K/UL
EOSINOPHIL NFR BLD AUTO: 2.6 %
GLUCOSE QUALITATIVE U: NEGATIVE
GLUCOSE SERPL-MCNC: 137 MG/DL
HCT VFR BLD CALC: 52.5 %
HGB BLD-MCNC: 17 G/DL
HYALINE CASTS: 0 /LPF
IMM GRANULOCYTES NFR BLD AUTO: 1.1 %
KETONES URINE: NEGATIVE
LDH SERPL-CCNC: 280 U/L
LEUKOCYTE ESTERASE URINE: NEGATIVE
LYMPHOCYTES # BLD AUTO: 1.29 K/UL
LYMPHOCYTES NFR BLD AUTO: 17.1 %
MAGNESIUM SERPL-MCNC: 1.9 MG/DL
MAN DIFF?: NORMAL
MCHC RBC-ENTMCNC: 30 PG
MCHC RBC-ENTMCNC: 32.4 GM/DL
MCV RBC AUTO: 92.6 FL
MICROSCOPIC-UA: NORMAL
MONOCYTES # BLD AUTO: 0.74 K/UL
MONOCYTES NFR BLD AUTO: 9.8 %
NEUTROPHILS # BLD AUTO: 5.19 K/UL
NEUTROPHILS NFR BLD AUTO: 68.7 %
NITRITE URINE: NEGATIVE
PARATHYROID HORMONE INTACT: 152 PG/ML
PH URINE: 6
PHOSPHATE SERPL-MCNC: 2.2 MG/DL
PLATELET # BLD AUTO: 215 K/UL
POTASSIUM SERPL-SCNC: 5.2 MMOL/L
PROT SERPL-MCNC: 6.8 G/DL
PROT UR-MCNC: 53 MG/DL
PROTEIN URINE: ABNORMAL
RBC # BLD: 5.67 M/UL
RBC # FLD: 13.6 %
RED BLOOD CELLS URINE: 3 /HPF
SARS-COV-2 IGG SERPL IA-ACNC: <0.1 INDEX
SARS-COV-2 IGG SERPL QL IA: NEGATIVE
SODIUM SERPL-SCNC: 141 MMOL/L
SPECIFIC GRAVITY URINE: 1.02
SQUAMOUS EPITHELIAL CELLS: 0 /HPF
TACROLIMUS SERPL-MCNC: 4.6 NG/ML
URATE SERPL-MCNC: 5 MG/DL
UROBILINOGEN URINE: NORMAL
WBC # FLD AUTO: 7.55 K/UL
WHITE BLOOD CELLS URINE: 1 /HPF

## 2020-11-12 LAB
BKV DNA SPEC QL NAA+PROBE: ABNORMAL COPIES/ML
CMV DNA SPEC QL NAA+PROBE: NOT DETECTED IU/ML

## 2020-12-07 ENCOUNTER — APPOINTMENT (OUTPATIENT)
Dept: NEPHROLOGY | Facility: CLINIC | Age: 40
End: 2020-12-07
Payer: MEDICARE

## 2020-12-07 VITALS
RESPIRATION RATE: 15 BRPM | DIASTOLIC BLOOD PRESSURE: 89 MMHG | HEIGHT: 63 IN | SYSTOLIC BLOOD PRESSURE: 157 MMHG | OXYGEN SATURATION: 100 % | TEMPERATURE: 98 F | HEART RATE: 80 BPM | BODY MASS INDEX: 26.58 KG/M2 | WEIGHT: 150 LBS

## 2020-12-07 DIAGNOSIS — Z23 ENCOUNTER FOR IMMUNIZATION: ICD-10-CM

## 2020-12-07 PROCEDURE — 99214 OFFICE O/P EST MOD 30 MIN: CPT

## 2020-12-07 NOTE — HISTORY OF PRESENT ILLNESS
[ Donor] :  donor [Basiliximab] : basiliximab [Positive/Positive] : Donor Positive/Recipient Positive [PHS Increased Risk] : [facility] Health Service increased risk [Hepatitis C] : hepatitis c [Brain Death] : brain death [Received on Pump] : organ received on pump [KDPI: ____] : Kidney Donor Profile Index: [unfilled] [Cold Ischemic Time: ____] : Cold Ischemic Time: [unfilled] [ABO Incompatible] : ABO compatible [TextBox_34] : DGF requiring HD for hyperkalemia [de-identified] : 40 male, hx DM1, HTN on dialysis since 2013.  did well post transplant but had DGF.  \par \par 2/2020 pt developed urinary retention and required hightower catheter placement.  Saw urology and had catheter removed March 13th.  \par \par Interval events: \par Pt feels well.  Blood sugar has also improved. Blood pressure has been occasionally high.  No recent illness.

## 2020-12-07 NOTE — PHYSICAL EXAM
[General Appearance - Alert] : alert [General Appearance - In No Acute Distress] : in no acute distress [General Appearance - Well Nourished] : well nourished [Sclera] : the sclera and conjunctiva were normal [Extraocular Movements] : extraocular movements were intact [Outer Ear] : the ears and nose were normal in appearance [Hearing Threshold Finger Rub Not [place]] : hearing was normal [Examination Of The Oral Cavity] : the lips and gums were normal [Neck Appearance] : the appearance of the neck was normal [Neck Cervical Mass (___cm)] : no neck mass was observed [Jugular Venous Distention Increased] : there was no jugular-venous distention [Thyroid Diffuse Enlargement] : the thyroid was not enlarged [Respiration, Rhythm And Depth] : normal respiratory rhythm and effort [Auscultation Breath Sounds / Voice Sounds] : lungs were clear to auscultation bilaterally [Chest Palpation] : palpation of the chest revealed no abnormalities [Heart Rate And Rhythm] : heart rate was normal and rhythm regular [Heart Sounds] : normal S1 and S2 [Heart Sounds Gallop] : no gallops [Edema] : there was no peripheral edema [Bowel Sounds] : normal bowel sounds [Abdomen Soft] : soft [Abdomen Tenderness] : non-tender [Cervical Lymph Nodes Enlarged Posterior Bilaterally] : posterior cervical [Cervical Lymph Nodes Enlarged Anterior Bilaterally] : anterior cervical [Supraclavicular Lymph Nodes Enlarged Bilaterally] : supraclavicular [No CVA Tenderness] : no ~M costovertebral angle tenderness [Abnormal Walk] : normal gait [Nail Clubbing] : no clubbing  or cyanosis of the fingernails [Musculoskeletal - Swelling] : no joint swelling seen [Skin Color & Pigmentation] : normal skin color and pigmentation [Skin Turgor] : normal skin turgor [] : no rash [Cranial Nerves] : cranial nerves 2-12 were intact [No Focal Deficits] : no focal deficits [Oriented To Time, Place, And Person] : oriented to person, place, and time [Impaired Insight] : insight and judgment were intact [Affect] : the affect was normal [Mood] : the mood was normal

## 2020-12-07 NOTE — ASSESSMENT
[FreeTextEntry1] : 1.  Renal transplant - Pt with DGF which has resolved.  Creatinine nadired at 1, runs 1 - 1.2.  check renal function today.  \par 2.  Immunosuppression - simulect induction, cont tac for target 4-6 and pred 5.  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   \par 3.  HTN - slightly elevated.  Increase losartan. \par 4.  DM1 - Blood glucose controlled on log\par 5.  Hyperkalmemia - cont veltassa - pt taking every other day.     \par 6.  HCV - completed therapy, last pcr ND\par 7.  BK - off of MMF.  Last vl 3,500.  Repeat today\par 8.  Urinary retention - on flomax and saw urology.  Now following with urology. \par 9.  Post transplant erythrocytosis - increase cozaar to 50mgs BID.  Cont veltassa for potassium control. \par \par Labs in 2 weeds and f/u in 2 months. \par Influenza vaccine today\par Pt will schedule f/u with Dr. Randolph

## 2020-12-08 ENCOUNTER — NON-APPOINTMENT (OUTPATIENT)
Age: 40
End: 2020-12-08

## 2020-12-08 LAB
ALBUMIN SERPL ELPH-MCNC: 4.8 G/DL
ALP BLD-CCNC: 165 U/L
ALT SERPL-CCNC: 17 U/L
ANION GAP SERPL CALC-SCNC: 12 MMOL/L
APPEARANCE: CLEAR
AST SERPL-CCNC: 23 U/L
BACTERIA: NEGATIVE
BASOPHILS # BLD AUTO: 0.04 K/UL
BASOPHILS NFR BLD AUTO: 0.4 %
BILIRUB SERPL-MCNC: 0.7 MG/DL
BILIRUBIN URINE: NEGATIVE
BLOOD URINE: NEGATIVE
BUN SERPL-MCNC: 24 MG/DL
CALCIUM SERPL-MCNC: 10.3 MG/DL
CALCIUM SERPL-MCNC: 10.3 MG/DL
CHLORIDE SERPL-SCNC: 105 MMOL/L
CO2 SERPL-SCNC: 25 MMOL/L
COLOR: NORMAL
CREAT SERPL-MCNC: 1.18 MG/DL
CREAT SPEC-SCNC: 67 MG/DL
CREAT/PROT UR: 0.3 RATIO
EOSINOPHIL # BLD AUTO: 0.12 K/UL
EOSINOPHIL NFR BLD AUTO: 1.3 %
GLUCOSE QUALITATIVE U: NORMAL
GLUCOSE SERPL-MCNC: 221 MG/DL
HCT VFR BLD CALC: 52.5 %
HGB BLD-MCNC: 17.3 G/DL
HYALINE CASTS: 0 /LPF
IMM GRANULOCYTES NFR BLD AUTO: 0.7 %
KETONES URINE: NEGATIVE
LDH SERPL-CCNC: 266 U/L
LEUKOCYTE ESTERASE URINE: NEGATIVE
LYMPHOCYTES # BLD AUTO: 1.36 K/UL
LYMPHOCYTES NFR BLD AUTO: 14.8 %
MAGNESIUM SERPL-MCNC: 1.7 MG/DL
MAN DIFF?: NORMAL
MCHC RBC-ENTMCNC: 30.7 PG
MCHC RBC-ENTMCNC: 33 GM/DL
MCV RBC AUTO: 93.1 FL
MICROSCOPIC-UA: NORMAL
MONOCYTES # BLD AUTO: 0.58 K/UL
MONOCYTES NFR BLD AUTO: 6.3 %
NEUTROPHILS # BLD AUTO: 7.04 K/UL
NEUTROPHILS NFR BLD AUTO: 76.5 %
NITRITE URINE: NEGATIVE
PARATHYROID HORMONE INTACT: 167 PG/ML
PH URINE: 6.5
PHOSPHATE SERPL-MCNC: 2.6 MG/DL
PLATELET # BLD AUTO: 235 K/UL
POTASSIUM SERPL-SCNC: 5 MMOL/L
PROT SERPL-MCNC: 7.4 G/DL
PROT UR-MCNC: 19 MG/DL
PROTEIN URINE: NORMAL
RBC # BLD: 5.64 M/UL
RBC # FLD: 13.6 %
RED BLOOD CELLS URINE: 2 /HPF
SODIUM SERPL-SCNC: 141 MMOL/L
SPECIFIC GRAVITY URINE: 1.01
SQUAMOUS EPITHELIAL CELLS: 0 /HPF
TACROLIMUS SERPL-MCNC: 5.5 NG/ML
URATE SERPL-MCNC: 5.6 MG/DL
UROBILINOGEN URINE: NORMAL
WBC # FLD AUTO: 9.2 K/UL
WHITE BLOOD CELLS URINE: 1 /HPF

## 2020-12-10 ENCOUNTER — APPOINTMENT (OUTPATIENT)
Dept: UROLOGY | Facility: CLINIC | Age: 40
End: 2020-12-10
Payer: MEDICARE

## 2020-12-10 VITALS
HEIGHT: 63 IN | TEMPERATURE: 97.2 F | SYSTOLIC BLOOD PRESSURE: 134 MMHG | BODY MASS INDEX: 26.58 KG/M2 | WEIGHT: 150 LBS | HEART RATE: 88 BPM | DIASTOLIC BLOOD PRESSURE: 88 MMHG

## 2020-12-10 DIAGNOSIS — R33.9 RETENTION OF URINE, UNSPECIFIED: ICD-10-CM

## 2020-12-10 LAB
BKV DNA SPEC QL NAA+PROBE: ABNORMAL COPIES/ML
CMV DNA SPEC QL NAA+PROBE: NOT DETECTED IU/ML

## 2020-12-10 PROCEDURE — 51798 US URINE CAPACITY MEASURE: CPT

## 2020-12-10 PROCEDURE — 99213 OFFICE O/P EST LOW 20 MIN: CPT | Mod: 25

## 2020-12-10 NOTE — HISTORY OF PRESENT ILLNESS
[FreeTextEntry1] : Pt comes in follow up urinary retention. Pt currently on 1 flomax daily. He feels he is urinating well. He has no complaints at this time. \par \par PVR 3

## 2020-12-21 ENCOUNTER — APPOINTMENT (OUTPATIENT)
Dept: TRANSPLANT | Facility: CLINIC | Age: 40
End: 2020-12-21

## 2020-12-21 LAB
ALBUMIN SERPL ELPH-MCNC: 4.3 G/DL
ALP BLD-CCNC: 147 U/L
ALT SERPL-CCNC: 14 U/L
ANION GAP SERPL CALC-SCNC: 9 MMOL/L
APPEARANCE: CLEAR
AST SERPL-CCNC: 22 U/L
BACTERIA: NEGATIVE
BASOPHILS # BLD AUTO: 0.06 K/UL
BASOPHILS NFR BLD AUTO: 0.9 %
BILIRUB SERPL-MCNC: 0.5 MG/DL
BILIRUBIN URINE: NEGATIVE
BLOOD URINE: NEGATIVE
BUN SERPL-MCNC: 20 MG/DL
CALCIUM SERPL-MCNC: 10.1 MG/DL
CALCIUM SERPL-MCNC: 10.1 MG/DL
CHLORIDE SERPL-SCNC: 103 MMOL/L
CO2 SERPL-SCNC: 26 MMOL/L
COLOR: YELLOW
CREAT SERPL-MCNC: 1.31 MG/DL
CREAT SPEC-SCNC: 165 MG/DL
CREAT/PROT UR: 0.4 RATIO
EOSINOPHIL # BLD AUTO: 0.09 K/UL
EOSINOPHIL NFR BLD AUTO: 1.3 %
GLUCOSE QUALITATIVE U: ABNORMAL
GLUCOSE SERPL-MCNC: 274 MG/DL
HCT VFR BLD CALC: 50.2 %
HGB BLD-MCNC: 16.4 G/DL
HYALINE CASTS: 2 /LPF
IMM GRANULOCYTES NFR BLD AUTO: 0.7 %
KETONES URINE: NORMAL
LDH SERPL-CCNC: 256 U/L
LEUKOCYTE ESTERASE URINE: NEGATIVE
LYMPHOCYTES # BLD AUTO: 1.39 K/UL
LYMPHOCYTES NFR BLD AUTO: 20.7 %
MAGNESIUM SERPL-MCNC: 1.7 MG/DL
MAN DIFF?: NORMAL
MCHC RBC-ENTMCNC: 30 PG
MCHC RBC-ENTMCNC: 32.7 GM/DL
MCV RBC AUTO: 91.9 FL
MICROSCOPIC-UA: NORMAL
MONOCYTES # BLD AUTO: 0.67 K/UL
MONOCYTES NFR BLD AUTO: 10 %
NEUTROPHILS # BLD AUTO: 4.45 K/UL
NEUTROPHILS NFR BLD AUTO: 66.4 %
NITRITE URINE: NEGATIVE
PARATHYROID HORMONE INTACT: 178 PG/ML
PH URINE: 6
PHOSPHATE SERPL-MCNC: 3 MG/DL
PLATELET # BLD AUTO: 220 K/UL
POTASSIUM SERPL-SCNC: 4.5 MMOL/L
PROT SERPL-MCNC: 6.8 G/DL
PROT UR-MCNC: 60 MG/DL
PROTEIN URINE: ABNORMAL
RBC # BLD: 5.46 M/UL
RBC # FLD: 13.7 %
RED BLOOD CELLS URINE: 3 /HPF
SODIUM SERPL-SCNC: 139 MMOL/L
SPECIFIC GRAVITY URINE: 1.02
SQUAMOUS EPITHELIAL CELLS: 1 /HPF
TACROLIMUS SERPL-MCNC: 7.1 NG/ML
URATE SERPL-MCNC: 6 MG/DL
UROBILINOGEN URINE: NORMAL
WBC # FLD AUTO: 6.71 K/UL
WHITE BLOOD CELLS URINE: 1 /HPF

## 2020-12-23 PROBLEM — Z87.440 HISTORY OF URINARY TRACT INFECTION: Status: RESOLVED | Noted: 2020-04-01 | Resolved: 2020-12-23

## 2020-12-24 LAB — BKV DNA SPEC QL NAA+PROBE: ABNORMAL COPIES/ML

## 2021-02-01 ENCOUNTER — APPOINTMENT (OUTPATIENT)
Dept: TRANSPLANT | Facility: CLINIC | Age: 41
End: 2021-02-01

## 2021-02-04 ENCOUNTER — APPOINTMENT (OUTPATIENT)
Dept: TRANSPLANT | Facility: CLINIC | Age: 41
End: 2021-02-04

## 2021-02-05 ENCOUNTER — NON-APPOINTMENT (OUTPATIENT)
Age: 41
End: 2021-02-05

## 2021-02-05 LAB
25(OH)D3 SERPL-MCNC: 18.8 NG/ML
ALBUMIN SERPL ELPH-MCNC: 4.2 G/DL
ALP BLD-CCNC: 129 U/L
ALT SERPL-CCNC: 11 U/L
ANION GAP SERPL CALC-SCNC: 11 MMOL/L
APPEARANCE: CLEAR
AST SERPL-CCNC: 18 U/L
BACTERIA: NEGATIVE
BASOPHILS # BLD AUTO: 0.03 K/UL
BASOPHILS NFR BLD AUTO: 0.3 %
BILIRUB SERPL-MCNC: 0.5 MG/DL
BILIRUBIN URINE: NEGATIVE
BLOOD URINE: NEGATIVE
BUN SERPL-MCNC: 14 MG/DL
CALCIUM SERPL-MCNC: 9.3 MG/DL
CALCIUM SERPL-MCNC: 9.3 MG/DL
CHLORIDE SERPL-SCNC: 104 MMOL/L
CHOLEST SERPL-MCNC: 116 MG/DL
CO2 SERPL-SCNC: 21 MMOL/L
COLOR: NORMAL
CREAT SERPL-MCNC: 1.43 MG/DL
CREAT SPEC-SCNC: 134 MG/DL
CREAT/PROT UR: 0.2 RATIO
EOSINOPHIL # BLD AUTO: 0.03 K/UL
EOSINOPHIL NFR BLD AUTO: 0.3 %
ESTIMATED AVERAGE GLUCOSE: 171 MG/DL
GLUCOSE QUALITATIVE U: NEGATIVE
GLUCOSE SERPL-MCNC: 157 MG/DL
HBA1C MFR BLD HPLC: 7.6 %
HCT VFR BLD CALC: 46 %
HDLC SERPL-MCNC: 49 MG/DL
HGB BLD-MCNC: 14.8 G/DL
HYALINE CASTS: 0 /LPF
IMM GRANULOCYTES NFR BLD AUTO: 0.3 %
KETONES URINE: NEGATIVE
LDH SERPL-CCNC: 209 U/L
LDLC SERPL CALC-MCNC: 51 MG/DL
LEUKOCYTE ESTERASE URINE: NEGATIVE
LYMPHOCYTES # BLD AUTO: 1.05 K/UL
LYMPHOCYTES NFR BLD AUTO: 11 %
MAGNESIUM SERPL-MCNC: 1.4 MG/DL
MAN DIFF?: NORMAL
MCHC RBC-ENTMCNC: 29.6 PG
MCHC RBC-ENTMCNC: 32.2 GM/DL
MCV RBC AUTO: 92 FL
MICROSCOPIC-UA: NORMAL
MONOCYTES # BLD AUTO: 1.2 K/UL
MONOCYTES NFR BLD AUTO: 12.5 %
NEUTROPHILS # BLD AUTO: 7.24 K/UL
NEUTROPHILS NFR BLD AUTO: 75.6 %
NITRITE URINE: NEGATIVE
NONHDLC SERPL-MCNC: 67 MG/DL
PARATHYROID HORMONE INTACT: 190 PG/ML
PH URINE: 6.5
PHOSPHATE SERPL-MCNC: 1.9 MG/DL
PLATELET # BLD AUTO: 201 K/UL
POTASSIUM SERPL-SCNC: 4.3 MMOL/L
PROT SERPL-MCNC: 6.5 G/DL
PROT UR-MCNC: 31 MG/DL
PROTEIN URINE: ABNORMAL
RBC # BLD: 5 M/UL
RBC # FLD: 14.7 %
RED BLOOD CELLS URINE: 1 /HPF
SODIUM SERPL-SCNC: 136 MMOL/L
SPECIFIC GRAVITY URINE: 1.02
SQUAMOUS EPITHELIAL CELLS: 1 /HPF
TACROLIMUS SERPL-MCNC: 5.4 NG/ML
TRIGL SERPL-MCNC: 78 MG/DL
URATE SERPL-MCNC: 4.7 MG/DL
UROBILINOGEN URINE: NORMAL
WBC # FLD AUTO: 9.58 K/UL
WHITE BLOOD CELLS URINE: 1 /HPF

## 2021-02-08 ENCOUNTER — APPOINTMENT (OUTPATIENT)
Dept: NEPHROLOGY | Facility: CLINIC | Age: 41
End: 2021-02-08
Payer: MEDICARE

## 2021-02-08 VITALS
BODY MASS INDEX: 27.46 KG/M2 | HEIGHT: 63 IN | OXYGEN SATURATION: 98 % | RESPIRATION RATE: 15 BRPM | SYSTOLIC BLOOD PRESSURE: 115 MMHG | WEIGHT: 155 LBS | HEART RATE: 85 BPM | DIASTOLIC BLOOD PRESSURE: 79 MMHG | TEMPERATURE: 97.9 F

## 2021-02-08 DIAGNOSIS — Z86.19 PERSONAL HISTORY OF OTHER INFECTIOUS AND PARASITIC DISEASES: ICD-10-CM

## 2021-02-08 PROCEDURE — 99214 OFFICE O/P EST MOD 30 MIN: CPT

## 2021-02-08 NOTE — CONSULT LETTER
[Dear  ___] : Dear  [unfilled], [Courtesy Letter:] : I had the pleasure of seeing your patient, [unfilled], in my office today. [Please see my note below.] : Please see my note below. [Consult Closing:] : Thank you very much for allowing me to participate in the care of this patient.  If you have any questions, please do not hesitate to contact me. [Sincerely,] : Sincerely, [FreeTextEntry3] : Jack Del Rio, DO Diabetes    Hypertension    TIA (transient ischemic attack)

## 2021-02-08 NOTE — PHYSICAL EXAM
[General Appearance - Alert] : alert [General Appearance - In No Acute Distress] : in no acute distress [General Appearance - Well Nourished] : well nourished [Sclera] : the sclera and conjunctiva were normal [Extraocular Movements] : extraocular movements were intact [Outer Ear] : the ears and nose were normal in appearance [Hearing Threshold Finger Rub Not [place]] : hearing was normal [Examination Of The Oral Cavity] : the lips and gums were normal [Neck Appearance] : the appearance of the neck was normal [Neck Cervical Mass (___cm)] : no neck mass was observed [Jugular Venous Distention Increased] : there was no jugular-venous distention [Thyroid Diffuse Enlargement] : the thyroid was not enlarged [Respiration, Rhythm And Depth] : normal respiratory rhythm and effort [Auscultation Breath Sounds / Voice Sounds] : lungs were clear to auscultation bilaterally [Chest Palpation] : palpation of the chest revealed no abnormalities [Heart Rate And Rhythm] : heart rate was normal and rhythm regular [Heart Sounds] : normal S1 and S2 [Heart Sounds Gallop] : no gallops [Edema] : there was no peripheral edema [Bowel Sounds] : normal bowel sounds [Abdomen Soft] : soft [Abdomen Tenderness] : non-tender [Cervical Lymph Nodes Enlarged Posterior Bilaterally] : posterior cervical [Cervical Lymph Nodes Enlarged Anterior Bilaterally] : anterior cervical [Supraclavicular Lymph Nodes Enlarged Bilaterally] : supraclavicular [No CVA Tenderness] : no ~M costovertebral angle tenderness [Nail Clubbing] : no clubbing  or cyanosis of the fingernails [Abnormal Walk] : normal gait [Musculoskeletal - Swelling] : no joint swelling seen [Skin Color & Pigmentation] : normal skin color and pigmentation [Skin Turgor] : normal skin turgor [] : no rash [Cranial Nerves] : cranial nerves 2-12 were intact [No Focal Deficits] : no focal deficits [Oriented To Time, Place, And Person] : oriented to person, place, and time [Impaired Insight] : insight and judgment were intact [Affect] : the affect was normal [Mood] : the mood was normal

## 2021-02-08 NOTE — ASSESSMENT
[FreeTextEntry1] : 1.  Renal transplant - Pt with DGF which has resolved.  Creatinine nadired at 1, runs 1 - 1.2 but now up to 1.4.  Most likely the increase in creatinine is related to increase in losartan.  Will repeat creatinine in 1 month.    \par 2.  Immunosuppression - simulect induction, cont tac for target 4-6 and pred 5.  Tacro 5.4 which is in target.  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   \par 3.  HTN - controlled\par 4.  DM1 - Blood glucose controlled on log\par 5.  Hyperkalmemia - cont veltassa - pt taking every other day.     \par 6.  HCV - completed therapy, last pcr ND\par 7.  BK - off of MMF.  Last vl 1,100.  Repeat today\par 8.  Urinary retention - on flomax and saw urology.  Now following with urology. \par 9.  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  Hypovitaminosis D - start ERgo for 6 weeks. \par \par Labs in 2 months and f/u in 3 months. \par Asked pt again to schedule f/u with Dr. Randolph

## 2021-02-08 NOTE — HISTORY OF PRESENT ILLNESS
[ Donor] :  donor [Basiliximab] : basiliximab [Positive/Positive] : Donor Positive/Recipient Positive [PHS Increased Risk] : Public Health Service increased risk [Hepatitis C] : hepatitis c [Brain Death] : brain death [Received on Pump] : organ received on pump [KDPI: ____] : Kidney Donor Profile Index: [unfilled] [Cold Ischemic Time: ____] : Cold Ischemic Time: [unfilled] [ABO Incompatible] : ABO compatible [TextBox_34] : DGF requiring HD for hyperkalemia [de-identified] : 40 male, hx DM1, HTN on dialysis since 2013.  did well post transplant but had DGF.  \par \par 2/2020 pt developed urinary retention and required hightower catheter placement.  Saw urology and had catheter removed March 13th.  \par \par Interval events: \par Pt feels well.  Last A1c 7.6.  Creatinine 1.4, trending up.  BK 1,100.  Tacro 5.4.  Pt visited [place] a month ago, mother had Covid.  Pt never became ill.

## 2021-02-16 LAB
BKV DNA SPEC QL NAA+PROBE: ABNORMAL COPIES/ML
CMV DNA SPEC QL NAA+PROBE: NOT DETECTED IU/ML

## 2021-02-16 NOTE — PROGRESS NOTE ADULT - PROBLEM SELECTOR PROBLEM 2
[Normal] : normal rate, regular rhythm, normal S1 and S2 and no murmur heard
Immunosuppression

## 2021-03-09 ENCOUNTER — APPOINTMENT (OUTPATIENT)
Dept: TRANSPLANT | Facility: CLINIC | Age: 41
End: 2021-03-09

## 2021-03-11 ENCOUNTER — NON-APPOINTMENT (OUTPATIENT)
Age: 41
End: 2021-03-11

## 2021-03-11 ENCOUNTER — APPOINTMENT (OUTPATIENT)
Dept: TRANSPLANT | Facility: CLINIC | Age: 41
End: 2021-03-11

## 2021-03-11 LAB
ALBUMIN SERPL ELPH-MCNC: 4 G/DL
ALP BLD-CCNC: 162 U/L
ALT SERPL-CCNC: 27 U/L
ANION GAP SERPL CALC-SCNC: 13 MMOL/L
APPEARANCE: CLEAR
AST SERPL-CCNC: 29 U/L
BACTERIA: NEGATIVE
BASOPHILS # BLD AUTO: 0.04 K/UL
BASOPHILS NFR BLD AUTO: 0.5 %
BILIRUB SERPL-MCNC: 0.4 MG/DL
BILIRUBIN URINE: NEGATIVE
BLOOD URINE: NEGATIVE
BUN SERPL-MCNC: 14 MG/DL
CALCIUM SERPL-MCNC: 9.6 MG/DL
CALCIUM SERPL-MCNC: 9.6 MG/DL
CHLORIDE SERPL-SCNC: 105 MMOL/L
CO2 SERPL-SCNC: 21 MMOL/L
COLOR: NORMAL
CREAT SERPL-MCNC: 0.92 MG/DL
CREAT SPEC-SCNC: 84 MG/DL
CREAT/PROT UR: 0.4 RATIO
EOSINOPHIL # BLD AUTO: 0.28 K/UL
EOSINOPHIL NFR BLD AUTO: 3.5 %
GLUCOSE QUALITATIVE U: ABNORMAL
GLUCOSE SERPL-MCNC: 305 MG/DL
HCT VFR BLD CALC: 45.4 %
HGB BLD-MCNC: 14.3 G/DL
HYALINE CASTS: 0 /LPF
IMM GRANULOCYTES NFR BLD AUTO: 0.5 %
KETONES URINE: NEGATIVE
LDH SERPL-CCNC: 238 U/L
LEUKOCYTE ESTERASE URINE: NEGATIVE
LYMPHOCYTES # BLD AUTO: 1.21 K/UL
LYMPHOCYTES NFR BLD AUTO: 15.2 %
MAGNESIUM SERPL-MCNC: 1.7 MG/DL
MAN DIFF?: NORMAL
MCHC RBC-ENTMCNC: 29.4 PG
MCHC RBC-ENTMCNC: 31.5 GM/DL
MCV RBC AUTO: 93.2 FL
MICROSCOPIC-UA: NORMAL
MONOCYTES # BLD AUTO: 0.81 K/UL
MONOCYTES NFR BLD AUTO: 10.2 %
NEUTROPHILS # BLD AUTO: 5.56 K/UL
NEUTROPHILS NFR BLD AUTO: 70.1 %
NITRITE URINE: NEGATIVE
PARATHYROID HORMONE INTACT: 231 PG/ML
PH URINE: 6.5
PHOSPHATE SERPL-MCNC: 2.8 MG/DL
PLATELET # BLD AUTO: 168 K/UL
POTASSIUM SERPL-SCNC: 5.2 MMOL/L
PROT SERPL-MCNC: 6.4 G/DL
PROT UR-MCNC: 30 MG/DL
PROTEIN URINE: ABNORMAL
RBC # BLD: 4.87 M/UL
RBC # FLD: 14.6 %
RED BLOOD CELLS URINE: 1 /HPF
SODIUM SERPL-SCNC: 139 MMOL/L
SPECIFIC GRAVITY URINE: 1.02
SQUAMOUS EPITHELIAL CELLS: 0 /HPF
TACROLIMUS SERPL-MCNC: 7.8 NG/ML
URATE SERPL-MCNC: 3.9 MG/DL
UROBILINOGEN URINE: NORMAL
WBC # FLD AUTO: 7.94 K/UL
WHITE BLOOD CELLS URINE: 1 /HPF

## 2021-03-19 ENCOUNTER — NON-APPOINTMENT (OUTPATIENT)
Age: 41
End: 2021-03-19

## 2021-03-19 LAB
BKV DNA SPEC QL NAA+PROBE: 4840 COPIES/ML
CMV DNA SPEC QL NAA+PROBE: NOT DETECTED IU/ML
LOG 10 BK QUANTITATION PCR: 3.69

## 2021-05-03 ENCOUNTER — APPOINTMENT (OUTPATIENT)
Dept: TRANSPLANT | Facility: CLINIC | Age: 41
End: 2021-05-03

## 2021-05-05 LAB
25(OH)D3 SERPL-MCNC: 32.5 NG/ML
ALBUMIN SERPL ELPH-MCNC: 4.3 G/DL
ALP BLD-CCNC: 141 U/L
ALT SERPL-CCNC: 22 U/L
ANION GAP SERPL CALC-SCNC: 10 MMOL/L
APPEARANCE: CLEAR
AST SERPL-CCNC: 23 U/L
BACTERIA: NEGATIVE
BASOPHILS # BLD AUTO: 0.03 K/UL
BASOPHILS NFR BLD AUTO: 0.3 %
BILIRUB SERPL-MCNC: 0.5 MG/DL
BILIRUBIN URINE: NEGATIVE
BLOOD URINE: NEGATIVE
BUN SERPL-MCNC: 14 MG/DL
CALCIUM SERPL-MCNC: 10.2 MG/DL
CALCIUM SERPL-MCNC: 10.2 MG/DL
CHLORIDE SERPL-SCNC: 106 MMOL/L
CHOLEST SERPL-MCNC: 136 MG/DL
CMV DNA SPEC QL NAA+PROBE: NOT DETECTED IU/ML
CO2 SERPL-SCNC: 25 MMOL/L
COLOR: YELLOW
CREAT SERPL-MCNC: 1.15 MG/DL
CREAT SPEC-SCNC: 140 MG/DL
CREAT/PROT UR: 0.2 RATIO
EOSINOPHIL # BLD AUTO: 0.16 K/UL
EOSINOPHIL NFR BLD AUTO: 1.9 %
ESTIMATED AVERAGE GLUCOSE: 209 MG/DL
GLUCOSE QUALITATIVE U: ABNORMAL
GLUCOSE SERPL-MCNC: 106 MG/DL
HBA1C MFR BLD HPLC: 8.9 %
HCT VFR BLD CALC: 43.3 %
HDLC SERPL-MCNC: 60 MG/DL
HGB BLD-MCNC: 14.1 G/DL
HYALINE CASTS: 0 /LPF
IMM GRANULOCYTES NFR BLD AUTO: 1.4 %
KETONES URINE: NEGATIVE
LDH SERPL-CCNC: 243 U/L
LDLC SERPL CALC-MCNC: 60 MG/DL
LEUKOCYTE ESTERASE URINE: NEGATIVE
LYMPHOCYTES # BLD AUTO: 1.47 K/UL
LYMPHOCYTES NFR BLD AUTO: 17 %
MAGNESIUM SERPL-MCNC: 1.6 MG/DL
MAN DIFF?: NORMAL
MCHC RBC-ENTMCNC: 29.8 PG
MCHC RBC-ENTMCNC: 32.6 GM/DL
MCV RBC AUTO: 91.5 FL
MICROSCOPIC-UA: NORMAL
MONOCYTES # BLD AUTO: 0.78 K/UL
MONOCYTES NFR BLD AUTO: 9 %
NEUTROPHILS # BLD AUTO: 6.08 K/UL
NEUTROPHILS NFR BLD AUTO: 70.4 %
NITRITE URINE: NEGATIVE
NONHDLC SERPL-MCNC: 75 MG/DL
PARATHYROID HORMONE INTACT: 139 PG/ML
PH URINE: 6.5
PHOSPHATE SERPL-MCNC: 2.5 MG/DL
PLATELET # BLD AUTO: 220 K/UL
POTASSIUM SERPL-SCNC: 4 MMOL/L
PROT SERPL-MCNC: 6.8 G/DL
PROT UR-MCNC: 22 MG/DL
PROTEIN URINE: NORMAL
RBC # BLD: 4.73 M/UL
RBC # FLD: 14.2 %
RED BLOOD CELLS URINE: 1 /HPF
SODIUM SERPL-SCNC: 140 MMOL/L
SPECIFIC GRAVITY URINE: 1.02
SQUAMOUS EPITHELIAL CELLS: 0 /HPF
TACROLIMUS SERPL-MCNC: 7 NG/ML
TRIGL SERPL-MCNC: 79 MG/DL
URATE SERPL-MCNC: 4.9 MG/DL
UROBILINOGEN URINE: NORMAL
WBC # FLD AUTO: 8.64 K/UL
WHITE BLOOD CELLS URINE: 1 /HPF

## 2021-05-06 ENCOUNTER — APPOINTMENT (OUTPATIENT)
Dept: NEPHROLOGY | Facility: CLINIC | Age: 41
End: 2021-05-06
Payer: MEDICARE

## 2021-05-06 VITALS — WEIGHT: 158 LBS | SYSTOLIC BLOOD PRESSURE: 110 MMHG | BODY MASS INDEX: 27.99 KG/M2 | DIASTOLIC BLOOD PRESSURE: 70 MMHG

## 2021-05-06 PROCEDURE — 99214 OFFICE O/P EST MOD 30 MIN: CPT

## 2021-05-06 NOTE — HISTORY OF PRESENT ILLNESS
[ Donor] :  donor [Basiliximab] : basiliximab [Positive/Positive] : Donor Positive/Recipient Positive [PHS Increased Risk] : Public Health Service increased risk [Hepatitis C] : hepatitis c [Brain Death] : brain death [Received on Pump] : organ received on pump [KDPI: ____] : Kidney Donor Profile Index: [unfilled] [Cold Ischemic Time: ____] : Cold Ischemic Time: [unfilled] [ABO Incompatible] : ABO compatible [TextBox_34] : DGF requiring HD for hyperkalemia [de-identified] : 40 male, hx DM1, HTN on dialysis since 2013.  did well post transplant but had DGF.  \par \par 2/2020 pt developed urinary retention and required hightower catheter placement.  Saw urology and had catheter removed March 13th.  \par \par Interval events: \par Pt feels well.  Creatinine now down to 1.1.  Tacro 7, A1c up to 8.9.  Pt admits to eating about 10-15 tortillas per day.

## 2021-05-06 NOTE — PHYSICAL EXAM
[General Appearance - Alert] : alert [General Appearance - In No Acute Distress] : in no acute distress [General Appearance - Well Nourished] : well nourished [Sclera] : the sclera and conjunctiva were normal [Extraocular Movements] : extraocular movements were intact [Outer Ear] : the ears and nose were normal in appearance [Hearing Threshold Finger Rub Not [place]] : hearing was normal [Examination Of The Oral Cavity] : the lips and gums were normal [Neck Appearance] : the appearance of the neck was normal [Neck Cervical Mass (___cm)] : no neck mass was observed [Jugular Venous Distention Increased] : there was no jugular-venous distention [Thyroid Diffuse Enlargement] : the thyroid was not enlarged [Respiration, Rhythm And Depth] : normal respiratory rhythm and effort [Auscultation Breath Sounds / Voice Sounds] : lungs were clear to auscultation bilaterally [Chest Palpation] : palpation of the chest revealed no abnormalities [Heart Rate And Rhythm] : heart rate was normal and rhythm regular [Heart Sounds] : normal S1 and S2 [Heart Sounds Gallop] : no gallops [Bowel Sounds] : normal bowel sounds [Abdomen Soft] : soft [Abdomen Tenderness] : non-tender [Cervical Lymph Nodes Enlarged Posterior Bilaterally] : posterior cervical [Cervical Lymph Nodes Enlarged Anterior Bilaterally] : anterior cervical [Supraclavicular Lymph Nodes Enlarged Bilaterally] : supraclavicular [No CVA Tenderness] : no ~M costovertebral angle tenderness [Abnormal Walk] : normal gait [Nail Clubbing] : no clubbing  or cyanosis of the fingernails [Musculoskeletal - Swelling] : no joint swelling seen [Skin Color & Pigmentation] : normal skin color and pigmentation [Skin Turgor] : normal skin turgor [] : no rash [Cranial Nerves] : cranial nerves 2-12 were intact [No Focal Deficits] : no focal deficits [Oriented To Time, Place, And Person] : oriented to person, place, and time [Impaired Insight] : insight and judgment were intact [Affect] : the affect was normal [Mood] : the mood was normal [FreeTextEntry1] : trace right LE edema.

## 2021-05-06 NOTE — ASSESSMENT
[FreeTextEntry1] : 1.  Renal transplant - Pt with DGF which has resolved.  Creatinine nadired at 1, runs 1 - 1.2.  \par 2.  Immunosuppression - simulect induction, cont tac for target 4-6 and pred 5.  Tacro 7 which is slightly above target - will not change.  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   \par 3.  HTN - controlled\par 4.  DM1 - Blood glucose high.  Increase novolog to 15 units before lunch.  Reduce tortilla intake. \par 5.  Hyperkalmemia - cont veltassa - pt taking every other day.     \par 6.  HCV - completed therapy, last pcr ND\par 7.  BK - off of MMF.  Last vl 4000, await repeat.  Immuno minimized.  \par 8.  Urinary retention - on flomax.  Now following with urology. \par 9.  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  Hypovitaminosis D - now replete. \par \par Labs in 2 months and f/u in 3 months. \par Asked pt again to schedule f/u with Dr. Randolph

## 2021-05-07 ENCOUNTER — NON-APPOINTMENT (OUTPATIENT)
Age: 41
End: 2021-05-07

## 2021-05-07 LAB
BKV DNA SPEC QL NAA+PROBE: 9150 COPIES/ML
LOG 10 BK QUANTITATION PCR: 3.96

## 2021-05-19 ENCOUNTER — APPOINTMENT (OUTPATIENT)
Dept: TRANSPLANT | Facility: CLINIC | Age: 41
End: 2021-05-19

## 2021-05-20 LAB
ALBUMIN SERPL ELPH-MCNC: 4.5 G/DL
ALP BLD-CCNC: 156 U/L
ALT SERPL-CCNC: 19 U/L
ANION GAP SERPL CALC-SCNC: 11 MMOL/L
APPEARANCE: CLEAR
AST SERPL-CCNC: 24 U/L
BACTERIA: NEGATIVE
BASOPHILS # BLD AUTO: 0.05 K/UL
BASOPHILS NFR BLD AUTO: 0.5 %
BILIRUB SERPL-MCNC: 0.4 MG/DL
BILIRUBIN URINE: NEGATIVE
BLOOD URINE: NEGATIVE
BUN SERPL-MCNC: 17 MG/DL
CALCIUM SERPL-MCNC: 10.8 MG/DL
CALCIUM SERPL-MCNC: 10.8 MG/DL
CHLORIDE SERPL-SCNC: 107 MMOL/L
CO2 SERPL-SCNC: 25 MMOL/L
COLOR: NORMAL
COVID-19 SPIKE DOMAIN ANTIBODY INTERPRETATION: POSITIVE
CREAT SERPL-MCNC: 1.19 MG/DL
CREAT SPEC-SCNC: 99 MG/DL
CREAT/PROT UR: 0.2 RATIO
EOSINOPHIL # BLD AUTO: 0.13 K/UL
EOSINOPHIL NFR BLD AUTO: 1.3 %
GLUCOSE QUALITATIVE U: ABNORMAL
GLUCOSE SERPL-MCNC: 151 MG/DL
HCT VFR BLD CALC: 45.5 %
HGB BLD-MCNC: 14.6 G/DL
HYALINE CASTS: 0 /LPF
IMM GRANULOCYTES NFR BLD AUTO: 1.2 %
KETONES URINE: NEGATIVE
LDH SERPL-CCNC: 268 U/L
LEUKOCYTE ESTERASE URINE: NEGATIVE
LYMPHOCYTES # BLD AUTO: 1.18 K/UL
LYMPHOCYTES NFR BLD AUTO: 12 %
MAGNESIUM SERPL-MCNC: 1.9 MG/DL
MAN DIFF?: NORMAL
MCHC RBC-ENTMCNC: 30.5 PG
MCHC RBC-ENTMCNC: 32.1 GM/DL
MCV RBC AUTO: 95 FL
MICROSCOPIC-UA: NORMAL
MONOCYTES # BLD AUTO: 0.93 K/UL
MONOCYTES NFR BLD AUTO: 9.5 %
NEUTROPHILS # BLD AUTO: 7.41 K/UL
NEUTROPHILS NFR BLD AUTO: 75.5 %
NITRITE URINE: NEGATIVE
PARATHYROID HORMONE INTACT: 114 PG/ML
PH URINE: 6.5
PHOSPHATE SERPL-MCNC: 2.6 MG/DL
PLATELET # BLD AUTO: 204 K/UL
POTASSIUM SERPL-SCNC: 4.6 MMOL/L
PROT SERPL-MCNC: 7.2 G/DL
PROT UR-MCNC: 18 MG/DL
PROTEIN URINE: NORMAL
RBC # BLD: 4.79 M/UL
RBC # FLD: 14.5 %
RED BLOOD CELLS URINE: 1 /HPF
SARS-COV-2 AB SERPL IA-ACNC: >250 U/ML
SODIUM SERPL-SCNC: 143 MMOL/L
SPECIFIC GRAVITY URINE: 1.02
SQUAMOUS EPITHELIAL CELLS: 0 /HPF
TACROLIMUS SERPL-MCNC: 4.2 NG/ML
URATE SERPL-MCNC: 5.6 MG/DL
UROBILINOGEN URINE: NORMAL
WBC # FLD AUTO: 9.82 K/UL
WHITE BLOOD CELLS URINE: 3 /HPF

## 2021-05-21 LAB — CMV DNA SPEC QL NAA+PROBE: NOT DETECTED IU/ML

## 2021-05-24 LAB
BKV DNA SPEC QL NAA+PROBE: 9640 COPIES/ML
LOG 10 BK QUANTITATION PCR: 3.98

## 2021-06-08 ENCOUNTER — APPOINTMENT (OUTPATIENT)
Dept: UROLOGY | Facility: CLINIC | Age: 41
End: 2021-06-08

## 2021-08-09 ENCOUNTER — APPOINTMENT (OUTPATIENT)
Dept: TRANSPLANT | Facility: CLINIC | Age: 41
End: 2021-08-09

## 2021-08-11 ENCOUNTER — NON-APPOINTMENT (OUTPATIENT)
Age: 41
End: 2021-08-11

## 2021-08-12 ENCOUNTER — APPOINTMENT (OUTPATIENT)
Dept: NEPHROLOGY | Facility: CLINIC | Age: 41
End: 2021-08-12

## 2021-08-12 LAB
25(OH)D3 SERPL-MCNC: 20.5 NG/ML
ALBUMIN SERPL ELPH-MCNC: 4.6 G/DL
ALP BLD-CCNC: 157 U/L
ALT SERPL-CCNC: 18 U/L
ANION GAP SERPL CALC-SCNC: 12 MMOL/L
APPEARANCE: CLEAR
AST SERPL-CCNC: 21 U/L
BACTERIA: NEGATIVE
BASOPHILS # BLD AUTO: 0.05 K/UL
BASOPHILS NFR BLD AUTO: 0.6 %
BILIRUB SERPL-MCNC: 0.5 MG/DL
BILIRUBIN URINE: NEGATIVE
BLOOD URINE: NEGATIVE
BUN SERPL-MCNC: 13 MG/DL
CALCIUM SERPL-MCNC: 10.5 MG/DL
CALCIUM SERPL-MCNC: 10.5 MG/DL
CHLORIDE SERPL-SCNC: 105 MMOL/L
CHOLEST SERPL-MCNC: 154 MG/DL
CMV DNA SPEC QL NAA+PROBE: NOT DETECTED IU/ML
CO2 SERPL-SCNC: 23 MMOL/L
COLOR: YELLOW
CREAT SERPL-MCNC: 1.07 MG/DL
CREAT SPEC-SCNC: 171 MG/DL
CREAT/PROT UR: 0.4 RATIO
EOSINOPHIL # BLD AUTO: 0.05 K/UL
EOSINOPHIL NFR BLD AUTO: 0.6 %
ESTIMATED AVERAGE GLUCOSE: 183 MG/DL
GLUCOSE QUALITATIVE U: ABNORMAL
GLUCOSE SERPL-MCNC: 153 MG/DL
HBA1C MFR BLD HPLC: 8 %
HCT VFR BLD CALC: 47.1 %
HDLC SERPL-MCNC: 53 MG/DL
HGB BLD-MCNC: 15.4 G/DL
HYALINE CASTS: 0 /LPF
IMM GRANULOCYTES NFR BLD AUTO: 0.7 %
KETONES URINE: NEGATIVE
LDH SERPL-CCNC: 266 U/L
LDLC SERPL CALC-MCNC: 71 MG/DL
LEUKOCYTE ESTERASE URINE: NEGATIVE
LYMPHOCYTES # BLD AUTO: 1.01 K/UL
LYMPHOCYTES NFR BLD AUTO: 12 %
MAGNESIUM SERPL-MCNC: 1.8 MG/DL
MAN DIFF?: NORMAL
MCHC RBC-ENTMCNC: 29.8 PG
MCHC RBC-ENTMCNC: 32.7 GM/DL
MCV RBC AUTO: 91.1 FL
MICROSCOPIC-UA: NORMAL
MONOCYTES # BLD AUTO: 0.53 K/UL
MONOCYTES NFR BLD AUTO: 6.3 %
NEUTROPHILS # BLD AUTO: 6.72 K/UL
NEUTROPHILS NFR BLD AUTO: 79.8 %
NITRITE URINE: NEGATIVE
NONHDLC SERPL-MCNC: 101 MG/DL
PARATHYROID HORMONE INTACT: 139 PG/ML
PH URINE: 6.5
PHOSPHATE SERPL-MCNC: 1.3 MG/DL
PLATELET # BLD AUTO: 238 K/UL
POTASSIUM SERPL-SCNC: 4.9 MMOL/L
PROT SERPL-MCNC: 7.2 G/DL
PROT UR-MCNC: 69 MG/DL
PROTEIN URINE: ABNORMAL
RBC # BLD: 5.17 M/UL
RBC # FLD: 13.4 %
RED BLOOD CELLS URINE: 3 /HPF
SODIUM SERPL-SCNC: 139 MMOL/L
SPECIFIC GRAVITY URINE: 1.02
SQUAMOUS EPITHELIAL CELLS: 1 /HPF
TACROLIMUS SERPL-MCNC: 32.9 NG/ML
TRIGL SERPL-MCNC: 152 MG/DL
URATE SERPL-MCNC: 4.7 MG/DL
UROBILINOGEN URINE: ABNORMAL
WBC # FLD AUTO: 8.42 K/UL
WHITE BLOOD CELLS URINE: 1 /HPF

## 2021-08-17 LAB
BKV DNA SPEC QL NAA+PROBE: 8530 COPIES/ML
LOG 10 BK QUANTITATION PCR: 3.93

## 2021-08-24 ENCOUNTER — APPOINTMENT (OUTPATIENT)
Dept: NEPHROLOGY | Facility: CLINIC | Age: 41
End: 2021-08-24
Payer: MEDICARE

## 2021-08-24 ENCOUNTER — NON-APPOINTMENT (OUTPATIENT)
Age: 41
End: 2021-08-24

## 2021-08-24 VITALS
DIASTOLIC BLOOD PRESSURE: 76 MMHG | TEMPERATURE: 97.9 F | BODY MASS INDEX: 28.7 KG/M2 | HEART RATE: 89 BPM | RESPIRATION RATE: 15 BRPM | WEIGHT: 162 LBS | SYSTOLIC BLOOD PRESSURE: 113 MMHG | OXYGEN SATURATION: 99 % | HEIGHT: 63 IN

## 2021-08-24 LAB
ALBUMIN SERPL ELPH-MCNC: 4.4 G/DL
ALP BLD-CCNC: 126 U/L
ALT SERPL-CCNC: 18 U/L
ANION GAP SERPL CALC-SCNC: 15 MMOL/L
APPEARANCE: CLEAR
AST SERPL-CCNC: 24 U/L
BACTERIA: NEGATIVE
BASOPHILS # BLD AUTO: 0.04 K/UL
BASOPHILS NFR BLD AUTO: 0.5 %
BILIRUB SERPL-MCNC: 0.5 MG/DL
BILIRUBIN URINE: NEGATIVE
BLOOD URINE: NEGATIVE
BUN SERPL-MCNC: 18 MG/DL
CALCIUM SERPL-MCNC: 10.2 MG/DL
CHLORIDE SERPL-SCNC: 108 MMOL/L
CO2 SERPL-SCNC: 22 MMOL/L
COLOR: NORMAL
CREAT SERPL-MCNC: 1.08 MG/DL
CREAT SPEC-SCNC: 98 MG/DL
CREAT/PROT UR: 0.4 RATIO
EOSINOPHIL # BLD AUTO: 0.14 K/UL
EOSINOPHIL NFR BLD AUTO: 1.6 %
GLUCOSE QUALITATIVE U: ABNORMAL
GLUCOSE SERPL-MCNC: 95 MG/DL
HCT VFR BLD CALC: 47 %
HGB BLD-MCNC: 15.2 G/DL
HYALINE CASTS: 0 /LPF
IMM GRANULOCYTES NFR BLD AUTO: 0.6 %
KETONES URINE: NEGATIVE
LDH SERPL-CCNC: 269 U/L
LEUKOCYTE ESTERASE URINE: NEGATIVE
LYMPHOCYTES # BLD AUTO: 1.67 K/UL
LYMPHOCYTES NFR BLD AUTO: 19.3 %
MAGNESIUM SERPL-MCNC: 1.7 MG/DL
MAN DIFF?: NORMAL
MCHC RBC-ENTMCNC: 30 PG
MCHC RBC-ENTMCNC: 32.3 GM/DL
MCV RBC AUTO: 92.7 FL
MICROSCOPIC-UA: NORMAL
MONOCYTES # BLD AUTO: 0.78 K/UL
MONOCYTES NFR BLD AUTO: 9 %
NEUTROPHILS # BLD AUTO: 5.97 K/UL
NEUTROPHILS NFR BLD AUTO: 69 %
NITRITE URINE: NEGATIVE
PH URINE: 6
PHOSPHATE SERPL-MCNC: 2.6 MG/DL
PLATELET # BLD AUTO: 226 K/UL
POTASSIUM SERPL-SCNC: 4.3 MMOL/L
PROT SERPL-MCNC: 7.1 G/DL
PROT UR-MCNC: 42 MG/DL
PROTEIN URINE: ABNORMAL
RBC # BLD: 5.07 M/UL
RBC # FLD: 13.6 %
RED BLOOD CELLS URINE: 2 /HPF
SODIUM SERPL-SCNC: 145 MMOL/L
SPECIFIC GRAVITY URINE: 1.02
SQUAMOUS EPITHELIAL CELLS: 0 /HPF
TACROLIMUS SERPL-MCNC: 4.6 NG/ML
URATE SERPL-MCNC: 5 MG/DL
UROBILINOGEN URINE: NORMAL
WBC # FLD AUTO: 8.65 K/UL
WHITE BLOOD CELLS URINE: 2 /HPF

## 2021-08-24 PROCEDURE — 99214 OFFICE O/P EST MOD 30 MIN: CPT

## 2021-08-24 RX ORDER — PATIROMER 8.4 G/1
8.4 POWDER, FOR SUSPENSION ORAL DAILY
Qty: 90 | Refills: 1 | Status: DISCONTINUED | COMMUNITY
Start: 2019-06-19 | End: 2021-08-24

## 2021-08-24 NOTE — HISTORY OF PRESENT ILLNESS
[ Donor] :  donor [Basiliximab] : basiliximab [Positive/Positive] : Donor Positive/Recipient Positive [PHS Increased Risk] : Public Health Service increased risk [Hepatitis C] : hepatitis c [Brain Death] : brain death [Received on Pump] : organ received on pump [KDPI: ____] : Kidney Donor Profile Index: [unfilled] [Cold Ischemic Time: ____] : Cold Ischemic Time: [unfilled] [ABO Incompatible] : ABO compatible [100: Normal, no complaints, no evidence of disease.] : 100: Normal, no complaints, no evidence of disease. [TextBox_34] : DGF requiring HD for hyperkalemia [de-identified] : 40 male, hx DM1, HTN on dialysis since 2013.  did well post transplant but had DGF.  \par \par 2/2020 pt developed urinary retention and required hightower catheter placement.  Saw urology and had catheter removed March 13th.  \par \par Interval events: \par Pt feels well.  Creatinine now down to 1.1.  Tacro was not true trough, A1c improved to 8.  Diet has improved.  BK 8000.

## 2021-08-24 NOTE — PHYSICAL EXAM
[General Appearance - Alert] : alert [General Appearance - In No Acute Distress] : in no acute distress [General Appearance - Well Nourished] : well nourished [Extraocular Movements] : extraocular movements were intact [Sclera] : the sclera and conjunctiva were normal [Outer Ear] : the ears and nose were normal in appearance [Hearing Threshold Finger Rub Not [place]] : hearing was normal [Examination Of The Oral Cavity] : the lips and gums were normal [Neck Appearance] : the appearance of the neck was normal [Neck Cervical Mass (___cm)] : no neck mass was observed [Jugular Venous Distention Increased] : there was no jugular-venous distention [Thyroid Diffuse Enlargement] : the thyroid was not enlarged [Respiration, Rhythm And Depth] : normal respiratory rhythm and effort [Auscultation Breath Sounds / Voice Sounds] : lungs were clear to auscultation bilaterally [Chest Palpation] : palpation of the chest revealed no abnormalities [Heart Rate And Rhythm] : heart rate was normal and rhythm regular [Heart Sounds] : normal S1 and S2 [Heart Sounds Gallop] : no gallops [Bowel Sounds] : normal bowel sounds [Abdomen Soft] : soft [Abdomen Tenderness] : non-tender [Cervical Lymph Nodes Enlarged Posterior Bilaterally] : posterior cervical [Cervical Lymph Nodes Enlarged Anterior Bilaterally] : anterior cervical [Supraclavicular Lymph Nodes Enlarged Bilaterally] : supraclavicular [No CVA Tenderness] : no ~M costovertebral angle tenderness [Abnormal Walk] : normal gait [Nail Clubbing] : no clubbing  or cyanosis of the fingernails [Musculoskeletal - Swelling] : no joint swelling seen [Skin Color & Pigmentation] : normal skin color and pigmentation [Skin Turgor] : normal skin turgor [] : no rash [Cranial Nerves] : cranial nerves 2-12 were intact [No Focal Deficits] : no focal deficits [Oriented To Time, Place, And Person] : oriented to person, place, and time [Impaired Insight] : insight and judgment were intact [Affect] : the affect was normal [Mood] : the mood was normal [FreeTextEntry1] : trace right LE edema.

## 2021-08-24 NOTE — ASSESSMENT
[FreeTextEntry1] : 1.  Renal transplant - Pt with DGF which has resolved.  Creatinine nadired at 1, runs 1 - 1.2.  \par 2.  Immunosuppression - simulect induction, cont tac for target 4-6 and pred 5.  check tacrolimus trough today as last level was not a trough.  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   \par 3.  HTN - controlled\par 4.  DM1 - Blood glucose improving but A1c still above goal.  \par 5.  Hyperkalmemia - now controlled with diet.      \par 6.  HCV - completed therapy, last pcr ND\par 7.  BK - off of MMF.  Last vl 8000, await repeat.  Immuno minimized.  \par 8.  Urinary retention - on flomax.  Now following with urology. \par 9.  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  Hypovitaminosis D - start ergocalciferol for 6 weeks. \par \par Labs monthly and f/u in 3 months. \par Pt will f/u with Dr. Randolph in 6 months.

## 2021-08-27 LAB
BKV DNA SPEC QL NAA+PROBE: 4060 COPIES/ML
CMV DNA SPEC QL NAA+PROBE: NOT DETECTED IU/ML
LOG 10 BK QUANTITATION PCR: 3.61

## 2021-10-29 ENCOUNTER — RX RENEWAL (OUTPATIENT)
Age: 41
End: 2021-10-29

## 2021-11-23 ENCOUNTER — APPOINTMENT (OUTPATIENT)
Dept: NEPHROLOGY | Facility: CLINIC | Age: 41
End: 2021-11-23
Payer: MEDICARE

## 2021-11-23 VITALS
BODY MASS INDEX: 29.81 KG/M2 | TEMPERATURE: 97 F | WEIGHT: 162 LBS | SYSTOLIC BLOOD PRESSURE: 130 MMHG | HEIGHT: 62 IN | HEART RATE: 87 BPM | DIASTOLIC BLOOD PRESSURE: 86 MMHG | RESPIRATION RATE: 15 BRPM | OXYGEN SATURATION: 98 %

## 2021-11-23 PROCEDURE — 99214 OFFICE O/P EST MOD 30 MIN: CPT

## 2021-11-23 RX ORDER — ATOVAQUONE 750 MG/5ML
750 SUSPENSION ORAL
Qty: 2 | Refills: 3 | Status: DISCONTINUED | COMMUNITY
Start: 2019-12-05 | End: 2021-11-23

## 2021-11-23 NOTE — PHYSICAL EXAM
[General Appearance - Alert] : alert [General Appearance - In No Acute Distress] : in no acute distress [General Appearance - Well Nourished] : well nourished [Sclera] : the sclera and conjunctiva were normal [Extraocular Movements] : extraocular movements were intact [Outer Ear] : the ears and nose were normal in appearance [Hearing Threshold Finger Rub Not [place]] : hearing was normal [Examination Of The Oral Cavity] : the lips and gums were normal [Neck Appearance] : the appearance of the neck was normal [Neck Cervical Mass (___cm)] : no neck mass was observed [Jugular Venous Distention Increased] : there was no jugular-venous distention [Thyroid Diffuse Enlargement] : the thyroid was not enlarged [Respiration, Rhythm And Depth] : normal respiratory rhythm and effort [Auscultation Breath Sounds / Voice Sounds] : lungs were clear to auscultation bilaterally [Chest Palpation] : palpation of the chest revealed no abnormalities [Heart Rate And Rhythm] : heart rate was normal and rhythm regular [Heart Sounds] : normal S1 and S2 [Heart Sounds Gallop] : no gallops [Bowel Sounds] : normal bowel sounds [Abdomen Soft] : soft [Abdomen Tenderness] : non-tender [Cervical Lymph Nodes Enlarged Posterior Bilaterally] : posterior cervical [Cervical Lymph Nodes Enlarged Anterior Bilaterally] : anterior cervical [Supraclavicular Lymph Nodes Enlarged Bilaterally] : supraclavicular [No CVA Tenderness] : no ~M costovertebral angle tenderness [Abnormal Walk] : normal gait [Nail Clubbing] : no clubbing  or cyanosis of the fingernails [Musculoskeletal - Swelling] : no joint swelling seen [Skin Color & Pigmentation] : normal skin color and pigmentation [Skin Turgor] : normal skin turgor [] : no rash [Cranial Nerves] : cranial nerves 2-12 were intact [No Focal Deficits] : no focal deficits [Oriented To Time, Place, And Person] : oriented to person, place, and time [Impaired Insight] : insight and judgment were intact [Affect] : the affect was normal [Mood] : the mood was normal [FreeTextEntry1] : trace right LE edema.

## 2021-11-23 NOTE — ASSESSMENT
[FreeTextEntry1] : 1.  Renal transplant - Pt with DGF which has resolved.  Creatinine nadired at 1, runs 1 - 1.2.  \par 2.  Immunosuppression - simulect induction, cont tac for target 4-6 and pred 5.   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   \par 3.  HTN - controlled\par 4.  DM1 - Blood glucose improving but A1c still above goal.  \par 5.  Hyperkalmemia - now controlled with diet.      \par 6.  HCV - completed therapy, last pcr ND\par 7.  BK - off of MMF.  Last vl 4060, await repeat.  Immuno minimized.  \par 8.  Urinary retention - on flomax.  Now following with urology. \par 9.  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 \par Pt will f/u with Dr. Randolph in 6 months.

## 2021-11-23 NOTE — HISTORY OF PRESENT ILLNESS
[ Donor] :  donor [Basiliximab] : basiliximab [Positive/Positive] : Donor Positive/Recipient Positive [Hepatitis C] : hepatitis c [Brain Death] : brain death [Received on Pump] : organ received on pump [KDPI: ____] : Kidney Donor Profile Index: [unfilled] [Cold Ischemic Time: ____] : Cold Ischemic Time: [unfilled] [100: Normal, no complaints, no evidence of disease.] : 100: Normal, no complaints, no evidence of disease. [PHS Increased Risk] : Public Health Service increased risk [ABO Incompatible] : ABO compatible [TextBox_34] : DGF requiring HD for hyperkalemia [de-identified] : 40 male, hx DM1, HTN on dialysis since 2013.  did well post transplant but had DGF.  \par \par 2/2020 pt developed urinary retention and required hightower catheter placement.  Saw urology and had catheter removed March 13th.  \par \par Interval events: \par Pt feels well.  Blood pressure well controlled.  Blood sugar fluctuates.  Urinating normally, no dysuria.

## 2021-11-24 LAB
25(OH)D3 SERPL-MCNC: 26.5 NG/ML
ALBUMIN SERPL ELPH-MCNC: 4.3 G/DL
ALP BLD-CCNC: 124 U/L
ALT SERPL-CCNC: 20 U/L
ANION GAP SERPL CALC-SCNC: 13 MMOL/L
APPEARANCE: CLEAR
AST SERPL-CCNC: 20 U/L
BACTERIA: NEGATIVE
BASOPHILS # BLD AUTO: 0.05 K/UL
BASOPHILS NFR BLD AUTO: 0.5 %
BILIRUB SERPL-MCNC: 0.7 MG/DL
BILIRUBIN URINE: NEGATIVE
BLOOD URINE: NEGATIVE
BUN SERPL-MCNC: 21 MG/DL
CALCIUM SERPL-MCNC: 10.5 MG/DL
CALCIUM SERPL-MCNC: 10.5 MG/DL
CHLORIDE SERPL-SCNC: 104 MMOL/L
CHOLEST SERPL-MCNC: 156 MG/DL
CO2 SERPL-SCNC: 24 MMOL/L
COLOR: YELLOW
CREAT SERPL-MCNC: 1.13 MG/DL
CREAT SPEC-SCNC: 120 MG/DL
CREAT/PROT UR: 0.8 RATIO
EOSINOPHIL # BLD AUTO: 0.13 K/UL
EOSINOPHIL NFR BLD AUTO: 1.4 %
ESTIMATED AVERAGE GLUCOSE: 192 MG/DL
GLUCOSE QUALITATIVE U: ABNORMAL
GLUCOSE SERPL-MCNC: 268 MG/DL
HBA1C MFR BLD HPLC: 8.3 %
HCT VFR BLD CALC: 46.9 %
HDLC SERPL-MCNC: 55 MG/DL
HGB BLD-MCNC: 15.3 G/DL
HYALINE CASTS: 2 /LPF
IMM GRANULOCYTES NFR BLD AUTO: 0.5 %
KETONES URINE: NEGATIVE
LDH SERPL-CCNC: 230 U/L
LDLC SERPL CALC-MCNC: 73 MG/DL
LEUKOCYTE ESTERASE URINE: NEGATIVE
LYMPHOCYTES # BLD AUTO: 1.23 K/UL
LYMPHOCYTES NFR BLD AUTO: 13.3 %
MAGNESIUM SERPL-MCNC: 1.7 MG/DL
MAN DIFF?: NORMAL
MCHC RBC-ENTMCNC: 29.5 PG
MCHC RBC-ENTMCNC: 32.6 GM/DL
MCV RBC AUTO: 90.5 FL
MICROSCOPIC-UA: NORMAL
MONOCYTES # BLD AUTO: 0.74 K/UL
MONOCYTES NFR BLD AUTO: 8 %
NEUTROPHILS # BLD AUTO: 7.07 K/UL
NEUTROPHILS NFR BLD AUTO: 76.3 %
NITRITE URINE: NEGATIVE
NONHDLC SERPL-MCNC: 101 MG/DL
PARATHYROID HORMONE INTACT: 132 PG/ML
PH URINE: 6.5
PHOSPHATE SERPL-MCNC: 2.7 MG/DL
PLATELET # BLD AUTO: 231 K/UL
POTASSIUM SERPL-SCNC: 4.9 MMOL/L
PROT SERPL-MCNC: 7.2 G/DL
PROT UR-MCNC: 92 MG/DL
PROTEIN URINE: ABNORMAL
RBC # BLD: 5.18 M/UL
RBC # FLD: 13.9 %
RED BLOOD CELLS URINE: 2 /HPF
SODIUM SERPL-SCNC: 141 MMOL/L
SPECIFIC GRAVITY URINE: 1.02
SQUAMOUS EPITHELIAL CELLS: 1 /HPF
TACROLIMUS SERPL-MCNC: 5 NG/ML
TRIGL SERPL-MCNC: 138 MG/DL
URATE SERPL-MCNC: 4.9 MG/DL
UROBILINOGEN URINE: NORMAL
WBC # FLD AUTO: 9.27 K/UL
WHITE BLOOD CELLS URINE: 1 /HPF

## 2021-11-29 LAB — CMV DNA SPEC QL NAA+PROBE: NOT DETECTED IU/ML

## 2021-11-30 ENCOUNTER — NON-APPOINTMENT (OUTPATIENT)
Age: 41
End: 2021-11-30

## 2021-11-30 LAB — BKV DNA SPEC QL NAA+PROBE: 800 COPIES/ML

## 2021-12-21 ENCOUNTER — APPOINTMENT (OUTPATIENT)
Dept: TRANSPLANT | Facility: CLINIC | Age: 41
End: 2021-12-21

## 2022-01-03 ENCOUNTER — APPOINTMENT (OUTPATIENT)
Dept: TRANSPLANT | Facility: CLINIC | Age: 42
End: 2022-01-03

## 2022-01-04 LAB
25(OH)D3 SERPL-MCNC: 18.2 NG/ML
ALBUMIN SERPL ELPH-MCNC: 4.6 G/DL
ALP BLD-CCNC: 138 U/L
ALT SERPL-CCNC: 17 U/L
ANION GAP SERPL CALC-SCNC: 11 MMOL/L
APPEARANCE: CLEAR
AST SERPL-CCNC: 22 U/L
BACTERIA: NEGATIVE
BASOPHILS # BLD AUTO: 0.04 K/UL
BASOPHILS NFR BLD AUTO: 0.5 %
BILIRUB SERPL-MCNC: 0.5 MG/DL
BILIRUBIN URINE: NEGATIVE
BLOOD URINE: NORMAL
BUN SERPL-MCNC: 20 MG/DL
CALCIUM SERPL-MCNC: 10.6 MG/DL
CHLORIDE SERPL-SCNC: 105 MMOL/L
CHOLEST SERPL-MCNC: 183 MG/DL
CO2 SERPL-SCNC: 26 MMOL/L
COLOR: NORMAL
CREAT SERPL-MCNC: 1.01 MG/DL
CREAT SPEC-SCNC: 64 MG/DL
CREAT/PROT UR: 1.2 RATIO
EOSINOPHIL # BLD AUTO: 0.15 K/UL
EOSINOPHIL NFR BLD AUTO: 1.8 %
ESTIMATED AVERAGE GLUCOSE: 232 MG/DL
GLUCOSE QUALITATIVE U: ABNORMAL
GLUCOSE SERPL-MCNC: 124 MG/DL
HBA1C MFR BLD HPLC: 9.7 %
HCT VFR BLD CALC: 47.8 %
HDLC SERPL-MCNC: 57 MG/DL
HGB BLD-MCNC: 15.7 G/DL
HYALINE CASTS: 0 /LPF
IMM GRANULOCYTES NFR BLD AUTO: 0.8 %
KETONES URINE: NEGATIVE
LDLC SERPL CALC-MCNC: 91 MG/DL
LEUKOCYTE ESTERASE URINE: NEGATIVE
LYMPHOCYTES # BLD AUTO: 1.38 K/UL
LYMPHOCYTES NFR BLD AUTO: 16.5 %
MAGNESIUM SERPL-MCNC: 1.8 MG/DL
MAN DIFF?: NORMAL
MCHC RBC-ENTMCNC: 29.1 PG
MCHC RBC-ENTMCNC: 32.8 GM/DL
MCV RBC AUTO: 88.5 FL
MICROSCOPIC-UA: NORMAL
MONOCYTES # BLD AUTO: 0.73 K/UL
MONOCYTES NFR BLD AUTO: 8.7 %
NEUTROPHILS # BLD AUTO: 6 K/UL
NEUTROPHILS NFR BLD AUTO: 71.7 %
NITRITE URINE: NEGATIVE
NONHDLC SERPL-MCNC: 126 MG/DL
PARATHYROID HORMONE INTACT: 118 PG/ML
PH URINE: 6.5
PHOSPHATE SERPL-MCNC: 2.2 MG/DL
PLATELET # BLD AUTO: 229 K/UL
POTASSIUM SERPL-SCNC: 4.3 MMOL/L
PROT SERPL-MCNC: 7.4 G/DL
PROT UR-MCNC: 79 MG/DL
PROTEIN URINE: ABNORMAL
RBC # BLD: 5.4 M/UL
RBC # FLD: 14 %
RED BLOOD CELLS URINE: 3 /HPF
SODIUM SERPL-SCNC: 142 MMOL/L
SPECIFIC GRAVITY URINE: 1.02
SQUAMOUS EPITHELIAL CELLS: 1 /HPF
TACROLIMUS SERPL-MCNC: 4.6 NG/ML
TRIGL SERPL-MCNC: 176 MG/DL
URATE SERPL-MCNC: 5 MG/DL
UROBILINOGEN URINE: NORMAL
WBC # FLD AUTO: 8.37 K/UL
WHITE BLOOD CELLS URINE: 1 /HPF

## 2022-01-04 RX ORDER — INSULIN DETEMIR 100 [IU]/ML
100 INJECTION, SOLUTION SUBCUTANEOUS
Qty: 5 | Refills: 3 | Status: ACTIVE | COMMUNITY
Start: 2019-10-24 | End: 1900-01-01

## 2022-01-06 LAB
BKV DNA SPEC QL NAA+PROBE: ABNORMAL COPIES/ML
CMV DNA SPEC QL NAA+PROBE: NOT DETECTED IU/ML

## 2022-01-07 LAB
COVID-19 SPIKE DOMAIN ANTIBODY INTERPRETATION: POSITIVE
SARS-COV-2 AB SERPL IA-ACNC: >250 U/ML

## 2022-03-03 ENCOUNTER — APPOINTMENT (OUTPATIENT)
Dept: NEPHROLOGY | Facility: CLINIC | Age: 42
End: 2022-03-03

## 2022-04-07 NOTE — REASON FOR VISIT
asthma [Spouse] : spouse [Follow-Up] : a follow-up visit [FreeTextEntry3] :  donor kidney transplant [FreeTextEntry5] : October 22, 2019

## 2022-04-18 NOTE — PROGRESS NOTE ADULT - PROBLEM/PLAN-2
----- Message from India Frank sent at 4/18/2022  2:11 PM CDT -----  Patient called and stated that she was calling the nurse back about a sooner appointment please give her a call back at [phone]    
Spoke to patient and scheduled appt  
DISPLAY PLAN FREE TEXT

## 2022-08-02 ENCOUNTER — NON-APPOINTMENT (OUTPATIENT)
Age: 42
End: 2022-08-02

## 2022-08-02 ENCOUNTER — APPOINTMENT (OUTPATIENT)
Dept: TRANSPLANT | Facility: CLINIC | Age: 42
End: 2022-08-02

## 2022-08-02 LAB
25(OH)D3 SERPL-MCNC: 18.4 NG/ML
ALBUMIN SERPL ELPH-MCNC: 4.5 G/DL
ALP BLD-CCNC: 139 U/L
ALT SERPL-CCNC: 17 U/L
ANION GAP SERPL CALC-SCNC: 11 MMOL/L
AST SERPL-CCNC: 23 U/L
BASOPHILS # BLD AUTO: 0.05 K/UL
BASOPHILS NFR BLD AUTO: 0.6 %
BILIRUB SERPL-MCNC: 0.4 MG/DL
BUN SERPL-MCNC: 29 MG/DL
CALCIUM SERPL-MCNC: 10.6 MG/DL
CALCIUM SERPL-MCNC: 10.6 MG/DL
CHLORIDE SERPL-SCNC: 105 MMOL/L
CHOLEST SERPL-MCNC: 163 MG/DL
CO2 SERPL-SCNC: 24 MMOL/L
CREAT SERPL-MCNC: 1.37 MG/DL
CREAT SPEC-SCNC: 157 MG/DL
CREAT/PROT UR: 0.3 RATIO
EGFR: 66 ML/MIN/1.73M2
EOSINOPHIL # BLD AUTO: 0.15 K/UL
EOSINOPHIL NFR BLD AUTO: 1.7 %
ESTIMATED AVERAGE GLUCOSE: 203 MG/DL
GLUCOSE SERPL-MCNC: 189 MG/DL
HBA1C MFR BLD HPLC: 8.7 %
HCT VFR BLD CALC: 44.6 %
HDLC SERPL-MCNC: 54 MG/DL
HGB BLD-MCNC: 14.6 G/DL
IMM GRANULOCYTES NFR BLD AUTO: 0.6 %
LDLC SERPL CALC-MCNC: 72 MG/DL
LYMPHOCYTES # BLD AUTO: 1.6 K/UL
LYMPHOCYTES NFR BLD AUTO: 17.9 %
MAGNESIUM SERPL-MCNC: 1.7 MG/DL
MAN DIFF?: NORMAL
MCHC RBC-ENTMCNC: 29.3 PG
MCHC RBC-ENTMCNC: 32.7 GM/DL
MCV RBC AUTO: 89.6 FL
MONOCYTES # BLD AUTO: 0.73 K/UL
MONOCYTES NFR BLD AUTO: 8.1 %
NEUTROPHILS # BLD AUTO: 6.38 K/UL
NEUTROPHILS NFR BLD AUTO: 71.1 %
NONHDLC SERPL-MCNC: 109 MG/DL
PARATHYROID HORMONE INTACT: 87 PG/ML
PHOSPHATE SERPL-MCNC: 2.9 MG/DL
PLATELET # BLD AUTO: 230 K/UL
POTASSIUM SERPL-SCNC: 4.4 MMOL/L
PROT SERPL-MCNC: 7.2 G/DL
PROT UR-MCNC: 47 MG/DL
RBC # BLD: 4.98 M/UL
RBC # FLD: 14.6 %
SODIUM SERPL-SCNC: 141 MMOL/L
TACROLIMUS SERPL-MCNC: 8.6 NG/ML
TRIGL SERPL-MCNC: 186 MG/DL
WBC # FLD AUTO: 8.96 K/UL

## 2022-08-03 LAB
APPEARANCE: CLEAR
BACTERIA: NEGATIVE
BILIRUBIN URINE: NEGATIVE
BLOOD URINE: NEGATIVE
CMV DNA SPEC QL NAA+PROBE: NOT DETECTED IU/ML
CMVPCR LOG: NOT DETECTED LOG10IU/ML
COLOR: YELLOW
GLUCOSE QUALITATIVE U: NORMAL
HYALINE CASTS: 0 /LPF
KETONES URINE: NEGATIVE
LEUKOCYTE ESTERASE URINE: NEGATIVE
MICROSCOPIC-UA: NORMAL
NITRITE URINE: NEGATIVE
PH URINE: 6
PROTEIN URINE: ABNORMAL
RED BLOOD CELLS URINE: 2 /HPF
SPECIFIC GRAVITY URINE: 1.02
SQUAMOUS EPITHELIAL CELLS: 0 /HPF
URINE COMMENTS: NORMAL
UROBILINOGEN URINE: ABNORMAL
WHITE BLOOD CELLS URINE: 1 /HPF

## 2022-08-04 ENCOUNTER — APPOINTMENT (OUTPATIENT)
Dept: NEPHROLOGY | Facility: CLINIC | Age: 42
End: 2022-08-04

## 2022-08-04 VITALS
SYSTOLIC BLOOD PRESSURE: 134 MMHG | DIASTOLIC BLOOD PRESSURE: 89 MMHG | TEMPERATURE: 98 F | HEIGHT: 62 IN | HEART RATE: 82 BPM | OXYGEN SATURATION: 98 % | BODY MASS INDEX: 28.71 KG/M2 | RESPIRATION RATE: 16 BRPM | WEIGHT: 156 LBS

## 2022-08-04 DIAGNOSIS — D75.1 SECONDARY POLYCYTHEMIA: ICD-10-CM

## 2022-08-04 LAB — BKV DNA SPEC QL NAA+PROBE: 900 IU/ML

## 2022-08-04 PROCEDURE — 99214 OFFICE O/P EST MOD 30 MIN: CPT

## 2022-08-04 RX ORDER — ERGOCALCIFEROL 1.25 MG/1
1.25 MG CAPSULE ORAL
Qty: 8 | Refills: 1 | Status: ACTIVE | COMMUNITY
Start: 2021-02-08 | End: 1900-01-01

## 2022-08-04 NOTE — PHYSICAL EXAM
[General Appearance - Alert] : alert [General Appearance - In No Acute Distress] : in no acute distress [General Appearance - Well Nourished] : well nourished [Sclera] : the sclera and conjunctiva were normal [Extraocular Movements] : extraocular movements were intact [Outer Ear] : the ears and nose were normal in appearance [Hearing Threshold Finger Rub Not [place]] : hearing was normal [Examination Of The Oral Cavity] : the lips and gums were normal [Neck Appearance] : the appearance of the neck was normal [Neck Cervical Mass (___cm)] : no neck mass was observed [Jugular Venous Distention Increased] : there was no jugular-venous distention [Thyroid Diffuse Enlargement] : the thyroid was not enlarged [Respiration, Rhythm And Depth] : normal respiratory rhythm and effort [Auscultation Breath Sounds / Voice Sounds] : lungs were clear to auscultation bilaterally [Chest Palpation] : palpation of the chest revealed no abnormalities [Heart Rate And Rhythm] : heart rate was normal and rhythm regular [Heart Sounds] : normal S1 and S2 [Heart Sounds Gallop] : no gallops [Bowel Sounds] : normal bowel sounds [Abdomen Soft] : soft [Abdomen Tenderness] : non-tender [Cervical Lymph Nodes Enlarged Posterior Bilaterally] : posterior cervical [Cervical Lymph Nodes Enlarged Anterior Bilaterally] : anterior cervical [Supraclavicular Lymph Nodes Enlarged Bilaterally] : supraclavicular [No CVA Tenderness] : no ~M costovertebral angle tenderness [Abnormal Walk] : normal gait [Nail Clubbing] : no clubbing  or cyanosis of the fingernails [Musculoskeletal - Swelling] : no joint swelling seen [Skin Color & Pigmentation] : normal skin color and pigmentation [Skin Turgor] : normal skin turgor [] : no rash [Cranial Nerves] : cranial nerves 2-12 were intact [No Focal Deficits] : no focal deficits [Oriented To Time, Place, And Person] : oriented to person, place, and time [Impaired Insight] : insight and judgment were intact [Affect] : the affect was normal [Mood] : the mood was normal [FreeTextEntry1] : trace right LE edema.

## 2022-08-04 NOTE — HISTORY OF PRESENT ILLNESS
[ Donor] :  donor [Basiliximab] : basiliximab [Positive/Positive] : Donor Positive/Recipient Positive [PHS Increased Risk] : Public Health Service increased risk [Hepatitis C] : hepatitis c [Brain Death] : brain death [Received on Pump] : organ received on pump [KDPI: ____] : Kidney Donor Profile Index: [unfilled] [Cold Ischemic Time: ____] : Cold Ischemic Time: [unfilled] [100: Normal, no complaints, no evidence of disease.] : 100: Normal, no complaints, no evidence of disease. [ABO Incompatible] : ABO compatible [TextBox_34] : DGF requiring HD for hyperkalemia [de-identified] : 40 male, hx DM1, HTN on dialysis since 2013.  did well post transplant but had DGF.  \par \par 2/2020 pt developed urinary retention and required hightower catheter placement.  Saw urology and had catheter removed March 13th.  \par \par Interval events: \par Pt feels well.  Blood pressure well controlled.  Blood sugar fluctuates.  Urinating normally, no dysuria.  Last creatinine was elevated at 1.3 but tacro trough also high - dose reduced.

## 2022-08-04 NOTE — ASSESSMENT
[FreeTextEntry1] : 1.  Renal transplant - Pt with DGF which has resolved.  Creatinine nadired at 1, runs 1 - 1.2.  Last level high likely due to high tacro.  Will repeat in 1 week locally.  \par 2.  Immunosuppression - simulect induction, cont tac for target 4-6 and pred 5.   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   \par 3.  HTN - controlled\par 4.  DM1 - Blood glucose improving but A1c still above goal.  \par 5.  Hyperkalmemia - now controlled with diet.      \par 6.  HCV - completed therapy, last pcr ND\par 7.  BK - off of MMF.  Last vl 2200, await repeat.  Immuno minimized.  \par 8.  Urinary retention - on flomax.  Now following with urology. \par 9.  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 \par Pt will f/u with Dr. Randolph in 3 months.

## 2022-08-16 ENCOUNTER — APPOINTMENT (OUTPATIENT)
Dept: TRANSPLANT | Facility: CLINIC | Age: 42
End: 2022-08-16

## 2022-08-17 ENCOUNTER — NON-APPOINTMENT (OUTPATIENT)
Age: 42
End: 2022-08-17

## 2022-08-17 LAB
ALBUMIN SERPL ELPH-MCNC: 4.1 G/DL
ALP BLD-CCNC: 138 U/L
ALT SERPL-CCNC: 19 U/L
ANION GAP SERPL CALC-SCNC: 11 MMOL/L
AST SERPL-CCNC: 21 U/L
BASOPHILS # BLD AUTO: 0.04 K/UL
BASOPHILS NFR BLD AUTO: 0.5 %
BILIRUB SERPL-MCNC: 0.4 MG/DL
BUN SERPL-MCNC: 20 MG/DL
CALCIUM SERPL-MCNC: 10.2 MG/DL
CALCIUM SERPL-MCNC: 10.2 MG/DL
CHLORIDE SERPL-SCNC: 106 MMOL/L
CO2 SERPL-SCNC: 23 MMOL/L
CREAT SERPL-MCNC: 0.96 MG/DL
CREAT SPEC-SCNC: 59 MG/DL
CREAT/PROT UR: 0.5 RATIO
EGFR: 101 ML/MIN/1.73M2
EOSINOPHIL # BLD AUTO: 0.14 K/UL
EOSINOPHIL NFR BLD AUTO: 1.7 %
GLUCOSE SERPL-MCNC: 219 MG/DL
HCT VFR BLD CALC: 43.4 %
HGB BLD-MCNC: 14.5 G/DL
IMM GRANULOCYTES NFR BLD AUTO: 1 %
LYMPHOCYTES # BLD AUTO: 1.5 K/UL
LYMPHOCYTES NFR BLD AUTO: 18.5 %
MAGNESIUM SERPL-MCNC: 1.6 MG/DL
MAN DIFF?: NORMAL
MCHC RBC-ENTMCNC: 29.8 PG
MCHC RBC-ENTMCNC: 33.4 GM/DL
MCV RBC AUTO: 89.3 FL
MONOCYTES # BLD AUTO: 0.73 K/UL
MONOCYTES NFR BLD AUTO: 9 %
NEUTROPHILS # BLD AUTO: 5.64 K/UL
NEUTROPHILS NFR BLD AUTO: 69.3 %
PARATHYROID HORMONE INTACT: 131 PG/ML
PHOSPHATE SERPL-MCNC: 2.9 MG/DL
PLATELET # BLD AUTO: 237 K/UL
POTASSIUM SERPL-SCNC: 4.5 MMOL/L
PROT SERPL-MCNC: 6.9 G/DL
PROT UR-MCNC: 27 MG/DL
RBC # BLD: 4.86 M/UL
RBC # FLD: 14.2 %
SODIUM SERPL-SCNC: 139 MMOL/L
TACROLIMUS SERPL-MCNC: 5.5 NG/ML
WBC # FLD AUTO: 8.13 K/UL

## 2022-08-22 LAB
APPEARANCE: CLEAR
BACTERIA: NEGATIVE
BILIRUBIN URINE: NEGATIVE
BKV DNA SPEC QL NAA+PROBE: 600 IU/ML
BLOOD URINE: NEGATIVE
CMV DNA SPEC QL NAA+PROBE: NOT DETECTED IU/ML
CMVPCR LOG: NOT DETECTED LOG10IU/ML
COLOR: NORMAL
GLUCOSE QUALITATIVE U: ABNORMAL
HYALINE CASTS: 0 /LPF
KETONES URINE: NEGATIVE
LEUKOCYTE ESTERASE URINE: NEGATIVE
MICROSCOPIC-UA: NORMAL
NITRITE URINE: NEGATIVE
PH URINE: 6.5
PROTEIN URINE: NORMAL
RED BLOOD CELLS URINE: 1 /HPF
SPECIFIC GRAVITY URINE: 1.01
SQUAMOUS EPITHELIAL CELLS: 0 /HPF
UROBILINOGEN URINE: NORMAL
WHITE BLOOD CELLS URINE: 1 /HPF

## 2022-08-26 NOTE — CONSULT NOTE ADULT - SUBJECTIVE AND OBJECTIVE BOX
Testosterone 200 mg/1mL given in left hip. Patient tolerated well. Patient is a 39y old  Male who presents with a chief complaint of DDRT ( donor renal transplant) (28 Oct 2019 11:09)    Dental was paged to assess patient's bridge that was knocked out during surgery on 2019 and to assess potential damage to the bridge     HPI:  39 year old male with PMHx of DM 2 () on insulin since ,EDRD on HD M/W/F since  (last HD Monday 10/22) Via Left forearm AVF, HTN (),HDL, Retinopathy,  Cataract surgery and laser eye surgery presents today for a  HCV+ DDRT with Simulect induction         EKG: 10/4/2018 NSR   Stress: 11/15/2018- NSR with no significant ST abnormalities   ECHO: 10/18/2018- EF 66%  Mild MR. Normal LV internal dimensions and wall thicknesses Normal LV systolic function. No segmental wall motion abnormalities. Normal RV size and function.   CATH: 16 Proximal LAD minur luminar irregularities with no flow limiting lesions, RCA large sized minor luminal irregularieties with no flow limiting lesions.   CT Abdomen – 10/1/2019 - The abdominal aorta is nonaneurysmal with mild atherosclerotic changes. The celiac axis, superior mesenteric artery, inferior mesenteric artery and renal arteries are patent without stenosis. The iliac arteries show no evidence of stenosis the internal and external iliacs are patent without focal abnormality. The deep venous system including IVC and iliac veins appear to be within normal limits No evidence of vascular stenosis or venous thrombosis. 5 mm right lower lobe nodule new when compared to prior abdominal pelvic     Recipient:  Geovanna Mcgheeencio   /Age:  1980 age 39   Hx: ESRD on HD since , HTN, T2DM, Retinopathy, Anemia,   Nephrologist: ,   CPRA: 0%   ABO: O+   CMV/EBV Status: CMV +, EBV +   Last HD: Monday 10/21/19   NPO   since 10/21/2019 @ 2130   Pt Location on route [facility]   Financially cleared - yes   Recipient OR   - 10/22/2019 at 0700   Surgeon: Dr. Michele Greenberg     Donor (import?): yes -   Donor ID: [id number]   Match:  [id number] then local match [id number]   OPO: [facility]   Age: 41   ABO:  O   KDPI: 44%   COD:  Anoxia/Seizure   X Clamp Time  - 10/21/2019 – 04:53   Medical Hx:  ex- IVDU on methadone, kidney stones, UTI, ureter stent, HTN during pregnancy, smoker, anti - HCV + , HCV ARCADIO + , HBsAb +   Terminal Creatinine – 0.5   CMV +/EBV +   Kidney ETA to [facility]: 10/22/19 at 0600   Kidney Laterality   - Left   X-match – virtual negative, no sensitizing events (22 Oct 2019 05:27)    PAST MEDICAL & SURGICAL HISTORY:  Anemia, unspecified type  DM (diabetes mellitus)  ESRD (end stage renal disease) on dialysis: M W,F  Hypercholesterolemia  HTN (hypertension)  AV fistula: L forearm  H/O eye surgery    MEDICATIONS  (STANDING):  aspirin enteric coated 81 milliGRAM(s) Oral daily  chlorhexidine 2% Cloths 1 Application(s) Topical daily  chlorhexidine 4% Liquid 1 Application(s) Topical <User Schedule>  dextrose 5%. 1000 milliLiter(s) (50 mL/Hr) IV Continuous <Continuous>  dextrose 50% Injectable 12.5 Gram(s) IV Push once  dextrose 50% Injectable 25 Gram(s) IV Push once  dextrose 50% Injectable 25 Gram(s) IV Push once  famotidine    Tablet 20 milliGRAM(s) Oral daily  furosemide    Tablet 60 milliGRAM(s) Oral two times a day  insulin glargine Injectable (LANTUS) 12 Unit(s) SubCutaneous at bedtime  insulin lispro (HumaLOG) corrective regimen sliding scale   SubCutaneous three times a day before meals  insulin lispro (HumaLOG) corrective regimen sliding scale   SubCutaneous at bedtime  insulin lispro Injectable (HumaLOG) 8 Unit(s) SubCutaneous three times a day before meals  mycophenolate mofetil 1000 milliGRAM(s) Oral two times a day  nystatin    Suspension [id number] Unit(s) Swish and Swallow four times a day  predniSONE   Tablet   Oral   predniSONE   Tablet 5 milliGRAM(s) Oral two times a day  senna 2 Tablet(s) Oral at bedtime  tacrolimus ER Tablet (ENVARSUS XR) 2 milliGRAM(s) Oral once  trimethoprim   80 mG/sulfamethoxazole 400 mG 1 Tablet(s) Oral daily  valGANciclovir 450 milliGRAM(s) Oral daily    MEDICATIONS  (PRN):  acetaminophen   Tablet .. 650 milliGRAM(s) Oral every 6 hours PRN Mild Pain (1 - 3), Moderate Pain (4 - 6)  dextrose 40% Gel 15 Gram(s) Oral once PRN Blood Glucose LESS THAN 70 milliGRAM(s)/deciliter  glucagon  Injectable 1 milliGRAM(s) IntraMuscular once PRN Glucose LESS THAN 70 milligrams/deciliter  naloxone Injectable 0.1 milliGRAM(s) IV Push every 3 minutes PRN For ANY of the following changes in patient status:  A. RR LESS THAN 10 breaths per minute, B. Oxygen saturation LESS THAN 90%, C. Sedation score of 6  ondansetron Injectable 4 milliGRAM(s) IV Push every 6 hours PRN Nausea and/or Vomiting  ondansetron Injectable 4 milliGRAM(s) IV Push every 6 hours PRN Nausea  traMADol 25 milliGRAM(s) Oral every 6 hours PRN Severe Pain (7 - 10)    Allergies    No Known Allergies    Intolerances    FAMILY HISTORY:  Family history of diabetes mellitus (DM)    SOCIAL HISTORY: patient presented bedside alone    Last Dental Visit: unknown    Vital Signs Last 24 Hrs  T(C): 36.8 (28 Oct 2019 10:00), Max: 37.1 (27 Oct 2019 21:00)  T(F): 98.3 (28 Oct 2019 10:00), Max: 98.8 (28 Oct 2019 01:00)  HR: 108 (28 Oct 2019 10:00) (95 - 108)  BP: 166/100 (28 Oct 2019 10:00) (141/83 - 171/100)  BP(mean): --  RR: 18 (28 Oct 2019 10:00) (18 - 18)  SpO2: 99% (28 Oct 2019 10:00) (97% - 99%)    EOE:  TMJ ( -  ) clicks                    (  -  ) pops                    ( -   ) crepitus             Mandible FROM             Facial bones and MOM grossly intact             ( -  ) trismus             ( -  ) LAD             ( -  ) swelling             ( -  ) asymmetry             ( -  ) palpation             IOE permanent dentition, multiple missing teeth           hard/soft palate:  ( -  ) palatal torus           tongue/FOM WNL           labial/buccal mucosa WNL           ( -  ) percussion           ( -  ) palpation           ( -  ) swelling           Missing Fixed Partial Denture #7-x-9          #7 fractured to gumline          #9 preparation intact           Fixed partial denture #7-x-9 was bedside in a container, intact, coronal portion of tooth #7 that fractured off was inside #7 portion of Fixed Partial Denture. Restorability of prosthesis is questionable.            Radiographs: no radiographs taken at this time    LABS:                        7.6    6.41  )-----------( 169      ( 28 Oct 2019 06:13 )             22.4     10-28    137  |  96  |  76<H>  ----------------------------<  83  4.4   |  24  |  7.51<H>    Ca    7.7<L>      28 Oct 2019 06:11  Phos  6.5     10-28  Mg     2.2     10-28    TPro  5.8<L>  /  Alb  3.3  /  TBili  0.2  /  DBili  x   /  AST  14  /  ALT  5<L>  /  AlkPhos  99  10-28    WBC Count: 6.41 K/uL [3.80 - 10.50] (10-28 @ 06:13)    Platelet Count - Automated: 169 K/uL [150 - 400] (10-28 @ 06:13)  Platelet Count - Automated: 179 K/uL [150 - 400] (10-27 @ 06:42)    RADIOLOGY & ADDITIONAL STUDIES:    ASSESSMENT: Fractured tooth #7 with avulsed fixed partial denture. Clinically, no acute signs of infection noted and patient is stable to be discharged from dental perspective.     PROCEDURE:  Verbal consent obtained. Limited EOE/IOE exam completed as noted above.     RECOMMENDATIONS:   1) Dental F/U with outpatient dentist for comprehensive dental care.   2) If any difficulty swallowing/breathing, fever occur, page dental.     Dorothy Ken DDS, #[id number]

## 2022-09-07 ENCOUNTER — APPOINTMENT (OUTPATIENT)
Dept: TRANSPLANT | Facility: CLINIC | Age: 42
End: 2022-09-07

## 2022-09-10 ENCOUNTER — NON-APPOINTMENT (OUTPATIENT)
Age: 42
End: 2022-09-10

## 2022-09-10 LAB
ALBUMIN SERPL ELPH-MCNC: 4.1 G/DL
ALP BLD-CCNC: 121 U/L
ALT SERPL-CCNC: 13 U/L
ANION GAP SERPL CALC-SCNC: 11 MMOL/L
APPEARANCE: CLEAR
AST SERPL-CCNC: 18 U/L
BACTERIA: NEGATIVE
BASOPHILS # BLD AUTO: 0.04 K/UL
BASOPHILS NFR BLD AUTO: 0.4 %
BILIRUB SERPL-MCNC: 0.5 MG/DL
BILIRUBIN URINE: NEGATIVE
BKV DNA SPEC QL NAA+PROBE: 600 IU/ML
BLOOD URINE: NEGATIVE
BUN SERPL-MCNC: 22 MG/DL
CALCIUM SERPL-MCNC: 10.1 MG/DL
CALCIUM SERPL-MCNC: 10.1 MG/DL
CHLORIDE SERPL-SCNC: 106 MMOL/L
CMV DNA SPEC QL NAA+PROBE: NOT DETECTED IU/ML
CMVPCR LOG: NOT DETECTED LOG10IU/ML
CO2 SERPL-SCNC: 26 MMOL/L
COLOR: YELLOW
CREAT SERPL-MCNC: 1.07 MG/DL
CREAT SPEC-SCNC: 115 MG/DL
CREAT/PROT UR: 0.6 RATIO
EGFR: 89 ML/MIN/1.73M2
EOSINOPHIL # BLD AUTO: 0.13 K/UL
EOSINOPHIL NFR BLD AUTO: 1.4 %
GLUCOSE QUALITATIVE U: ABNORMAL
GLUCOSE SERPL-MCNC: 216 MG/DL
HCT VFR BLD CALC: 44 %
HGB BLD-MCNC: 14.5 G/DL
HYALINE CASTS: 2 /LPF
IMM GRANULOCYTES NFR BLD AUTO: 0.3 %
KETONES URINE: NEGATIVE
LEUKOCYTE ESTERASE URINE: NEGATIVE
LYMPHOCYTES # BLD AUTO: 1.66 K/UL
LYMPHOCYTES NFR BLD AUTO: 17.4 %
MAGNESIUM SERPL-MCNC: 1.7 MG/DL
MAN DIFF?: NORMAL
MCHC RBC-ENTMCNC: 29.3 PG
MCHC RBC-ENTMCNC: 33 GM/DL
MCV RBC AUTO: 88.9 FL
MICROSCOPIC-UA: NORMAL
MONOCYTES # BLD AUTO: 0.85 K/UL
MONOCYTES NFR BLD AUTO: 8.9 %
NEUTROPHILS # BLD AUTO: 6.83 K/UL
NEUTROPHILS NFR BLD AUTO: 71.6 %
NITRITE URINE: NEGATIVE
PARATHYROID HORMONE INTACT: 138 PG/ML
PH URINE: 6.5
PHOSPHATE SERPL-MCNC: 3.3 MG/DL
PLATELET # BLD AUTO: 214 K/UL
POTASSIUM SERPL-SCNC: 4.7 MMOL/L
PROT SERPL-MCNC: 6.7 G/DL
PROT UR-MCNC: 67 MG/DL
PROTEIN URINE: ABNORMAL
RBC # BLD: 4.95 M/UL
RBC # FLD: 13.9 %
RED BLOOD CELLS URINE: 2 /HPF
SODIUM SERPL-SCNC: 143 MMOL/L
SPECIFIC GRAVITY URINE: 1.02
SQUAMOUS EPITHELIAL CELLS: 0 /HPF
TACROLIMUS SERPL-MCNC: 7.2 NG/ML
UROBILINOGEN URINE: NORMAL
WBC # FLD AUTO: 9.54 K/UL
WHITE BLOOD CELLS URINE: 1 /HPF

## 2022-09-13 RX ORDER — BLOOD SUGAR DIAGNOSTIC
STRIP MISCELLANEOUS 4 TIMES DAILY
Qty: 2 | Refills: 4 | Status: ACTIVE | COMMUNITY
Start: 2019-11-13 | End: 1900-01-01

## 2022-10-06 ENCOUNTER — APPOINTMENT (OUTPATIENT)
Age: 42
End: 2022-10-06

## 2022-11-09 ENCOUNTER — APPOINTMENT (OUTPATIENT)
Dept: TRANSPLANT | Facility: CLINIC | Age: 42
End: 2022-11-09

## 2022-11-11 ENCOUNTER — NON-APPOINTMENT (OUTPATIENT)
Age: 42
End: 2022-11-11

## 2022-11-11 LAB
ALBUMIN SERPL ELPH-MCNC: 4 G/DL
ALP BLD-CCNC: 151 U/L
ALT SERPL-CCNC: 14 U/L
ANION GAP SERPL CALC-SCNC: 9 MMOL/L
APPEARANCE: CLEAR
AST SERPL-CCNC: 17 U/L
BACTERIA: NEGATIVE
BASOPHILS # BLD AUTO: 0.02 K/UL
BASOPHILS NFR BLD AUTO: 0.2 %
BILIRUB SERPL-MCNC: 0.4 MG/DL
BILIRUBIN URINE: NEGATIVE
BLOOD URINE: NEGATIVE
BUN SERPL-MCNC: 16 MG/DL
CALCIUM SERPL-MCNC: 9.7 MG/DL
CALCIUM SERPL-MCNC: 9.7 MG/DL
CHLORIDE SERPL-SCNC: 107 MMOL/L
CMV DNA SPEC QL NAA+PROBE: NOT DETECTED IU/ML
CMVPCR LOG: NOT DETECTED LOG10IU/ML
CO2 SERPL-SCNC: 24 MMOL/L
COLOR: YELLOW
CREAT SERPL-MCNC: 0.92 MG/DL
CREAT SPEC-SCNC: 160 MG/DL
CREAT/PROT UR: 0.8 RATIO
EGFR: 107 ML/MIN/1.73M2
EOSINOPHIL # BLD AUTO: 0.16 K/UL
EOSINOPHIL NFR BLD AUTO: 1.9 %
GLUCOSE QUALITATIVE U: ABNORMAL
GLUCOSE SERPL-MCNC: 179 MG/DL
HCT VFR BLD CALC: 44.9 %
HGB BLD-MCNC: 14.5 G/DL
HYALINE CASTS: 1 /LPF
IMM GRANULOCYTES NFR BLD AUTO: 0.5 %
KETONES URINE: NEGATIVE
LEUKOCYTE ESTERASE URINE: NEGATIVE
LYMPHOCYTES # BLD AUTO: 1.26 K/UL
LYMPHOCYTES NFR BLD AUTO: 14.6 %
MAGNESIUM SERPL-MCNC: 1.7 MG/DL
MAN DIFF?: NORMAL
MCHC RBC-ENTMCNC: 28.7 PG
MCHC RBC-ENTMCNC: 32.3 GM/DL
MCV RBC AUTO: 88.7 FL
MICROSCOPIC-UA: NORMAL
MONOCYTES # BLD AUTO: 0.72 K/UL
MONOCYTES NFR BLD AUTO: 8.3 %
NEUTROPHILS # BLD AUTO: 6.44 K/UL
NEUTROPHILS NFR BLD AUTO: 74.5 %
NITRITE URINE: NEGATIVE
PARATHYROID HORMONE INTACT: 125 PG/ML
PH URINE: 6
PHOSPHATE SERPL-MCNC: 2.6 MG/DL
PLATELET # BLD AUTO: 239 K/UL
POTASSIUM SERPL-SCNC: 4 MMOL/L
PROT SERPL-MCNC: 6.7 G/DL
PROT UR-MCNC: 130 MG/DL
PROTEIN URINE: ABNORMAL
RBC # BLD: 5.06 M/UL
RBC # FLD: 13.8 %
RED BLOOD CELLS URINE: 2 /HPF
SODIUM SERPL-SCNC: 139 MMOL/L
SPECIFIC GRAVITY URINE: 1.02
SQUAMOUS EPITHELIAL CELLS: 1 /HPF
TACROLIMUS SERPL-MCNC: 3.5 NG/ML
UROBILINOGEN URINE: NORMAL
WBC # FLD AUTO: 8.64 K/UL
WHITE BLOOD CELLS URINE: 1 /HPF

## 2022-11-15 ENCOUNTER — NON-APPOINTMENT (OUTPATIENT)
Age: 42
End: 2022-11-15

## 2022-11-15 LAB — BKV DNA SPEC QL NAA+PROBE: 500 IU/ML

## 2022-11-30 ENCOUNTER — APPOINTMENT (OUTPATIENT)
Dept: TRANSPLANT | Facility: CLINIC | Age: 42
End: 2022-11-30

## 2022-12-22 NOTE — PROGRESS NOTE ADULT - SUBJECTIVE AND OBJECTIVE BOX
Transplant Surgery Multidisciplinary Progress Note    HCV + DDRT 10/22/2019    Present: Patient seen with multidisciplinary team including Transplant Surgeon: , DON Bills, and unit nurse during am rounds and examined with Dr. Duffy.  Disciplines not in attendance will be notified of the plan.     HPI: 39M with PMHx of IDDM () on insulin since , ESRD on HD M/W/F since  (last HD Monday 10/22) Via Left forearm AVF, HTN (), HLD, Retinopathy,  Cataract surgery and laser eye surgery s/p HCV+ DDRT with Simulect induction on 10/22. ureter stented.  Post op course c/b DGF requiring HD.     Donor information:   UNOS ID: [id number]  Match:  [id number] then local match [id number]  OPO: [facility] – [facility] Otto  ABO:  O  KDPI: 44%  Cold Ischemia time: 30 hrs  CMV +/EBV +    OR:  donor renal transplant to right iliac vessels  Two arteries, anastomosed separately, single vein, Extramedullary renal pelvis with single ureter  Placement of double J ureteral stent    Interval Events: POD 4 s/p DDRT c/b DGF  - Last HD on 10/24, Creat 8.21 this am (up from 6.14), K 5.5, u/o 765 (improving)   - Afebrile, VSS, pain well controlled, no shortness of breath, lungs clear  - Plan to dc hightower cath today, repeat BMP at noon, will decide re: HD pending repeat BMP    Potential Discharge date: pending clinical improvement/likely Monday     Education:  Medications    Plan of care:  See Below    MEDICATIONS  (STANDING):  aspirin enteric coated 81 milliGRAM(s) Oral daily  chlorhexidine 2% Cloths 1 Application(s) Topical daily  chlorhexidine 4% Liquid 1 Application(s) Topical <User Schedule>  dextrose 5%. 1000 milliLiter(s) (50 mL/Hr) IV Continuous <Continuous>  dextrose 50% Injectable 12.5 Gram(s) IV Push once  dextrose 50% Injectable 25 Gram(s) IV Push once  dextrose 50% Injectable 25 Gram(s) IV Push once  famotidine    Tablet 20 milliGRAM(s) Oral daily  insulin glargine Injectable (LANTUS) 10 Unit(s) SubCutaneous at bedtime  insulin lispro (HumaLOG) corrective regimen sliding scale   SubCutaneous three times a day before meals  insulin lispro (HumaLOG) corrective regimen sliding scale   SubCutaneous at bedtime  insulin lispro Injectable (HumaLOG) 6 Unit(s) SubCutaneous three times a day before meals  mycophenolate mofetil 1000 milliGRAM(s) Oral two times a day  NIFEdipine XL 30 milliGRAM(s) Oral daily  nystatin    Suspension [id number] Unit(s) Swish and Swallow four times a day  predniSONE   Tablet 20 milliGRAM(s) Oral two times a day  tacrolimus ER Tablet (ENVARSUS XR) 8 milliGRAM(s) Oral daily  trimethoprim   80 mG/sulfamethoxazole 400 mG 1 Tablet(s) Oral daily  valGANciclovir 450 milliGRAM(s) Oral daily    MEDICATIONS  (PRN):  acetaminophen   Tablet .. 650 milliGRAM(s) Oral every 6 hours PRN Mild Pain (1 - 3), Moderate Pain (4 - 6)  dextrose 40% Gel 15 Gram(s) Oral once PRN Blood Glucose LESS THAN 70 milliGRAM(s)/deciliter  glucagon  Injectable 1 milliGRAM(s) IntraMuscular once PRN Glucose LESS THAN 70 milligrams/deciliter  naloxone Injectable 0.1 milliGRAM(s) IV Push every 3 minutes PRN For ANY of the following changes in patient status:  A. RR LESS THAN 10 breaths per minute, B. Oxygen saturation LESS THAN 90%, C. Sedation score of 6  ondansetron Injectable 4 milliGRAM(s) IV Push every 6 hours PRN Nausea and/or Vomiting  ondansetron Injectable 4 milliGRAM(s) IV Push every 6 hours PRN Nausea  traMADol 25 milliGRAM(s) Oral every 6 hours PRN Severe Pain (7 - 10)      PAST MEDICAL & SURGICAL HISTORY:  Anemia, unspecified type  DM (diabetes mellitus)  ESRD (end stage renal disease) on dialysis: M W,F  Hypercholesterolemia  HTN (hypertension)  AV fistula: L forearm  H/O eye surgery    Vital Signs Last 24 Hrs  T(C): 36.8 (26 Oct 2019 05:00), Max: 36.8 (26 Oct 2019 01:00)  T(F): 98.3 (26 Oct 2019 05:00), Max: 98.3 (26 Oct 2019 05:00)  HR: 93 (26 Oct 2019 05:00) (93 - 102)  BP: 162/97 (26 Oct 2019 05:00) (115/70 - 162/97)  BP(mean): --  RR: 18 (26 Oct 2019 05:00) (18 - 18)  SpO2: 97% (26 Oct 2019 05:00) (96% - 98%)    I&O's Summary    25 Oct 2019 07:  -  26 Oct 2019 07:00  --------------------------------------------------------  IN: 1480 mL / OUT: 785 mL / NET: 695 mL    26 Oct 2019 07:  -  26 Oct 2019 09:32  --------------------------------------------------------  IN: 250 mL / OUT: 90 mL / NET: 160 mL                         7.7    8.67  )-----------( 159      ( 26 Oct 2019 06:17 )             23.5     10-26    137  |  91<L>  |  75<H>  ----------------------------<  226<H>  5.5<H>   |  22  |  8.21<H>    Ca    7.1<L>      26 Oct 2019 06:17  Phos  7.4     10-26  Mg     2.2     10-26    TPro  5.6<L>  /  Alb  3.1<L>  /  TBili  0.2  /  DBili  x   /  AST  17  /  ALT  <5<L>  /  AlkPhos  127<H>  10-26    Tacrolimus (), Serum: <2.0 ng/mL (10-25 @ 07:58)    Review of systems  Gen: No weight changes, fatigue, fevers/chills, weakness  Skin: No rashes  Head/Eyes/Ears/Mouth: No headache; Normal hearing; Normal vision w/o blurriness; No sinus pain/discomfort, sore throat  Respiratory: No dyspnea, cough, wheezing, hemoptysis  CV: No chest pain, PND, orthopnea  GI: C/O mild abdominal pain at surgical site, diarrhea, constipation, nausea, vomiting, melena, hematochezia  : No increased frequency, dysuria, hematuria, nocturia  MSK: No joint pain/swelling; no back pain; no edema  Neuro: No dizziness/lightheadedness, weakness, seizures, numbness, tingling  Heme: No easy bruising or bleeding  Endo: No heat/cold intolerance  Psych: No significant nervousness, anxiety, stress, depression  All other systems were reviewed and are negative, except as noted.      PHYSICAL EXAM:  Constitutional: Well developed / well nourished  Eyes: Anicteric, PERRLA  ENMT: nc/at  Neck: no JVD  Respiratory: CTA B/L  Cardiovascular: RRR  Gastrointestinal:  non distended, soft, incision c/d/i, RONALD x 1 with SS drainage   Genitourinary: Urinary catheter in place  Extremities: SCD's in place and working bilaterally  Vascular: Palpable dp pulses bilaterally  Neurological: A&O x3  Skin: incision c/d/i  Musculoskeletal: Moving all extremities  Psychiatric: Responsive Negative

## 2023-01-04 RX ORDER — GABAPENTIN 100 MG/1
100 CAPSULE ORAL
Qty: 90 | Refills: 0 | Status: ACTIVE | COMMUNITY
Start: 2019-10-30 | End: 1900-01-01

## 2023-01-23 ENCOUNTER — APPOINTMENT (OUTPATIENT)
Dept: NEPHROLOGY | Facility: CLINIC | Age: 43
End: 2023-01-23

## 2023-03-20 ENCOUNTER — APPOINTMENT (OUTPATIENT)
Dept: NEPHROLOGY | Facility: CLINIC | Age: 43
End: 2023-03-20
Payer: MEDICARE

## 2023-03-20 VITALS
TEMPERATURE: 97.8 F | OXYGEN SATURATION: 98 % | RESPIRATION RATE: 16 BRPM | HEIGHT: 62 IN | BODY MASS INDEX: 28.52 KG/M2 | DIASTOLIC BLOOD PRESSURE: 88 MMHG | SYSTOLIC BLOOD PRESSURE: 153 MMHG | HEART RATE: 92 BPM | WEIGHT: 155 LBS

## 2023-03-20 PROCEDURE — 99214 OFFICE O/P EST MOD 30 MIN: CPT

## 2023-03-20 NOTE — HISTORY OF PRESENT ILLNESS
[ Donor] :  donor [Basiliximab] : basiliximab [Positive/Positive] : Donor Positive/Recipient Positive [PHS Increased Risk] : [facility] increased risk [Hepatitis C] : hepatitis c [Brain Death] : brain death [Received on Pump] : organ received on pump [KDPI: ____] : Kidney Donor Profile Index: [unfilled] [Cold Ischemic Time: ____] : Cold Ischemic Time: [unfilled] [100: Normal, no complaints, no evidence of disease.] : 100: Normal, no complaints, no evidence of disease. [ABO Incompatible] : ABO compatible [TextBox_34] : DGF requiring HD for hyperkalemia [de-identified] : 40 male, hx DM1, HTN on dialysis since 2013.  did well post transplant but had DGF.  \par \par 2/2020 pt developed urinary retention and required hightower catheter placement.  Saw urology and had catheter removed March 13th.  \par \par Interval events: \par Pt feels well.  Blood pressure well controlled at home.  Did not take his medication yet.  Has occasional mild LE edema.  Blood sugars still high.

## 2023-03-20 NOTE — ASSESSMENT
[FreeTextEntry1] : 1.  Renal transplant - Pt with DGF which has resolved.  Baseline creatinine is 1.  Will repeat labs today. \par 2.  Immunosuppression - simulect induction, cont tac for target 4-6 and pred 5.   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   \par 3.  HTN - controlled at home.  Did not take BP meds yet today. \par 4.  DM1 - Blood glucose improving but A1c still above goal.  \par 5.  Hyperkalmemia - now controlled with diet.      \par 6.  HCV - completed therapy, last pcr ND\par 7.  BK - off of MMF.  Last vl 500, await repeat.  Immuno minimized.  \par 8.  Urinary retention - on flomax.  Now following with urology. \par 9.  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 \par Pt will f/u with Dr. Randolph in 3 months.

## 2023-03-20 NOTE — PHYSICAL EXAM
[General Appearance - Alert] : alert [General Appearance - In No Acute Distress] : in no acute distress [General Appearance - Well Nourished] : well nourished [Sclera] : the sclera and conjunctiva were normal [Extraocular Movements] : extraocular movements were intact [Outer Ear] : the ears and nose were normal in appearance [Hearing Threshold Finger Rub Not [place]] : hearing was normal [Examination Of The Oral Cavity] : the lips and gums were normal [Neck Appearance] : the appearance of the neck was normal [Neck Cervical Mass (___cm)] : no neck mass was observed [Jugular Venous Distention Increased] : there was no jugular-venous distention [Thyroid Diffuse Enlargement] : the thyroid was not enlarged [Respiration, Rhythm And Depth] : normal respiratory rhythm and effort [Auscultation Breath Sounds / Voice Sounds] : lungs were clear to auscultation bilaterally [Chest Palpation] : palpation of the chest revealed no abnormalities [Heart Rate And Rhythm] : heart rate was normal and rhythm regular [Heart Sounds] : normal S1 and S2 [Heart Sounds Gallop] : no gallops [Bowel Sounds] : normal bowel sounds [Abdomen Soft] : soft [Abdomen Tenderness] : non-tender [Cervical Lymph Nodes Enlarged Posterior Bilaterally] : posterior cervical [Supraclavicular Lymph Nodes Enlarged Bilaterally] : supraclavicular [Cervical Lymph Nodes Enlarged Anterior Bilaterally] : anterior cervical [No CVA Tenderness] : no ~M costovertebral angle tenderness [Abnormal Walk] : normal gait [Nail Clubbing] : no clubbing  or cyanosis of the fingernails [Musculoskeletal - Swelling] : no joint swelling seen [Skin Color & Pigmentation] : normal skin color and pigmentation [Skin Turgor] : normal skin turgor [] : no rash [Cranial Nerves] : cranial nerves 2-12 were intact [No Focal Deficits] : no focal deficits [Oriented To Time, Place, And Person] : oriented to person, place, and time [Impaired Insight] : insight and judgment were intact [Affect] : the affect was normal [Mood] : the mood was normal [FreeTextEntry1] : trace b/l LE edema.

## 2023-03-21 ENCOUNTER — NON-APPOINTMENT (OUTPATIENT)
Age: 43
End: 2023-03-21

## 2023-03-21 LAB
ALBUMIN SERPL ELPH-MCNC: 4.3 G/DL
ALP BLD-CCNC: 167 U/L
ALT SERPL-CCNC: 16 U/L
ANION GAP SERPL CALC-SCNC: 14 MMOL/L
APPEARANCE: CLEAR
AST SERPL-CCNC: 19 U/L
BACTERIA: NEGATIVE
BASOPHILS # BLD AUTO: 0.06 K/UL
BASOPHILS NFR BLD AUTO: 0.6 %
BILIRUB SERPL-MCNC: 0.4 MG/DL
BILIRUBIN URINE: NEGATIVE
BLOOD URINE: NEGATIVE
BUN SERPL-MCNC: 18 MG/DL
CALCIUM SERPL-MCNC: 10.4 MG/DL
CALCIUM SERPL-MCNC: 10.4 MG/DL
CHLORIDE SERPL-SCNC: 102 MMOL/L
CMV DNA SPEC QL NAA+PROBE: NOT DETECTED IU/ML
CMVPCR LOG: NOT DETECTED LOG10IU/ML
CO2 SERPL-SCNC: 24 MMOL/L
COLOR: YELLOW
CREAT SERPL-MCNC: 0.92 MG/DL
CREAT SPEC-SCNC: 98 MG/DL
CREAT/PROT UR: 0.8 RATIO
EGFR: 107 ML/MIN/1.73M2
EOSINOPHIL # BLD AUTO: 0.13 K/UL
EOSINOPHIL NFR BLD AUTO: 1.3 %
GLUCOSE QUALITATIVE U: ABNORMAL
GLUCOSE SERPL-MCNC: 197 MG/DL
HCT VFR BLD CALC: 46.4 %
HGB BLD-MCNC: 15.2 G/DL
HYALINE CASTS: 0 /LPF
IMM GRANULOCYTES NFR BLD AUTO: 0.4 %
KETONES URINE: NEGATIVE
LEUKOCYTE ESTERASE URINE: NEGATIVE
LYMPHOCYTES # BLD AUTO: 1.84 K/UL
LYMPHOCYTES NFR BLD AUTO: 18.6 %
MAN DIFF?: NORMAL
MCHC RBC-ENTMCNC: 29.2 PG
MCHC RBC-ENTMCNC: 32.8 GM/DL
MCV RBC AUTO: 89.2 FL
MICROSCOPIC-UA: NORMAL
MONOCYTES # BLD AUTO: 0.88 K/UL
MONOCYTES NFR BLD AUTO: 8.9 %
NEUTROPHILS # BLD AUTO: 6.93 K/UL
NEUTROPHILS NFR BLD AUTO: 70.2 %
NITRITE URINE: NEGATIVE
PARATHYROID HORMONE INTACT: 127 PG/ML
PH URINE: 6
PLATELET # BLD AUTO: 271 K/UL
POTASSIUM SERPL-SCNC: 4.5 MMOL/L
PROT SERPL-MCNC: 7.1 G/DL
PROT UR-MCNC: 82 MG/DL
PROTEIN URINE: ABNORMAL
RBC # BLD: 5.2 M/UL
RBC # FLD: 14 %
RED BLOOD CELLS URINE: 0 /HPF
SODIUM SERPL-SCNC: 140 MMOL/L
SPECIFIC GRAVITY URINE: 1.02
SQUAMOUS EPITHELIAL CELLS: 0 /HPF
TACROLIMUS SERPL-MCNC: 4.8 NG/ML
URINE COMMENTS: NORMAL
UROBILINOGEN URINE: NORMAL
WBC # FLD AUTO: 9.88 K/UL
WHITE BLOOD CELLS URINE: 1 /HPF

## 2023-03-24 ENCOUNTER — NON-APPOINTMENT (OUTPATIENT)
Age: 43
End: 2023-03-24

## 2023-03-24 LAB — BKV DNA SPEC QL NAA+PROBE: ABNORMAL IU/ML

## 2023-03-29 RX ORDER — INSULIN ASPART 100 [IU]/ML
100 INJECTION, SOLUTION INTRAVENOUS; SUBCUTANEOUS
Qty: 30 | Refills: 0 | Status: ACTIVE | COMMUNITY
Start: 2019-10-24 | End: 1900-01-01

## 2023-04-03 ENCOUNTER — RX RENEWAL (OUTPATIENT)
Age: 43
End: 2023-04-03

## 2023-07-24 RX ORDER — TAMSULOSIN HYDROCHLORIDE 0.4 MG/1
0.4 CAPSULE ORAL
Qty: 30 | Refills: 3 | Status: ACTIVE | COMMUNITY
Start: 2020-04-06 | End: 1900-01-01

## 2023-07-31 ENCOUNTER — APPOINTMENT (OUTPATIENT)
Dept: NEPHROLOGY | Facility: CLINIC | Age: 43
End: 2023-07-31
Payer: MEDICARE

## 2023-07-31 VITALS
BODY MASS INDEX: 28.34 KG/M2 | HEART RATE: 82 BPM | RESPIRATION RATE: 16 BRPM | TEMPERATURE: 98 F | DIASTOLIC BLOOD PRESSURE: 79 MMHG | HEIGHT: 62 IN | WEIGHT: 154 LBS | SYSTOLIC BLOOD PRESSURE: 136 MMHG | OXYGEN SATURATION: 99 %

## 2023-07-31 DIAGNOSIS — E11.9 TYPE 2 DIABETES MELLITUS W/OUT COMPLICATIONS: ICD-10-CM

## 2023-07-31 PROCEDURE — 99214 OFFICE O/P EST MOD 30 MIN: CPT

## 2023-08-01 NOTE — HISTORY OF PRESENT ILLNESS
[ Donor] :  donor [Basiliximab] : basiliximab [Positive/Positive] : Donor Positive/Recipient Positive [PHS Increased Risk] : [facility] Service increased risk [Hepatitis C] : hepatitis c [Brain Death] : brain death [Received on Pump] : organ received on pump [KDPI: ____] : Kidney Donor Profile Index: [unfilled] [Cold Ischemic Time: ____] : Cold Ischemic Time: [unfilled] [100: Normal, no complaints, no evidence of disease.] : 100: Normal, no complaints, no evidence of disease. [ABO Incompatible] : ABO compatible [TextBox_34] : DGF requiring HD for hyperkalemia [FreeTextEntry1] : 40 male, hx DM1, HTN on dialysis since 2013.  did well post transplant but had DGF.    2/2020 pt developed urinary retention and required hightower catheter placement.  Saw urology and had catheter removed March 13th.  [de-identified] :  Pt feels well .   ??  Blood pressure well controlled at home.  Did not take his medication yet.   Blood sugars still high but improving.

## 2023-08-01 NOTE — PHYSICAL EXAM
[General Appearance - Alert] : alert [General Appearance - In No Acute Distress] : in no acute distress [General Appearance - Well Nourished] : well nourished [Sclera] : the sclera and conjunctiva were normal [Extraocular Movements] : extraocular movements were intact [Outer Ear] : the ears and nose were normal in appearance [Hearing Threshold Finger Rub Not [place]] : hearing was normal [Examination Of The Oral Cavity] : the lips and gums were normal [Neck Appearance] : the appearance of the neck was normal [Neck Cervical Mass (___cm)] : no neck mass was observed [Jugular Venous Distention Increased] : there was no jugular-venous distention [Thyroid Diffuse Enlargement] : the thyroid was not enlarged [Respiration, Rhythm And Depth] : normal respiratory rhythm and effort [Auscultation Breath Sounds / Voice Sounds] : lungs were clear to auscultation bilaterally [Chest Palpation] : palpation of the chest revealed no abnormalities [Heart Rate And Rhythm] : heart rate was normal and rhythm regular [Heart Sounds] : normal S1 and S2 [Heart Sounds Gallop] : no gallops [Bowel Sounds] : normal bowel sounds [Abdomen Soft] : soft [Abdomen Tenderness] : non-tender [Cervical Lymph Nodes Enlarged Posterior Bilaterally] : posterior cervical [Cervical Lymph Nodes Enlarged Anterior Bilaterally] : anterior cervical [Supraclavicular Lymph Nodes Enlarged Bilaterally] : supraclavicular [No CVA Tenderness] : no ~M costovertebral angle tenderness [Abnormal Walk] : normal gait [Nail Clubbing] : no clubbing  or cyanosis of the fingernails [Musculoskeletal - Swelling] : no joint swelling seen [Skin Color & Pigmentation] : normal skin color and pigmentation [Skin Turgor] : normal skin turgor [] : no rash [Cranial Nerves] : cranial nerves 2-12 were intact [No Focal Deficits] : no focal deficits [Oriented To Time, Place, And Person] : oriented to person, place, and time [Impaired Insight] : insight and judgment were intact [Affect] : the affect was normal [Mood] : the mood was normal [FreeTextEntry1] : trace b/l LE edema.

## 2023-08-01 NOTE — ASSESSMENT
[FreeTextEntry1] : 1.  Renal transplant - Pt with DGF which has resolved.  Baseline creatinine is 1.  Will repeat labs today.  2.  Immunosuppression - simulect induction, cont tac for target 4-6 and pred 5.   3.  HTN - controlled at home.  Did not take BP meds yet today.  4.  DM1 - Blood glucose improving but A1c still above goal.   5.  Hyperkalmemia - now controlled with diet.       6.  HCV - completed therapy, last pcr ND 7.  BK - off of MMF.  Last vl  1000, await repeat.  Immuno minimized.   8.  Urinary retention - on flomax.  Now following with urology.  9.  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   Labs in 3 months and f/u in 6 months.  Pt will f/u with Dr. Randolph in 3 months.

## 2023-08-02 LAB
ALBUMIN SERPL ELPH-MCNC: 4.4 G/DL
ALP BLD-CCNC: 151 U/L
ALT SERPL-CCNC: 16 U/L
ANION GAP SERPL CALC-SCNC: 11 MMOL/L
APPEARANCE: CLEAR
AST SERPL-CCNC: 19 U/L
BACTERIA: NEGATIVE /HPF
BILIRUB SERPL-MCNC: 0.4 MG/DL
BILIRUBIN URINE: NEGATIVE
BKV DNA SPEC QL NAA+PROBE: 800 IU/ML
BLOOD URINE: NEGATIVE
BUN SERPL-MCNC: 24 MG/DL
CALCIUM SERPL-MCNC: 10.2 MG/DL
CAST: 0 /LPF
CHLORIDE SERPL-SCNC: 106 MMOL/L
CMV DNA SPEC QL NAA+PROBE: NOT DETECTED IU/ML
CMVPCR LOG: NOT DETECTED LOG10IU/ML
CO2 SERPL-SCNC: 24 MMOL/L
COLOR: YELLOW
CREAT SERPL-MCNC: 0.98 MG/DL
CREAT SPEC-SCNC: 118 MG/DL
CREAT/PROT UR: 0.6 RATIO
EGFR: 98 ML/MIN/1.73M2
EPITHELIAL CELLS: 0 /HPF
GLUCOSE QUALITATIVE U: >=1000 MG/DL
GLUCOSE SERPL-MCNC: 171 MG/DL
KETONES URINE: NEGATIVE MG/DL
LEUKOCYTE ESTERASE URINE: NEGATIVE
MAGNESIUM SERPL-MCNC: 1.9 MG/DL
MICROSCOPIC-UA: NORMAL
NITRITE URINE: NEGATIVE
PH URINE: 6
PHOSPHATE SERPL-MCNC: 2.5 MG/DL
POTASSIUM SERPL-SCNC: 4.1 MMOL/L
PROT SERPL-MCNC: 7 G/DL
PROT UR-MCNC: 75 MG/DL
PROTEIN URINE: 100 MG/DL
RED BLOOD CELLS URINE: 2 /HPF
SODIUM SERPL-SCNC: 141 MMOL/L
SPECIFIC GRAVITY URINE: 1.03
TACROLIMUS SERPL-MCNC: 6.3 NG/ML
URATE SERPL-MCNC: 5.4 MG/DL
UROBILINOGEN URINE: 1 MG/DL
WHITE BLOOD CELLS URINE: 1 /HPF

## 2023-11-20 RX ORDER — TACROLIMUS 4 MG/1
4 TABLET, EXTENDED RELEASE ORAL
Qty: 30 | Refills: 11 | Status: ACTIVE | COMMUNITY
Start: 2020-05-21 | End: 1900-01-01

## 2023-11-20 RX ORDER — TACROLIMUS 1 MG/1
1 TABLET, EXTENDED RELEASE ORAL
Qty: 60 | Refills: 11 | Status: ACTIVE | COMMUNITY
Start: 2020-05-21 | End: 1900-01-01

## 2023-11-20 RX ORDER — ROSUVASTATIN CALCIUM 5 MG/1
5 TABLET, FILM COATED ORAL
Qty: 90 | Refills: 3 | Status: ACTIVE | COMMUNITY
Start: 2020-10-06 | End: 1900-01-01

## 2024-01-29 ENCOUNTER — NON-APPOINTMENT (OUTPATIENT)
Age: 44
End: 2024-01-29

## 2024-01-29 ENCOUNTER — LABORATORY RESULT (OUTPATIENT)
Age: 44
End: 2024-01-29

## 2024-01-29 ENCOUNTER — APPOINTMENT (OUTPATIENT)
Dept: NEPHROLOGY | Facility: CLINIC | Age: 44
End: 2024-01-29
Payer: MEDICARE

## 2024-01-29 VITALS
OXYGEN SATURATION: 98 % | WEIGHT: 153 LBS | HEART RATE: 89 BPM | SYSTOLIC BLOOD PRESSURE: 133 MMHG | TEMPERATURE: 97.6 F | HEIGHT: 62 IN | BODY MASS INDEX: 28.16 KG/M2 | DIASTOLIC BLOOD PRESSURE: 87 MMHG

## 2024-01-29 DIAGNOSIS — Z79.899 OTHER LONG TERM (CURRENT) DRUG THERAPY: ICD-10-CM

## 2024-01-29 DIAGNOSIS — I10 ESSENTIAL (PRIMARY) HYPERTENSION: ICD-10-CM

## 2024-01-29 DIAGNOSIS — Z94.0 KIDNEY TRANSPLANT STATUS: ICD-10-CM

## 2024-01-29 LAB
ALBUMIN SERPL ELPH-MCNC: 4.5 G/DL
ALP BLD-CCNC: 190 U/L
ALT SERPL-CCNC: 17 U/L
ANION GAP SERPL CALC-SCNC: 11 MMOL/L
APPEARANCE: CLEAR
AST SERPL-CCNC: 22 U/L
BASOPHILS # BLD AUTO: 0.04 K/UL
BASOPHILS NFR BLD AUTO: 0.4 %
BILIRUB SERPL-MCNC: 0.4 MG/DL
BILIRUBIN URINE: NEGATIVE
BLOOD URINE: NEGATIVE
BUN SERPL-MCNC: 24 MG/DL
CALCIUM SERPL-MCNC: 10.7 MG/DL
CHLORIDE SERPL-SCNC: 101 MMOL/L
CO2 SERPL-SCNC: 25 MMOL/L
COLOR: YELLOW
CREAT SERPL-MCNC: 0.96 MG/DL
EGFR: 101 ML/MIN/1.73M2
EOSINOPHIL # BLD AUTO: 0.17 K/UL
EOSINOPHIL NFR BLD AUTO: 1.7 %
GLUCOSE QUALITATIVE U: >=1000 MG/DL
GLUCOSE SERPL-MCNC: 339 MG/DL
HCT VFR BLD CALC: 48.9 %
HGB BLD-MCNC: 16.2 G/DL
IMM GRANULOCYTES NFR BLD AUTO: 0.5 %
KETONES URINE: NEGATIVE MG/DL
LEUKOCYTE ESTERASE URINE: NEGATIVE
LYMPHOCYTES # BLD AUTO: 1.75 K/UL
LYMPHOCYTES NFR BLD AUTO: 17.1 %
MAGNESIUM SERPL-MCNC: 1.7 MG/DL
MAN DIFF?: NORMAL
MCHC RBC-ENTMCNC: 28.7 PG
MCHC RBC-ENTMCNC: 33.1 GM/DL
MCV RBC AUTO: 86.7 FL
MONOCYTES # BLD AUTO: 0.93 K/UL
MONOCYTES NFR BLD AUTO: 9.1 %
NEUTROPHILS # BLD AUTO: 7.29 K/UL
NEUTROPHILS NFR BLD AUTO: 71.2 %
NITRITE URINE: NEGATIVE
PH URINE: 6.5
PHOSPHATE SERPL-MCNC: 3.2 MG/DL
PLATELET # BLD AUTO: 242 K/UL
POTASSIUM SERPL-SCNC: 5.1 MMOL/L
PROT SERPL-MCNC: 7.5 G/DL
PROTEIN URINE: 100 MG/DL
RBC # BLD: 5.64 M/UL
RBC # FLD: 14.1 %
SODIUM SERPL-SCNC: 138 MMOL/L
SPECIFIC GRAVITY URINE: 1.02
TACROLIMUS SERPL-MCNC: 6.2 NG/ML
UROBILINOGEN URINE: 0.2 MG/DL
WBC # FLD AUTO: 10.23 K/UL

## 2024-01-29 PROCEDURE — 99214 OFFICE O/P EST MOD 30 MIN: CPT

## 2024-01-29 PROCEDURE — G2211 COMPLEX E/M VISIT ADD ON: CPT

## 2024-01-29 NOTE — HISTORY OF PRESENT ILLNESS
[ Donor] :  donor [Basiliximab] : basiliximab [Positive/Positive] : Donor Positive/Recipient Positive [PHS Increased Risk] : [facility] increased risk [Hepatitis C] : hepatitis c [Brain Death] : brain death [Received on Pump] : organ received on pump [KDPI: ____] : Kidney Donor Profile Index: [unfilled] [Cold Ischemic Time: ____] : Cold Ischemic Time: [unfilled] [100: Normal, no complaints, no evidence of disease.] : 100: Normal, no complaints, no evidence of disease. [ABO Incompatible] : ABO compatible [TextBox_34] : DGF requiring HD for hyperkalemia [FreeTextEntry1] : 40 male, hx DM1, HTN on dialysis since 2013.  did well post transplant but had DGF.    2/2020 pt developed urinary retention and required hightower catheter placement.  Saw urology and had catheter removed March 13th.  [de-identified] :  Pt feels well .  Blood pressure well controlled at home.   Blood sugars still high but improving.  He has received vaccinations including influenza, Covid, pneumococcal and shingles through his PMD.

## 2024-01-29 NOTE — ASSESSMENT
[FreeTextEntry1] : 1.  Renal transplant - Pt with DGF which has resolved.  Baseline creatinine is 1.  Will repeat labs today.  2.  Immunosuppression - simulect induction, cont tac for target 4-6 and pred 5.   3.  HTN - controlled at home.  Did not take BP meds yet today.  4.  DM1 - Blood glucose improving but A1c still above goal.   5.  Hyperkalmemia - now controlled with diet.       6.  HCV - completed therapy, last pcr ND 7.  BK - off of MMF.  Last vl  800, will repeat today.  Immuno minimized.   8.  Urinary retention - on flomax.  Now following with urology. No current problems urinating.   9.  Post transplant erythrocytosis - improved Losartan.     Labs in 3 months and f/u in 6 months.  Pt will f/u with Dr. Randolph in 3 months.

## 2024-01-29 NOTE — PHYSICAL EXAM
[General Appearance - Alert] : alert [General Appearance - In No Acute Distress] : in no acute distress [General Appearance - Well Nourished] : well nourished [Sclera] : the sclera and conjunctiva were normal [Extraocular Movements] : extraocular movements were intact [Outer Ear] : the ears and nose were normal in appearance [Hearing Threshold Finger Rub Not [place]] : hearing was normal [Examination Of The Oral Cavity] : the lips and gums were normal [Neck Appearance] : the appearance of the neck was normal [Neck Cervical Mass (___cm)] : no neck mass was observed [Jugular Venous Distention Increased] : there was no jugular-venous distention [Thyroid Diffuse Enlargement] : the thyroid was not enlarged [Respiration, Rhythm And Depth] : normal respiratory rhythm and effort [Auscultation Breath Sounds / Voice Sounds] : lungs were clear to auscultation bilaterally [Chest Palpation] : palpation of the chest revealed no abnormalities [Heart Rate And Rhythm] : heart rate was normal and rhythm regular [Heart Sounds] : normal S1 and S2 [Heart Sounds Gallop] : no gallops [Bowel Sounds] : normal bowel sounds [Abdomen Soft] : soft [Abdomen Tenderness] : non-tender [Cervical Lymph Nodes Enlarged Posterior Bilaterally] : posterior cervical [Cervical Lymph Nodes Enlarged Anterior Bilaterally] : anterior cervical [Supraclavicular Lymph Nodes Enlarged Bilaterally] : supraclavicular [No CVA Tenderness] : no ~M costovertebral angle tenderness [Abnormal Walk] : normal gait [Nail Clubbing] : no clubbing  or cyanosis of the fingernails [Musculoskeletal - Swelling] : no joint swelling seen [Skin Color & Pigmentation] : normal skin color and pigmentation [Skin Turgor] : normal skin turgor [] : no rash [Cranial Nerves] : cranial nerves 2-12 were intact [No Focal Deficits] : no focal deficits [Oriented To Time, Place, And Person] : oriented to person, place, and time [Impaired Insight] : insight and judgment were intact [Affect] : the affect was normal [Mood] : the mood was normal [FreeTextEntry1] : trace b/l LE edema.

## 2024-01-30 LAB
CMV DNA SPEC QL NAA+PROBE: NOT DETECTED IU/ML
CMVPCR LOG: NOT DETECTED LOG10IU/ML

## 2024-02-01 ENCOUNTER — NON-APPOINTMENT (OUTPATIENT)
Age: 44
End: 2024-02-01

## 2024-02-01 LAB — BKV DNA SPEC QL NAA+PROBE: 700 IU/ML

## 2024-06-19 RX ORDER — PREDNISONE 5 MG/1
5 TABLET ORAL
Qty: 90 | Refills: 3 | Status: ACTIVE | COMMUNITY
Start: 2019-10-24 | End: 1900-01-01

## 2024-06-19 RX ORDER — LOSARTAN POTASSIUM 50 MG/1
50 TABLET, FILM COATED ORAL DAILY
Qty: 30 | Refills: 3 | Status: ACTIVE | COMMUNITY
Start: 2020-04-15 | End: 1900-01-01

## 2024-06-19 RX ORDER — AMLODIPINE BESYLATE 10 MG/1
10 TABLET ORAL DAILY
Qty: 30 | Refills: 3 | Status: ACTIVE | COMMUNITY
Start: 2020-01-02 | End: 1900-01-01

## 2025-02-28 NOTE — PROGRESS NOTE ADULT - PROBLEM SELECTOR PLAN 4
Sent pt a message in [employer] re: pt's testosterone was sent to [facility]  Pt needs f/u in AM in 1 month along with labs at 8a  
- hold home med norvasc 5mg daily and hydralazine 100 BID, restart when BP can tolerate starting w/ norvasc  - monitor BP (goal -150)
- start nifedipine XL 30mg daily, titrate up as needed  - hold home med norvasc 5mg daily and hydralazine 100 BID  - monitor BP (goal -150)
- If BP remains high can   increase  nifedipine XL to 60 mg    - monitor BP (goal -150)
- c/w nifedipine XL 30mg daily, titrate up as needed  - monitor BP (goal -150)
- hold home med norvasc 5mg daily and hydralazine 100 BID, restart when BP can tolerate starting w/ norvasc  - monitor BP (goal -150)
- start nifedipine XL 30mg daily, titrate up as needed  - hold home med norvasc 5mg daily and hydralazine 100 BID  - monitor BP (goal -150)

## 2025-03-14 NOTE — ASU PREOP CHECKLIST - HAIR REMOVAL
OK to cancel CT scan and move forward with robotic bronchoscopy for biopsy.  The pt should follow up with Dr Nelson 1-2 weeks after that biopsy is done for the results.       hair removal not indicated

## 2025-04-24 ENCOUNTER — APPOINTMENT (OUTPATIENT)
Dept: NEPHROLOGY | Facility: CLINIC | Age: 45
End: 2025-04-24
Payer: MEDICARE

## 2025-04-24 ENCOUNTER — NON-APPOINTMENT (OUTPATIENT)
Age: 45
End: 2025-04-24

## 2025-04-24 ENCOUNTER — LABORATORY RESULT (OUTPATIENT)
Age: 45
End: 2025-04-24

## 2025-04-24 VITALS
WEIGHT: 161 LBS | RESPIRATION RATE: 14 BRPM | BODY MASS INDEX: 29.63 KG/M2 | SYSTOLIC BLOOD PRESSURE: 114 MMHG | DIASTOLIC BLOOD PRESSURE: 74 MMHG | HEIGHT: 62 IN | OXYGEN SATURATION: 98 % | HEART RATE: 94 BPM | TEMPERATURE: 98 F

## 2025-04-24 DIAGNOSIS — Z79.60 LONG TERM (CURRENT) USE OF UNSPECIFIED IMMUNOMODULATORS AND IMMUNOSUPPRESSANTS: ICD-10-CM

## 2025-04-24 DIAGNOSIS — I10 ESSENTIAL (PRIMARY) HYPERTENSION: ICD-10-CM

## 2025-04-24 DIAGNOSIS — Z94.0 KIDNEY TRANSPLANT STATUS: ICD-10-CM

## 2025-04-24 LAB
ALBUMIN SERPL ELPH-MCNC: 4.7 G/DL
ALP BLD-CCNC: 141 U/L
ALT SERPL-CCNC: 35 U/L
ANION GAP SERPL CALC-SCNC: 17 MMOL/L
APPEARANCE: CLEAR
AST SERPL-CCNC: 30 U/L
BASOPHILS # BLD AUTO: 0.05 K/UL
BASOPHILS NFR BLD AUTO: 0.6 %
BILIRUB SERPL-MCNC: 0.5 MG/DL
BILIRUBIN URINE: NEGATIVE
BLOOD URINE: NEGATIVE
BUN SERPL-MCNC: 20 MG/DL
CALCIUM SERPL-MCNC: 10.7 MG/DL
CALCIUM SERPL-MCNC: 10.7 MG/DL
CHLORIDE SERPL-SCNC: 106 MMOL/L
CO2 SERPL-SCNC: 22 MMOL/L
COLOR: YELLOW
CREAT SERPL-MCNC: 1.09 MG/DL
CREAT SPEC-SCNC: 125 MG/DL
CREAT/PROT UR: 0.2 RATIO
EGFRCR SERPLBLD CKD-EPI 2021: 86 ML/MIN/1.73M2
EOSINOPHIL # BLD AUTO: 0.17 K/UL
EOSINOPHIL NFR BLD AUTO: 2 %
GLUCOSE QUALITATIVE U: >=1000 MG/DL
GLUCOSE SERPL-MCNC: 94 MG/DL
HCT VFR BLD CALC: 45.6 %
HGB BLD-MCNC: 15 G/DL
IMM GRANULOCYTES NFR BLD AUTO: 0.5 %
KETONES URINE: NEGATIVE MG/DL
LEUKOCYTE ESTERASE URINE: ABNORMAL
LYMPHOCYTES # BLD AUTO: 1.84 K/UL
LYMPHOCYTES NFR BLD AUTO: 21.5 %
MAGNESIUM SERPL-MCNC: 1.9 MG/DL
MAN DIFF?: NORMAL
MCHC RBC-ENTMCNC: 29.7 PG
MCHC RBC-ENTMCNC: 32.9 G/DL
MCV RBC AUTO: 90.3 FL
MONOCYTES # BLD AUTO: 0.84 K/UL
MONOCYTES NFR BLD AUTO: 9.8 %
NEUTROPHILS # BLD AUTO: 5.61 K/UL
NEUTROPHILS NFR BLD AUTO: 65.6 %
NITRITE URINE: NEGATIVE
PARATHYROID HORMONE INTACT: 126 PG/ML
PH URINE: 6
PHOSPHATE SERPL-MCNC: 3 MG/DL
PLATELET # BLD AUTO: 237 K/UL
POTASSIUM SERPL-SCNC: 4.1 MMOL/L
PROT SERPL-MCNC: 7.6 G/DL
PROT UR-MCNC: 28 MG/DL
PROTEIN URINE: 30 MG/DL
RBC # BLD: 5.05 M/UL
RBC # FLD: 14 %
SODIUM SERPL-SCNC: 144 MMOL/L
SPECIFIC GRAVITY URINE: 1.02
TACROLIMUS SERPL-MCNC: 5.9 NG/ML
UROBILINOGEN URINE: 1 MG/DL
WBC # FLD AUTO: 8.55 K/UL

## 2025-04-24 PROCEDURE — 99214 OFFICE O/P EST MOD 30 MIN: CPT

## 2025-04-25 LAB
CMV DNA SPEC QL NAA+PROBE: NOT DETECTED IU/ML
CMVPCR LOG: NOT DETECTED LOG10IU/ML

## 2025-04-28 ENCOUNTER — NON-APPOINTMENT (OUTPATIENT)
Age: 45
End: 2025-04-28

## 2025-04-28 LAB — BKV DNA SPEC QL NAA+PROBE: 500 IU/ML
